# Patient Record
Sex: MALE | Race: WHITE | NOT HISPANIC OR LATINO | Employment: OTHER | RURAL
[De-identification: names, ages, dates, MRNs, and addresses within clinical notes are randomized per-mention and may not be internally consistent; named-entity substitution may affect disease eponyms.]

---

## 2017-03-13 RX ORDER — NAPROXEN 500 MG/1
500 TABLET ORAL 2 TIMES DAILY WITH MEALS
Qty: 180 TABLET | Refills: 1 | Status: SHIPPED | OUTPATIENT
Start: 2017-03-13 | End: 2017-10-03 | Stop reason: SDUPTHER

## 2017-03-13 NOTE — TELEPHONE ENCOUNTER
LAST OV 11/14/16 RENEWED 6/6/16 #180 1 REFILL    PATIENT HAD EXTRA MEDICATION FROM PREVIOUS PRESCRIPTIONS HE IS NOW NEEDING THE REFILLS

## 2017-03-21 DIAGNOSIS — E78.00 HYPERCHOLESTEREMIA: ICD-10-CM

## 2017-03-21 DIAGNOSIS — I10 ESSENTIAL HYPERTENSION: ICD-10-CM

## 2017-03-21 RX ORDER — FELODIPINE 5 MG/1
TABLET, EXTENDED RELEASE ORAL
Qty: 90 TABLET | Refills: 3 | Status: SHIPPED | OUTPATIENT
Start: 2017-03-21 | End: 2018-04-02 | Stop reason: SDUPTHER

## 2017-03-21 RX ORDER — LOVASTATIN 40 MG/1
TABLET ORAL
Qty: 90 TABLET | Refills: 3 | Status: SHIPPED | OUTPATIENT
Start: 2017-03-21 | End: 2018-04-02 | Stop reason: SDUPTHER

## 2017-03-21 NOTE — TELEPHONE ENCOUNTER
Last OV  11/14/16   DW    Lovastatin  12/23/16  #90  X 0 refills    Felodipine  12/23/16  #90 x 0 refills.

## 2017-03-27 RX ORDER — SILDENAFIL CITRATE 100 MG
TABLET ORAL
Qty: 18 TABLET | Refills: 2 | OUTPATIENT
Start: 2017-03-27

## 2017-05-08 ENCOUNTER — OFFICE VISIT (OUTPATIENT)
Dept: FAMILY MEDICINE CLINIC | Facility: CLINIC | Age: 82
End: 2017-05-08

## 2017-05-08 VITALS
DIASTOLIC BLOOD PRESSURE: 68 MMHG | SYSTOLIC BLOOD PRESSURE: 138 MMHG | HEART RATE: 56 BPM | BODY MASS INDEX: 28.14 KG/M2 | WEIGHT: 190 LBS | TEMPERATURE: 97.4 F | OXYGEN SATURATION: 97 % | HEIGHT: 69 IN

## 2017-05-08 DIAGNOSIS — R73.01 IMPAIRED FASTING GLUCOSE: ICD-10-CM

## 2017-05-08 DIAGNOSIS — M15.9 PRIMARY OSTEOARTHRITIS INVOLVING MULTIPLE JOINTS: ICD-10-CM

## 2017-05-08 DIAGNOSIS — Z00.00 MEDICARE ANNUAL WELLNESS VISIT, INITIAL: ICD-10-CM

## 2017-05-08 DIAGNOSIS — I10 ESSENTIAL HYPERTENSION: Primary | ICD-10-CM

## 2017-05-08 DIAGNOSIS — E78.00 HYPERCHOLESTEROLEMIA: ICD-10-CM

## 2017-05-08 LAB
ALBUMIN SERPL-MCNC: 4.6 G/DL (ref 3.4–4.8)
ALBUMIN/GLOB SERPL: 1.1 G/DL (ref 1.5–2.5)
ALP SERPL-CCNC: 75 U/L (ref 40–129)
ALT SERPL W P-5'-P-CCNC: 15 U/L (ref 10–44)
ANION GAP SERPL CALCULATED.3IONS-SCNC: 6.6 MMOL/L (ref 3.6–11.2)
AST SERPL-CCNC: 27 U/L (ref 10–34)
BASOPHILS # BLD AUTO: 0.04 10*3/MM3 (ref 0–0.3)
BASOPHILS NFR BLD AUTO: 0.4 % (ref 0–2)
BILIRUB SERPL-MCNC: 0.7 MG/DL (ref 0.2–1.8)
BUN BLD-MCNC: 14 MG/DL (ref 7–21)
BUN/CREAT SERPL: 10.7 (ref 7–25)
CALCIUM SPEC-SCNC: 9.9 MG/DL (ref 7.7–10)
CHLORIDE SERPL-SCNC: 102 MMOL/L (ref 99–112)
CHOLEST SERPL-MCNC: 180 MG/DL (ref 0–200)
CO2 SERPL-SCNC: 31.4 MMOL/L (ref 24.3–31.9)
CREAT BLD-MCNC: 1.31 MG/DL (ref 0.43–1.29)
DEPRECATED RDW RBC AUTO: 40.3 FL (ref 37–54)
EOSINOPHIL # BLD AUTO: 0.24 10*3/MM3 (ref 0–0.7)
EOSINOPHIL NFR BLD AUTO: 2.7 % (ref 0–7)
ERYTHROCYTE [DISTWIDTH] IN BLOOD BY AUTOMATED COUNT: 12.4 % (ref 11.5–14.5)
GFR SERPL CREATININE-BSD FRML MDRD: 52 ML/MIN/1.73
GLOBULIN UR ELPH-MCNC: 4.2 GM/DL
GLUCOSE BLD-MCNC: 112 MG/DL (ref 70–110)
HBA1C MFR BLD: 5.7 % (ref 4.5–5.7)
HCT VFR BLD AUTO: 45.2 % (ref 42–52)
HDLC SERPL-MCNC: 52 MG/DL (ref 60–100)
HGB BLD-MCNC: 16 G/DL (ref 14–18)
IMM GRANULOCYTES # BLD: 0.02 10*3/MM3 (ref 0–0.03)
IMM GRANULOCYTES NFR BLD: 0.2 % (ref 0–0.5)
LDLC SERPL CALC-MCNC: 105 MG/DL (ref 0–100)
LDLC/HDLC SERPL: 2.02 {RATIO}
LYMPHOCYTES # BLD AUTO: 2.37 10*3/MM3 (ref 1–3)
LYMPHOCYTES NFR BLD AUTO: 26.5 % (ref 16–46)
MCH RBC QN AUTO: 31.9 PG (ref 27–33)
MCHC RBC AUTO-ENTMCNC: 35.4 G/DL (ref 33–37)
MCV RBC AUTO: 90 FL (ref 80–94)
MONOCYTES # BLD AUTO: 0.9 10*3/MM3 (ref 0.1–0.9)
MONOCYTES NFR BLD AUTO: 10.1 % (ref 0–12)
NEUTROPHILS # BLD AUTO: 5.37 10*3/MM3 (ref 1.4–6.5)
NEUTROPHILS NFR BLD AUTO: 60.1 % (ref 40–75)
NRBC BLD MANUAL-RTO: 0 /100 WBC (ref 0–0)
OSMOLALITY SERPL CALC.SUM OF ELEC: 280.6 MOSM/KG (ref 273–305)
PLATELET # BLD AUTO: 288 10*3/MM3 (ref 130–400)
PMV BLD AUTO: 11.9 FL (ref 6–10)
POTASSIUM BLD-SCNC: 4.2 MMOL/L (ref 3.5–5.3)
PROT SERPL-MCNC: 8.8 G/DL (ref 6–8)
RBC # BLD AUTO: 5.02 10*6/MM3 (ref 4.7–6.1)
SODIUM BLD-SCNC: 140 MMOL/L (ref 135–153)
TRIGL SERPL-MCNC: 116 MG/DL (ref 0–150)
VLDLC SERPL-MCNC: 23.2 MG/DL
WBC NRBC COR # BLD: 8.94 10*3/MM3 (ref 4.5–12.5)

## 2017-05-08 PROCEDURE — 80061 LIPID PANEL: CPT | Performed by: FAMILY MEDICINE

## 2017-05-08 PROCEDURE — 36415 COLL VENOUS BLD VENIPUNCTURE: CPT | Performed by: FAMILY MEDICINE

## 2017-05-08 PROCEDURE — 99213 OFFICE O/P EST LOW 20 MIN: CPT | Performed by: FAMILY MEDICINE

## 2017-05-08 PROCEDURE — 90732 PPSV23 VACC 2 YRS+ SUBQ/IM: CPT | Performed by: FAMILY MEDICINE

## 2017-05-08 PROCEDURE — 83036 HEMOGLOBIN GLYCOSYLATED A1C: CPT | Performed by: FAMILY MEDICINE

## 2017-05-08 PROCEDURE — 85025 COMPLETE CBC W/AUTO DIFF WBC: CPT | Performed by: FAMILY MEDICINE

## 2017-05-08 PROCEDURE — 80053 COMPREHEN METABOLIC PANEL: CPT | Performed by: FAMILY MEDICINE

## 2017-05-08 PROCEDURE — G0009 ADMIN PNEUMOCOCCAL VACCINE: HCPCS | Performed by: FAMILY MEDICINE

## 2017-05-08 PROCEDURE — G0439 PPPS, SUBSEQ VISIT: HCPCS | Performed by: FAMILY MEDICINE

## 2017-05-16 DIAGNOSIS — I10 BENIGN ESSENTIAL HYPERTENSION: ICD-10-CM

## 2017-05-16 RX ORDER — TRIAMTERENE AND HYDROCHLOROTHIAZIDE 37.5; 25 MG/1; MG/1
CAPSULE ORAL
Qty: 90 CAPSULE | Refills: 3 | Status: SHIPPED | OUTPATIENT
Start: 2017-05-16 | End: 2018-05-21 | Stop reason: SDUPTHER

## 2017-09-11 RX ORDER — NAPROXEN 500 MG/1
TABLET ORAL
Qty: 180 TABLET | Refills: 1 | OUTPATIENT
Start: 2017-09-11

## 2017-10-03 RX ORDER — NAPROXEN 500 MG/1
500 TABLET ORAL 2 TIMES DAILY WITH MEALS
Qty: 180 TABLET | Refills: 3 | Status: SHIPPED | OUTPATIENT
Start: 2017-10-03 | End: 2018-05-21

## 2017-10-11 ENCOUNTER — FLU SHOT (OUTPATIENT)
Dept: FAMILY MEDICINE CLINIC | Facility: CLINIC | Age: 82
End: 2017-10-11

## 2017-10-11 PROCEDURE — 90662 IIV NO PRSV INCREASED AG IM: CPT | Performed by: FAMILY MEDICINE

## 2017-10-11 PROCEDURE — G0008 ADMIN INFLUENZA VIRUS VAC: HCPCS | Performed by: FAMILY MEDICINE

## 2017-11-08 ENCOUNTER — OFFICE VISIT (OUTPATIENT)
Dept: FAMILY MEDICINE CLINIC | Facility: CLINIC | Age: 82
End: 2017-11-08

## 2017-11-08 VITALS
WEIGHT: 184.3 LBS | HEART RATE: 62 BPM | SYSTOLIC BLOOD PRESSURE: 134 MMHG | TEMPERATURE: 97.7 F | HEIGHT: 69 IN | BODY MASS INDEX: 27.3 KG/M2 | DIASTOLIC BLOOD PRESSURE: 60 MMHG

## 2017-11-08 DIAGNOSIS — E78.00 HYPERCHOLESTEROLEMIA: Primary | ICD-10-CM

## 2017-11-08 DIAGNOSIS — I10 ESSENTIAL HYPERTENSION: ICD-10-CM

## 2017-11-08 DIAGNOSIS — M65.9 TENOSYNOVITIS OF THUMB: ICD-10-CM

## 2017-11-08 DIAGNOSIS — M15.9 PRIMARY OSTEOARTHRITIS INVOLVING MULTIPLE JOINTS: ICD-10-CM

## 2017-11-08 LAB
ALBUMIN SERPL-MCNC: 4.9 G/DL (ref 3.4–4.8)
ALBUMIN/GLOB SERPL: 1.2 G/DL (ref 1.5–2.5)
ALP SERPL-CCNC: 80 U/L (ref 40–129)
ALT SERPL W P-5'-P-CCNC: 20 U/L (ref 10–44)
ANION GAP SERPL CALCULATED.3IONS-SCNC: 10.7 MMOL/L (ref 3.6–11.2)
AST SERPL-CCNC: 24 U/L (ref 10–34)
BASOPHILS # BLD AUTO: 0.03 10*3/MM3 (ref 0–0.3)
BASOPHILS NFR BLD AUTO: 0.3 % (ref 0–2)
BILIRUB SERPL-MCNC: 0.7 MG/DL (ref 0.2–1.8)
BUN BLD-MCNC: 17 MG/DL (ref 7–21)
BUN/CREAT SERPL: 12.8 (ref 7–25)
CALCIUM SPEC-SCNC: 10.2 MG/DL (ref 7.7–10)
CHLORIDE SERPL-SCNC: 101 MMOL/L (ref 99–112)
CHOLEST SERPL-MCNC: 189 MG/DL (ref 0–200)
CO2 SERPL-SCNC: 29.3 MMOL/L (ref 24.3–31.9)
CREAT BLD-MCNC: 1.33 MG/DL (ref 0.43–1.29)
DEPRECATED RDW RBC AUTO: 39.8 FL (ref 37–54)
EOSINOPHIL # BLD AUTO: 0.18 10*3/MM3 (ref 0–0.7)
EOSINOPHIL NFR BLD AUTO: 1.8 % (ref 0–7)
ERYTHROCYTE [DISTWIDTH] IN BLOOD BY AUTOMATED COUNT: 12.4 % (ref 11.5–14.5)
GFR SERPL CREATININE-BSD FRML MDRD: 51 ML/MIN/1.73
GLOBULIN UR ELPH-MCNC: 4.1 GM/DL
GLUCOSE BLD-MCNC: 118 MG/DL (ref 70–110)
HCT VFR BLD AUTO: 45.5 % (ref 42–52)
HDLC SERPL-MCNC: 50 MG/DL (ref 60–100)
HGB BLD-MCNC: 16 G/DL (ref 14–18)
IMM GRANULOCYTES # BLD: 0.03 10*3/MM3 (ref 0–0.03)
IMM GRANULOCYTES NFR BLD: 0.3 % (ref 0–0.5)
LDLC SERPL CALC-MCNC: 108 MG/DL (ref 0–100)
LDLC/HDLC SERPL: 2.15 {RATIO}
LYMPHOCYTES # BLD AUTO: 2.09 10*3/MM3 (ref 1–3)
LYMPHOCYTES NFR BLD AUTO: 20.7 % (ref 16–46)
MCH RBC QN AUTO: 31.4 PG (ref 27–33)
MCHC RBC AUTO-ENTMCNC: 35.2 G/DL (ref 33–37)
MCV RBC AUTO: 89.4 FL (ref 80–94)
MONOCYTES # BLD AUTO: 0.62 10*3/MM3 (ref 0.1–0.9)
MONOCYTES NFR BLD AUTO: 6.2 % (ref 0–12)
NEUTROPHILS # BLD AUTO: 7.13 10*3/MM3 (ref 1.4–6.5)
NEUTROPHILS NFR BLD AUTO: 70.7 % (ref 40–75)
OSMOLALITY SERPL CALC.SUM OF ELEC: 283.9 MOSM/KG (ref 273–305)
PLATELET # BLD AUTO: 253 10*3/MM3 (ref 130–400)
PMV BLD AUTO: 11.3 FL (ref 6–10)
POTASSIUM BLD-SCNC: 3.8 MMOL/L (ref 3.5–5.3)
PROT SERPL-MCNC: 9 G/DL (ref 6–8)
RBC # BLD AUTO: 5.09 10*6/MM3 (ref 4.7–6.1)
SODIUM BLD-SCNC: 141 MMOL/L (ref 135–153)
TRIGL SERPL-MCNC: 157 MG/DL (ref 0–150)
TSH SERPL DL<=0.05 MIU/L-ACNC: 3.77 MIU/ML (ref 0.55–4.78)
VLDLC SERPL-MCNC: 31.4 MG/DL
WBC NRBC COR # BLD: 10.08 10*3/MM3 (ref 4.5–12.5)

## 2017-11-08 PROCEDURE — 84443 ASSAY THYROID STIM HORMONE: CPT | Performed by: FAMILY MEDICINE

## 2017-11-08 PROCEDURE — 36415 COLL VENOUS BLD VENIPUNCTURE: CPT | Performed by: FAMILY MEDICINE

## 2017-11-08 PROCEDURE — 80061 LIPID PANEL: CPT | Performed by: FAMILY MEDICINE

## 2017-11-08 PROCEDURE — 85025 COMPLETE CBC W/AUTO DIFF WBC: CPT | Performed by: FAMILY MEDICINE

## 2017-11-08 PROCEDURE — 80053 COMPREHEN METABOLIC PANEL: CPT | Performed by: FAMILY MEDICINE

## 2017-11-08 PROCEDURE — 99213 OFFICE O/P EST LOW 20 MIN: CPT | Performed by: FAMILY MEDICINE

## 2017-11-08 NOTE — PROGRESS NOTES
"Subjective   Clinton Ramirez is a 82 y.o. male.     History of Present Illness follow-up on hypertension dyslipidemia chronic medical problems.  Said couple months difficulty mechanical nontraumatic right wrist discomfort at base of thumb.  Stays very physically active.  Denies recent illness dizziness headaches.  Denies SOB palpitations CP GI  changes.  Sleep patterns are stable.  Energy is stable.  Her and on desired PME.    The following portions of the patient's history were reviewed and updated as appropriate: allergies, current medications, past family history, past social history, past surgical history and problem list.    Review of Systems see the history of present illness    Objective   Physical Exam   Constitutional: He is oriented to person, place, and time. He appears well-developed and well-nourished.   HENT:   Head: Normocephalic.   Mouth/Throat: Oropharynx is clear and moist.   Eyes: Conjunctivae and EOM are normal. Pupils are equal, round, and reactive to light.   Neck: Normal range of motion. Neck supple. No thyromegaly present.   Cardiovascular: Normal rate, regular rhythm, normal heart sounds and intact distal pulses.    No murmur heard.  Pulmonary/Chest: Effort normal and breath sounds normal.   Abdominal: Soft. Bowel sounds are normal. There is no tenderness.   Musculoskeletal: Normal range of motion. He exhibits no edema.   Mildly tender base of right thumb.  Good .  Good range of motion wrist.  No crepitus felt.   Neurological: He is alert and oriented to person, place, and time.   Skin: Skin is warm and dry.   Psychiatric: He has a normal mood and affect.   Vitals reviewed.    /60 (BP Location: Right arm, Patient Position: Sitting, Cuff Size: Adult)  Pulse 62  Temp 97.7 °F (36.5 °C) (Oral)   Ht 68.5\" (174 cm)  Wt 184 lb 4.8 oz (83.6 kg)  BMI 27.62 kg/m2  Assessment/Plan   Clinton was seen today for follow-up.    Diagnoses and all orders for this visit:    Hypercholesterolemia  - "     CBC & Differential  -     Comprehensive Metabolic Panel  -     Lipid Panel  -     TSH  -     CBC Auto Differential    Essential hypertension  -     CBC & Differential  -     Comprehensive Metabolic Panel  -     Lipid Panel  -     TSH  -     CBC Auto Differential    Primary osteoarthritis involving multiple joints  -     CBC & Differential  -     Comprehensive Metabolic Panel  -     CBC Auto Differential    Tenosynovitis of thumb     Weight down a little.  Blood pressure looks good.  Today will check labs to monitor metabolic status.  Overall you seem to be doing remarkably well.  I encourage you to pursue regular exercise as well as good dietary choices.  Regarding the thumb I think is soft tissue inflammation.  I don't see much benefit in changing current anti-inflammatory and you agree.  At this time encourage you to limit excessive repetitive activities.  Continue all medications as reconciled ordered.  Will notify you of lab results.  Follow-up in a few months routinely.  See us definitely if the thumb does not improve with some decreased activities in an attempt to rest that joint.

## 2018-02-27 ENCOUNTER — OFFICE VISIT (OUTPATIENT)
Dept: FAMILY MEDICINE CLINIC | Facility: CLINIC | Age: 83
End: 2018-02-27

## 2018-02-27 VITALS
HEART RATE: 57 BPM | SYSTOLIC BLOOD PRESSURE: 124 MMHG | DIASTOLIC BLOOD PRESSURE: 64 MMHG | BODY MASS INDEX: 27.3 KG/M2 | HEIGHT: 69 IN | TEMPERATURE: 97.8 F | OXYGEN SATURATION: 98 % | WEIGHT: 184.3 LBS

## 2018-02-27 DIAGNOSIS — D22.30 NEVUS OF FACE: Primary | ICD-10-CM

## 2018-02-27 PROCEDURE — 99213 OFFICE O/P EST LOW 20 MIN: CPT | Performed by: NURSE PRACTITIONER

## 2018-03-17 DIAGNOSIS — I10 ESSENTIAL HYPERTENSION: ICD-10-CM

## 2018-03-17 DIAGNOSIS — E78.00 HYPERCHOLESTEREMIA: ICD-10-CM

## 2018-03-19 RX ORDER — LOVASTATIN 40 MG/1
TABLET ORAL
Qty: 90 TABLET | Refills: 3 | OUTPATIENT
Start: 2018-03-19

## 2018-03-19 RX ORDER — FELODIPINE 5 MG/1
TABLET, EXTENDED RELEASE ORAL
Qty: 90 TABLET | Refills: 3 | OUTPATIENT
Start: 2018-03-19

## 2018-04-02 DIAGNOSIS — I10 ESSENTIAL HYPERTENSION: ICD-10-CM

## 2018-04-02 DIAGNOSIS — E78.00 HYPERCHOLESTEREMIA: ICD-10-CM

## 2018-04-02 RX ORDER — LOVASTATIN 40 MG/1
40 TABLET ORAL
Qty: 90 TABLET | Refills: 3 | Status: SHIPPED | OUTPATIENT
Start: 2018-04-02 | End: 2018-07-11 | Stop reason: HOSPADM

## 2018-04-02 RX ORDER — FELODIPINE 5 MG/1
5 TABLET, EXTENDED RELEASE ORAL DAILY
Qty: 90 TABLET | Refills: 3 | Status: SHIPPED | OUTPATIENT
Start: 2018-04-02 | End: 2019-03-12 | Stop reason: SDUPTHER

## 2018-05-08 ENCOUNTER — OFFICE VISIT (OUTPATIENT)
Dept: FAMILY MEDICINE CLINIC | Facility: CLINIC | Age: 83
End: 2018-05-08

## 2018-05-08 VITALS
SYSTOLIC BLOOD PRESSURE: 114 MMHG | WEIGHT: 184.2 LBS | HEART RATE: 83 BPM | BODY MASS INDEX: 27.28 KG/M2 | HEIGHT: 69 IN | TEMPERATURE: 97.8 F | DIASTOLIC BLOOD PRESSURE: 66 MMHG

## 2018-05-08 DIAGNOSIS — E78.00 HYPERCHOLESTEROLEMIA: ICD-10-CM

## 2018-05-08 DIAGNOSIS — I10 ESSENTIAL HYPERTENSION: Primary | ICD-10-CM

## 2018-05-08 DIAGNOSIS — R01.1 HEART MURMUR, SYSTOLIC: ICD-10-CM

## 2018-05-08 LAB
ALBUMIN SERPL-MCNC: 4.3 G/DL (ref 3.4–4.8)
ALBUMIN/GLOB SERPL: 1.2 G/DL (ref 1.5–2.5)
ALP SERPL-CCNC: 69 U/L (ref 40–129)
ALT SERPL W P-5'-P-CCNC: 17 U/L (ref 10–44)
ANION GAP SERPL CALCULATED.3IONS-SCNC: 7.6 MMOL/L (ref 3.6–11.2)
AST SERPL-CCNC: 22 U/L (ref 10–34)
BASOPHILS # BLD AUTO: 0.02 10*3/MM3 (ref 0–0.3)
BASOPHILS NFR BLD AUTO: 0.2 % (ref 0–2)
BILIRUB SERPL-MCNC: 0.5 MG/DL (ref 0.2–1.8)
BUN BLD-MCNC: 18 MG/DL (ref 7–21)
BUN/CREAT SERPL: 12.4 (ref 7–25)
CALCIUM SPEC-SCNC: 9.4 MG/DL (ref 7.7–10)
CHLORIDE SERPL-SCNC: 103 MMOL/L (ref 99–112)
CHOLEST SERPL-MCNC: 159 MG/DL (ref 0–200)
CO2 SERPL-SCNC: 29.4 MMOL/L (ref 24.3–31.9)
CREAT BLD-MCNC: 1.45 MG/DL (ref 0.43–1.29)
DEPRECATED RDW RBC AUTO: 37.9 FL (ref 37–54)
EOSINOPHIL # BLD AUTO: 0.13 10*3/MM3 (ref 0–0.7)
EOSINOPHIL NFR BLD AUTO: 1.5 % (ref 0–7)
ERYTHROCYTE [DISTWIDTH] IN BLOOD BY AUTOMATED COUNT: 12 % (ref 11.5–14.5)
GFR SERPL CREATININE-BSD FRML MDRD: 46 ML/MIN/1.73
GLOBULIN UR ELPH-MCNC: 3.6 GM/DL
GLUCOSE BLD-MCNC: 155 MG/DL (ref 70–110)
HCT VFR BLD AUTO: 40.4 % (ref 42–52)
HDLC SERPL-MCNC: 41 MG/DL (ref 60–100)
HGB BLD-MCNC: 14.4 G/DL (ref 14–18)
IMM GRANULOCYTES # BLD: 0.02 10*3/MM3 (ref 0–0.03)
IMM GRANULOCYTES NFR BLD: 0.2 % (ref 0–0.5)
LDLC SERPL CALC-MCNC: 89 MG/DL (ref 0–100)
LDLC/HDLC SERPL: 2.18 {RATIO}
LYMPHOCYTES # BLD AUTO: 1.76 10*3/MM3 (ref 1–3)
LYMPHOCYTES NFR BLD AUTO: 20.2 % (ref 16–46)
MCH RBC QN AUTO: 31.8 PG (ref 27–33)
MCHC RBC AUTO-ENTMCNC: 35.6 G/DL (ref 33–37)
MCV RBC AUTO: 89.2 FL (ref 80–94)
MONOCYTES # BLD AUTO: 0.61 10*3/MM3 (ref 0.1–0.9)
MONOCYTES NFR BLD AUTO: 7 % (ref 0–12)
NEUTROPHILS # BLD AUTO: 6.18 10*3/MM3 (ref 1.4–6.5)
NEUTROPHILS NFR BLD AUTO: 70.9 % (ref 40–75)
OSMOLALITY SERPL CALC.SUM OF ELEC: 284.4 MOSM/KG (ref 273–305)
PLATELET # BLD AUTO: 255 10*3/MM3 (ref 130–400)
PMV BLD AUTO: 11.5 FL (ref 6–10)
POTASSIUM BLD-SCNC: 3.7 MMOL/L (ref 3.5–5.3)
PROT SERPL-MCNC: 7.9 G/DL (ref 6–8)
RBC # BLD AUTO: 4.53 10*6/MM3 (ref 4.7–6.1)
SODIUM BLD-SCNC: 140 MMOL/L (ref 135–153)
TRIGL SERPL-MCNC: 144 MG/DL (ref 0–150)
VLDLC SERPL-MCNC: 28.8 MG/DL
WBC NRBC COR # BLD: 8.72 10*3/MM3 (ref 4.5–12.5)

## 2018-05-08 PROCEDURE — 80053 COMPREHEN METABOLIC PANEL: CPT | Performed by: FAMILY MEDICINE

## 2018-05-08 PROCEDURE — 36415 COLL VENOUS BLD VENIPUNCTURE: CPT | Performed by: FAMILY MEDICINE

## 2018-05-08 PROCEDURE — 80061 LIPID PANEL: CPT | Performed by: FAMILY MEDICINE

## 2018-05-08 PROCEDURE — 85025 COMPLETE CBC W/AUTO DIFF WBC: CPT | Performed by: FAMILY MEDICINE

## 2018-05-08 PROCEDURE — 99214 OFFICE O/P EST MOD 30 MIN: CPT | Performed by: FAMILY MEDICINE

## 2018-05-08 NOTE — PROGRESS NOTES
"Subjective     Chief Complaint   Patient presents with   • Hyperlipidemia       Clinton Ramirez is a 83 y.o. male.     History of Present Illness follow-up regarding hypertension dyslipidemia arthritis.  Has had recent dermatological procedures.  Will be following their.  Medications are as reconciled compliant.  Has not been ill.  Generally feels quite well.  Good energy.  Trying to be active and compliant with diet.  Denies CP SOB palpitations GI  new orthopedic concerns.  Current on PME.    The following portions of the patient's history were reviewed and updated as appropriate: allergies, current medications, past medical history, past social history, past surgical history and problem list.    Review of Systems see the history of present illness    Objective   Physical Exam   Constitutional: He is oriented to person, place, and time. He appears well-developed and well-nourished.   HENT:   Head: Normocephalic.   Right Ear: External ear normal.   Left Ear: External ear normal.   Mouth/Throat: Oropharynx is clear and moist.   Eyes: Conjunctivae and EOM are normal. Pupils are equal, round, and reactive to light.   Neck: Normal range of motion. Neck supple. No tracheal deviation present. No thyromegaly present.   Cardiovascular: Normal rate, regular rhythm and intact distal pulses.    Murmur heard.  Systolic murmur at base   Pulmonary/Chest: Effort normal and breath sounds normal.   Abdominal: Soft. Bowel sounds are normal. There is no tenderness.   Musculoskeletal: Normal range of motion. He exhibits no edema.   Lymphadenopathy:     He has no cervical adenopathy.   Neurological: He is alert and oriented to person, place, and time.   Skin: Skin is warm and dry.   Psychiatric: He has a normal mood and affect.   Vitals reviewed.    /66 (BP Location: Left arm, Patient Position: Sitting, Cuff Size: Adult)   Pulse 83   Temp 97.8 °F (36.6 °C) (Oral)   Ht 174 cm (68.5\")   Wt 83.6 kg (184 lb 3.2 oz)   BMI 27.60 " kg/m²   Assessment/Plan   Clinton was seen today for hyperlipidemia.    Diagnoses and all orders for this visit:    Essential hypertension  -     Comprehensive Metabolic Panel  -     CBC & Differential    Hypercholesterolemia  -     Comprehensive Metabolic Panel  -     Lipid Panel  -     CBC & Differential    Heart murmur, systolic  -     Adult Transthoracic Echo Complete W/ Cont if Necessary Per Protocol; Future    Continue aggressive TLC.  Check labs.  Will ask for outpatient echocardiogram.  You report being told as young man of murmur but has not been noted in multiple years.  Follow-up in 6 months and pending.

## 2018-05-09 NOTE — PROGRESS NOTES
Blood testing fairly stable although kidney function not as good.  Try to stop taking the arthritis pill if at all possible.  Would be best just to use plain Tylenol.  Avoid Advil Motrin type products OTC also.

## 2018-05-11 ENCOUNTER — APPOINTMENT (OUTPATIENT)
Dept: CARDIOLOGY | Facility: HOSPITAL | Age: 83
End: 2018-05-11

## 2018-05-11 ENCOUNTER — HOSPITAL ENCOUNTER (OUTPATIENT)
Dept: CARDIOLOGY | Facility: HOSPITAL | Age: 83
Discharge: HOME | End: 2018-05-11
Admitting: FAMILY MEDICINE

## 2018-05-11 DIAGNOSIS — R01.1 HEART MURMUR, SYSTOLIC: ICD-10-CM

## 2018-05-11 DIAGNOSIS — I10 BENIGN ESSENTIAL HYPERTENSION: ICD-10-CM

## 2018-05-11 PROCEDURE — 93306 TTE W/DOPPLER COMPLETE: CPT

## 2018-05-11 PROCEDURE — 93306 TTE W/DOPPLER COMPLETE: CPT | Performed by: INTERNAL MEDICINE

## 2018-05-11 RX ORDER — TRIAMTERENE AND HYDROCHLOROTHIAZIDE 37.5; 25 MG/1; MG/1
CAPSULE ORAL
Qty: 90 CAPSULE | Refills: 3 | OUTPATIENT
Start: 2018-05-11

## 2018-05-14 ENCOUNTER — EPISODE CHANGES (OUTPATIENT)
Dept: CASE MANAGEMENT | Facility: OTHER | Age: 83
End: 2018-05-14

## 2018-05-15 LAB
BH CV ECHO MEAS - % IVS THICK: 38 %
BH CV ECHO MEAS - % LVPW THICK: 70.6 %
BH CV ECHO MEAS - ACS: 1.1 CM
BH CV ECHO MEAS - AO MAX PG (FULL): 14.3 MMHG
BH CV ECHO MEAS - AO MAX PG: 18.1 MMHG
BH CV ECHO MEAS - AO MEAN PG (FULL): 5.9 MMHG
BH CV ECHO MEAS - AO MEAN PG: 9.1 MMHG
BH CV ECHO MEAS - AO ROOT AREA (BSA CORRECTED): 1.6
BH CV ECHO MEAS - AO ROOT AREA: 7.6 CM^2
BH CV ECHO MEAS - AO ROOT DIAM: 3.1 CM
BH CV ECHO MEAS - AO V2 MAX: 212.5 CM/SEC
BH CV ECHO MEAS - AO V2 MEAN: 136.6 CM/SEC
BH CV ECHO MEAS - AO V2 VTI: 52.2 CM
BH CV ECHO MEAS - AVA(I,A): 1.8 CM^2
BH CV ECHO MEAS - AVA(I,D): 1.8 CM^2
BH CV ECHO MEAS - AVA(V,A): 1.4 CM^2
BH CV ECHO MEAS - AVA(V,D): 1.4 CM^2
BH CV ECHO MEAS - BSA(HAYCOCK): 2 M^2
BH CV ECHO MEAS - BSA: 2 M^2
BH CV ECHO MEAS - BZI_BMI: 28 KILOGRAMS/M^2
BH CV ECHO MEAS - BZI_METRIC_HEIGHT: 172.7 CM
BH CV ECHO MEAS - BZI_METRIC_WEIGHT: 83.5 KG
BH CV ECHO MEAS - CONTRAST EF 4CH: 48.8 ML/M^2
BH CV ECHO MEAS - EDV(CUBED): 135.7 ML
BH CV ECHO MEAS - EDV(MOD-SP4): 41 ML
BH CV ECHO MEAS - EDV(TEICH): 126 ML
BH CV ECHO MEAS - EF(CUBED): 59.2 %
BH CV ECHO MEAS - EF(MOD-SP4): 48.8 %
BH CV ECHO MEAS - EF(TEICH): 50.5 %
BH CV ECHO MEAS - ESV(CUBED): 55.3 ML
BH CV ECHO MEAS - ESV(MOD-SP4): 21 ML
BH CV ECHO MEAS - ESV(TEICH): 62.4 ML
BH CV ECHO MEAS - FS: 25.8 %
BH CV ECHO MEAS - IVS/LVPW: 0.91
BH CV ECHO MEAS - IVSD: 0.97 CM
BH CV ECHO MEAS - IVSS: 1.3 CM
BH CV ECHO MEAS - LA DIMENSION: 3.2 CM
BH CV ECHO MEAS - LA/AO: 1
BH CV ECHO MEAS - LV DIASTOLIC VOL/BSA (35-75): 20.8 ML/M^2
BH CV ECHO MEAS - LV MASS(C)D: 196 GRAMS
BH CV ECHO MEAS - LV MASS(C)DI: 99.3 GRAMS/M^2
BH CV ECHO MEAS - LV MASS(C)S: 237.5 GRAMS
BH CV ECHO MEAS - LV MASS(C)SI: 120.4 GRAMS/M^2
BH CV ECHO MEAS - LV MAX PG: 3.8 MMHG
BH CV ECHO MEAS - LV MEAN PG: 3.2 MMHG
BH CV ECHO MEAS - LV SYSTOLIC VOL/BSA (12-30): 10.6 ML/M^2
BH CV ECHO MEAS - LV V1 MAX: 97.5 CM/SEC
BH CV ECHO MEAS - LV V1 MEAN: 84.6 CM/SEC
BH CV ECHO MEAS - LV V1 VTI: 32 CM
BH CV ECHO MEAS - LVIDD: 5.1 CM
BH CV ECHO MEAS - LVIDS: 3.8 CM
BH CV ECHO MEAS - LVLD AP4: 6.1 CM
BH CV ECHO MEAS - LVLS AP4: 5.7 CM
BH CV ECHO MEAS - LVOT AREA (M): 2.8 CM^2
BH CV ECHO MEAS - LVOT AREA: 3 CM^2
BH CV ECHO MEAS - LVOT DIAM: 1.9 CM
BH CV ECHO MEAS - LVPWD: 1.1 CM
BH CV ECHO MEAS - LVPWS: 1.8 CM
BH CV ECHO MEAS - MV A MAX VEL: 156.3 CM/SEC
BH CV ECHO MEAS - MV E MAX VEL: 117.3 CM/SEC
BH CV ECHO MEAS - MV E/A: 0.75
BH CV ECHO MEAS - PA ACC SLOPE: 1223 CM/SEC^2
BH CV ECHO MEAS - PA ACC TIME: 0.1 SEC
BH CV ECHO MEAS - PA PR(ACCEL): 33.1 MMHG
BH CV ECHO MEAS - RAP SYSTOLE: 10 MMHG
BH CV ECHO MEAS - RVSP: 38 MMHG
BH CV ECHO MEAS - SI(AO): 201.1 ML/M^2
BH CV ECHO MEAS - SI(CUBED): 40.7 ML/M^2
BH CV ECHO MEAS - SI(LVOT): 48.2 ML/M^2
BH CV ECHO MEAS - SI(MOD-SP4): 10.1 ML/M^2
BH CV ECHO MEAS - SI(TEICH): 32.2 ML/M^2
BH CV ECHO MEAS - SV(AO): 396.8 ML
BH CV ECHO MEAS - SV(CUBED): 80.3 ML
BH CV ECHO MEAS - SV(LVOT): 95.1 ML
BH CV ECHO MEAS - SV(MOD-SP4): 20 ML
BH CV ECHO MEAS - SV(TEICH): 63.6 ML
BH CV ECHO MEAS - TR MAX VEL: 264.7 CM/SEC
MAXIMAL PREDICTED HEART RATE: 137 BPM
STRESS TARGET HR: 116 BPM

## 2018-05-15 NOTE — PROGRESS NOTES
Let the patient know that the echocardiogram did document valvular changes.  2 valves were reportedly affected.  This is what is causing the murmur or turbulent flow.  They are described as moderate in severity and I would like to have cardiology consultation.  Please arrange.

## 2018-05-16 DIAGNOSIS — I38 VALVULAR HEART DISEASE: Primary | ICD-10-CM

## 2018-05-21 ENCOUNTER — OFFICE VISIT (OUTPATIENT)
Dept: FAMILY MEDICINE CLINIC | Facility: CLINIC | Age: 83
End: 2018-05-21

## 2018-05-21 VITALS
SYSTOLIC BLOOD PRESSURE: 132 MMHG | TEMPERATURE: 97.4 F | WEIGHT: 180.3 LBS | HEART RATE: 75 BPM | OXYGEN SATURATION: 97 % | DIASTOLIC BLOOD PRESSURE: 69 MMHG | BODY MASS INDEX: 26.7 KG/M2 | HEIGHT: 69 IN

## 2018-05-21 DIAGNOSIS — M10.00 IDIOPATHIC GOUT, UNSPECIFIED CHRONICITY, UNSPECIFIED SITE: Primary | ICD-10-CM

## 2018-05-21 DIAGNOSIS — I10 BENIGN ESSENTIAL HYPERTENSION: ICD-10-CM

## 2018-05-21 PROCEDURE — 99213 OFFICE O/P EST LOW 20 MIN: CPT | Performed by: FAMILY MEDICINE

## 2018-05-21 PROCEDURE — 96372 THER/PROPH/DIAG INJ SC/IM: CPT | Performed by: FAMILY MEDICINE

## 2018-05-21 RX ORDER — METHYLPREDNISOLONE ACETATE 40 MG/ML
40 INJECTION, SUSPENSION INTRA-ARTICULAR; INTRALESIONAL; INTRAMUSCULAR; SOFT TISSUE ONCE
Status: COMPLETED | OUTPATIENT
Start: 2018-05-21 | End: 2018-05-21

## 2018-05-21 RX ORDER — METHYLPREDNISOLONE ACETATE 80 MG/ML
80 INJECTION, SUSPENSION INTRA-ARTICULAR; INTRALESIONAL; INTRAMUSCULAR; SOFT TISSUE ONCE
Status: COMPLETED | OUTPATIENT
Start: 2018-05-21 | End: 2018-05-21

## 2018-05-21 RX ORDER — TRIAMTERENE AND HYDROCHLOROTHIAZIDE 37.5; 25 MG/1; MG/1
1 CAPSULE ORAL DAILY
Qty: 90 CAPSULE | Refills: 3 | Status: SHIPPED | OUTPATIENT
Start: 2018-05-21 | End: 2018-06-29 | Stop reason: ALTCHOICE

## 2018-05-21 RX ADMIN — METHYLPREDNISOLONE ACETATE 40 MG: 40 INJECTION, SUSPENSION INTRA-ARTICULAR; INTRALESIONAL; INTRAMUSCULAR; SOFT TISSUE at 11:21

## 2018-05-21 RX ADMIN — METHYLPREDNISOLONE ACETATE 80 MG: 80 INJECTION, SUSPENSION INTRA-ARTICULAR; INTRALESIONAL; INTRAMUSCULAR; SOFT TISSUE at 11:22

## 2018-05-21 NOTE — PROGRESS NOTES
"Subjective     Chief Complaint   Patient presents with   • Gout       Clinton Ramirez is a 83 y.o. male.     History of Present Illness left great toe joint painful minimally red minimal swollen.  Similar to what briefly experienced a few weeks ago.  No other joint concerns presently.  Has been fairly tolerant without the naproxen which was stopped secondary to mild decline in renal function.    The following portions of the patient's history were reviewed and updated as appropriate: allergies, current medications, past medical history, past social history, past surgical history and problem list.    Review of Systems left great toe    Objective   Physical Exam   Constitutional: He is oriented to person, place, and time. He appears well-developed and well-nourished.   Cardiovascular: Normal rate, regular rhythm and intact distal pulses.    Murmur heard.  Pulmonary/Chest: Effort normal and breath sounds normal.   Musculoskeletal: Normal range of motion. He exhibits no edema.   Left great toe MTJ minimal tender minimal inflamed.   Neurological: He is alert and oriented to person, place, and time.   Skin: Skin is warm and dry.   Psychiatric: He has a normal mood and affect.   Vitals reviewed.    /69 (BP Location: Right arm, Patient Position: Sitting, Cuff Size: Adult)   Pulse 75   Temp 97.4 °F (36.3 °C) (Oral)   Ht 174 cm (68.5\")   Wt 81.8 kg (180 lb 4.8 oz)   SpO2 97%   BMI 27.01 kg/m²   Assessment/Plan   Clinton was seen today for gout.    Diagnoses and all orders for this visit:    Idiopathic gout, unspecified chronicity, unspecified site  -     methylPREDNISolone acetate (DEPO-medrol) injection 40 mg; Inject 1 mL into the shoulder, thigh, or buttocks 1 (One) Time.  -     methylPREDNISolone acetate (DEPO-medrol) injection 80 mg; Inject 1 mL into the shoulder, thigh, or buttocks 1 (One) Time.    Benign essential hypertension  -     triamterene-hydrochlorothiazide (DYAZIDE) 37.5-25 MG per capsule; Take 1 " capsule by mouth Daily.     This is clinically consistent with gout.  Will treat as noted.  I hope to avoid NSAID.  Will check uric acid level at next blood monitoring.  Follow-up as scheduled and as needed.  Will continue to pursue cardiology referral.

## 2018-05-29 ENCOUNTER — OFFICE VISIT (OUTPATIENT)
Dept: CARDIOLOGY | Facility: CLINIC | Age: 83
End: 2018-05-29

## 2018-05-29 VITALS
BODY MASS INDEX: 26.07 KG/M2 | HEART RATE: 83 BPM | DIASTOLIC BLOOD PRESSURE: 76 MMHG | HEIGHT: 69 IN | OXYGEN SATURATION: 94 % | SYSTOLIC BLOOD PRESSURE: 122 MMHG | WEIGHT: 176 LBS

## 2018-05-29 DIAGNOSIS — I38 VALVULAR HEART DISEASE: ICD-10-CM

## 2018-05-29 DIAGNOSIS — R06.09 DYSPNEA ON EXERTION: ICD-10-CM

## 2018-05-29 DIAGNOSIS — I45.10 RIGHT BUNDLE BRANCH BLOCK: ICD-10-CM

## 2018-05-29 DIAGNOSIS — R01.1 HEART MURMUR: ICD-10-CM

## 2018-05-29 DIAGNOSIS — R07.9 CHEST PAIN IN ADULT: Primary | ICD-10-CM

## 2018-05-29 DIAGNOSIS — I49.8 SINUS ARRHYTHMIA: ICD-10-CM

## 2018-05-29 PROCEDURE — 99204 OFFICE O/P NEW MOD 45 MIN: CPT | Performed by: INTERNAL MEDICINE

## 2018-05-29 PROCEDURE — 93000 ELECTROCARDIOGRAM COMPLETE: CPT | Performed by: INTERNAL MEDICINE

## 2018-05-29 NOTE — PROGRESS NOTES
subjective     Chief Complaint   Patient presents with   • Other     Abnormal Echo results    • Extremity Weakness   • Chest Pain     History of Present Illness  Patient is 83 years old elderly gentleman who appears to be much younger than his stated age  He is here today for cardiac evaluation because of chest pain and abnormal echo    He describes chest pressure sensation in the substernal area which has been going on for last few months and is getting mildly worse.  Patient is quite active and states that with exercise he gets tired fatigue and out of breath.  Chest fullness also slightly gets worse.  Severity of the pain is mild around 2 out of 10.  Patient has no history of coronary artery disease.  Recently he was found to have a heart murmur and was sent for echocardiogram.  Echo showed mild to moderate aortic stenosis with aortic valve area of around 1.8 there was also mild mitral regurgitation.  LV ejection fraction was normal.    Other problems consist of hypertension and hyperlipidemia.    Past Surgical History:   Procedure Laterality Date   • BIOPSY PROSTATE NEEDLE / PUNCH / INCISIONAL     • EAR BIOPSY       Family History   Problem Relation Age of Onset   • Cancer Sister    • Tuberculosis Other    • Diabetes Other    • Heart disease Father      Past Medical History:   Diagnosis Date   • Hypertension    • Hypokalemia    • Multiple benign polyps of large intestine    • Need for prophylactic vaccination and inoculation against influenza    • Pancreatitis    • Paralytic ileus      Patient Active Problem List   Diagnosis   • Bone spur   • Cervical radiculopathy   • Elevated prostate specific antigen (PSA)   • Hypercholesterolemia   • Essential hypertension   • Impaired fasting glucose   • Osteoarthritis   • Recent skin changes   • Shoulder joint pain   • Tinnitus   • Heart murmur   • Valvular heart disease, mild to moderate AS   • Chest pain in adult   • Dyspnea on exertion   • Right bundle branch block   •  "Sinus arrhythmia       Social History   Substance Use Topics   • Smoking status: Former Smoker   • Smokeless tobacco: Never Used   • Alcohol use Yes      Comment: occasional       No Known Allergies    Current Outpatient Prescriptions on File Prior to Visit   Medication Sig   • aspirin 81 MG chewable tablet Chew 81 mg daily.   • felodipine (PLENDIL) 5 MG 24 hr tablet Take 1 tablet by mouth Daily.   • lovastatin (MEVACOR) 40 MG tablet Take 1 tablet by mouth every night at bedtime.   • sildenafil (VIAGRA) 100 MG tablet Take 1 tablet by mouth as needed.   • triamterene-hydrochlorothiazide (DYAZIDE) 37.5-25 MG per capsule Take 1 capsule by mouth Daily.     No current facility-administered medications on file prior to visit.          The following portions of the patient's history were reviewed and updated as appropriate: allergies, current medications, past family history, past medical history, past social history, past surgical history and problem list.    Review of Systems   Constitution: Positive for weakness and malaise/fatigue.   HENT: Negative.  Negative for congestion.    Eyes: Negative.    Cardiovascular: Positive for chest pain and dyspnea on exertion. Negative for cyanosis, irregular heartbeat, leg swelling, near-syncope, orthopnea, palpitations, paroxysmal nocturnal dyspnea and syncope.   Respiratory: Positive for shortness of breath.    Hematologic/Lymphatic: Negative.    Musculoskeletal: Negative.    Gastrointestinal: Negative.    Neurological: Negative for headaches.   All other systems reviewed and are negative.         Objective:     /76 (BP Location: Left arm, Patient Position: Sitting)   Pulse 83   Ht 174 cm (68.5\")   Wt 79.8 kg (176 lb)   SpO2 94%   BMI 26.37 kg/m²   Physical Exam   Constitutional: He appears well-developed and well-nourished. No distress.   HENT:   Head: Normocephalic and atraumatic.   Mouth/Throat: Oropharynx is clear and moist. No oropharyngeal exudate.   Eyes: " Conjunctivae and EOM are normal. Pupils are equal, round, and reactive to light. No scleral icterus.   Neck: Normal range of motion. Neck supple. No JVD present. No tracheal deviation present. No thyromegaly present.   Cardiovascular: Normal rate, regular rhythm and intact distal pulses.  PMI is not displaced.  Exam reveals no gallop, no friction rub and no decreased pulses.    Murmur heard.  Pulses:       Carotid pulses are 3+ on the right side, and 3+ on the left side.       Radial pulses are 3+ on the right side, and 3+ on the left side.   Pulmonary/Chest: Effort normal and breath sounds normal. No respiratory distress. He has no wheezes. He has no rales. He exhibits no tenderness.   Abdominal: Soft. Bowel sounds are normal. He exhibits no distension, no abdominal bruit and no mass. There is no splenomegaly or hepatomegaly. There is no tenderness. There is no rebound and no guarding.   Musculoskeletal: Normal range of motion. He exhibits no edema, tenderness or deformity.   Lymphadenopathy:     He has no cervical adenopathy.   Neurological: He is alert. He has normal reflexes. No cranial nerve deficit. He exhibits normal muscle tone. Coordination normal.   Skin: Skin is warm and dry. No rash noted. He is not diaphoretic. No erythema.   Psychiatric: He has a normal mood and affect. His behavior is normal. Judgment and thought content normal.         Lab Review  Lab Results   Component Value Date     05/08/2018    K 3.7 05/08/2018     05/08/2018    BUN 18 05/08/2018    CREATININE 1.45 (H) 05/08/2018    GLUCOSE 155 (H) 05/08/2018    CALCIUM 9.4 05/08/2018    ALT 17 05/08/2018    ALKPHOS 69 05/08/2018    LABIL2 1.2 (L) 05/08/2018     No results found for: CKTOTAL  Lab Results   Component Value Date    WBC 8.72 05/08/2018    HGB 14.4 05/08/2018    HCT 40.4 (L) 05/08/2018     05/08/2018     No results found for: INR  No results found for: MG  Lab Results   Component Value Date    TSH 3.773 11/08/2017      No results found for: BNP  Lab Results   Component Value Date    CHLPL 166 05/12/2016    CHOL 159 05/08/2018    TRIG 144 05/08/2018    HDL 41 (L) 05/08/2018    VLDL 28.8 05/08/2018    LDLHDL 2.18 05/08/2018     Lab Results   Component Value Date    LDL 89 05/08/2018     (H) 11/08/2017         ECG 12 Lead  Date/Time: 5/29/2018 5:27 PM  Performed by: KARLEE SILVA  Authorized by: KARLEE SILVA   Comparison: not compared with previous ECG   Previous ECG: no previous ECG available  Rhythm: sinus rhythm  Rate: normal  Conduction: right bundle branch block  QRS axis: normal  Clinical impression: abnormal ECG  Comments: Sinus arrhythmia, right bundle branch block and secondary ST and T changes               I personally viewed and interpreted the patient's LAB data         Assessment:     1. Chest pain in adult    2. Valvular heart disease, mild to moderate AS    3. Heart murmur    4. Right bundle branch block    5. Sinus arrhythmia    6. Dyspnea on exertion          Plan:      Patient is 83 years old white male who has multiple cardiac risk factors including hypertension and hyperlipidemia.  Patient also has long history of tobacco use.  Currently he does not smoke.  Now he complains of dyspnea on exertion and chest discomfort.  Further cardiac workup is indicated.  We will get a stress test for further evaluation.  EKG shows sinus arrhythmia and right bundle branch block with secondary ST and T wave changes.    Echocardiogram report was reviewed.  Mild to moderate aortic stenosis with aortic valve area 1.8 and mild mitral regurgitation.  There is no evidence of heart failure.  We will repeat an echocardiogram in one year.    Patient will continue current medications.  Further decision regarding therapy after stress test report available      The nature of cardiac risk has been fully discussed with this patient. I have made him aware of his LDL target goal given his cardiovascular risk  analysis. I have discussed the appropriate diet. The need for lifelong compliance in order to reduce risk is stressed. A regular exercise program is recommended to help achieve and maintain normal body weight, fitness and improve lipid balance.        No Follow-up on file.

## 2018-06-28 ENCOUNTER — HOSPITAL ENCOUNTER (OUTPATIENT)
Dept: CARDIOLOGY | Facility: HOSPITAL | Age: 83
Discharge: HOME | End: 2018-06-28
Attending: INTERNAL MEDICINE

## 2018-06-28 ENCOUNTER — HOSPITAL ENCOUNTER (OUTPATIENT)
Dept: NUCLEAR MEDICINE | Facility: HOSPITAL | Age: 83
Discharge: HOME | End: 2018-06-28
Attending: INTERNAL MEDICINE

## 2018-06-28 DIAGNOSIS — R07.9 CHEST PAIN IN ADULT: ICD-10-CM

## 2018-06-28 DIAGNOSIS — I38 VALVULAR HEART DISEASE: ICD-10-CM

## 2018-06-28 PROCEDURE — A9500 TC99M SESTAMIBI: HCPCS | Performed by: INTERNAL MEDICINE

## 2018-06-28 PROCEDURE — 0 TECHNETIUM SESTAMIBI: Performed by: INTERNAL MEDICINE

## 2018-06-28 PROCEDURE — 93017 CV STRESS TEST TRACING ONLY: CPT

## 2018-06-28 PROCEDURE — 78452 HT MUSCLE IMAGE SPECT MULT: CPT

## 2018-06-28 PROCEDURE — 93018 CV STRESS TEST I&R ONLY: CPT | Performed by: INTERNAL MEDICINE

## 2018-06-28 PROCEDURE — 25010000002 REGADENOSON 0.4 MG/5ML SOLUTION: Performed by: INTERNAL MEDICINE

## 2018-06-28 PROCEDURE — 78452 HT MUSCLE IMAGE SPECT MULT: CPT | Performed by: INTERNAL MEDICINE

## 2018-06-28 RX ADMIN — TECHNETIUM TC 99M SESTAMIBI 1 DOSE: 1 INJECTION INTRAVENOUS at 07:55

## 2018-06-28 RX ADMIN — REGADENOSON 0.4 MG: 0.08 INJECTION, SOLUTION INTRAVENOUS at 09:35

## 2018-06-28 RX ADMIN — TECHNETIUM TC 99M SESTAMIBI 1 DOSE: 1 INJECTION INTRAVENOUS at 09:35

## 2018-06-29 ENCOUNTER — TELEPHONE (OUTPATIENT)
Dept: CARDIOLOGY | Facility: CLINIC | Age: 83
End: 2018-06-29

## 2018-06-29 DIAGNOSIS — R94.39 ABNORMAL STRESS ECHO: Primary | ICD-10-CM

## 2018-06-29 LAB
BH CV NUCLEAR PRIOR STUDY: 3
BH CV STRESS BP STAGE 1: NORMAL
BH CV STRESS BP STAGE 2: NORMAL
BH CV STRESS COMMENTS STAGE 1: NORMAL
BH CV STRESS COMMENTS STAGE 2: NORMAL
BH CV STRESS DOSE REGADENOSON STAGE 1: 0.4
BH CV STRESS DURATION MIN STAGE 1: 0
BH CV STRESS DURATION MIN STAGE 2: 4
BH CV STRESS DURATION SEC STAGE 1: 10
BH CV STRESS DURATION SEC STAGE 2: 0
BH CV STRESS HR STAGE 1: 84
BH CV STRESS HR STAGE 2: 70
BH CV STRESS PROTOCOL 1: NORMAL
BH CV STRESS RECOVERY BP: NORMAL MMHG
BH CV STRESS RECOVERY HR: 70 BPM
BH CV STRESS STAGE 1: 1
BH CV STRESS STAGE 2: 2
LV EF NUC BP: 74 %
MAXIMAL PREDICTED HEART RATE: 137 BPM
PERCENT MAX PREDICTED HR: 61.31 %
STRESS BASELINE BP: NORMAL MMHG
STRESS BASELINE HR: 81 BPM
STRESS PERCENT HR: 72 %
STRESS POST PEAK BP: NORMAL MMHG
STRESS POST PEAK HR: 84 BPM
STRESS TARGET HR: 116 BPM

## 2018-06-29 RX ORDER — METOPROLOL SUCCINATE 25 MG/1
12.5 TABLET, EXTENDED RELEASE ORAL DAILY
Qty: 45 TABLET | Refills: 3 | Status: ON HOLD | OUTPATIENT
Start: 2018-06-29 | End: 2018-07-11

## 2018-06-29 NOTE — TELEPHONE ENCOUNTER
Attempted to reach patient about consult with  unable to reach patient will try again at a later time.

## 2018-06-29 NOTE — TELEPHONE ENCOUNTER
Explained results to patient. Patient voiced understanding, however patient request . Referral was made today.

## 2018-06-29 NOTE — TELEPHONE ENCOUNTER
----- Message from Sandra Torres MD sent at 6/29/2018  9:03 AM EDT -----  Stress test is abnormal.  Schedule coronary angiography Dr. Villa

## 2018-06-29 NOTE — TELEPHONE ENCOUNTER
----- Message from Sandra Torres MD sent at 6/29/2018  9:05 AM EDT -----  DC Dyazide, take metoprolol ER 25 mg half daily

## 2018-07-09 DIAGNOSIS — R94.39 ABNORMAL STRESS TEST: Primary | ICD-10-CM

## 2018-07-10 ENCOUNTER — APPOINTMENT (OUTPATIENT)
Dept: PREADMISSION TESTING | Facility: HOSPITAL | Age: 83
End: 2018-07-10

## 2018-07-10 ENCOUNTER — PREP FOR SURGERY (OUTPATIENT)
Dept: OTHER | Facility: HOSPITAL | Age: 83
End: 2018-07-10

## 2018-07-10 DIAGNOSIS — I20.9 ANGINA PECTORIS (HCC): ICD-10-CM

## 2018-07-10 DIAGNOSIS — I20.9 ANGINA PECTORIS (HCC): Primary | ICD-10-CM

## 2018-07-10 PROBLEM — N28.9 ACUTE KIDNEY INSUFFICIENCY: Status: ACTIVE | Noted: 2018-07-10

## 2018-07-10 LAB
ALBUMIN SERPL-MCNC: 3.91 G/DL (ref 3.2–4.8)
ALBUMIN/GLOB SERPL: 1.3 G/DL (ref 1.5–2.5)
ALP SERPL-CCNC: 72 U/L (ref 25–100)
ALT SERPL W P-5'-P-CCNC: 16 U/L (ref 7–40)
ANION GAP SERPL CALCULATED.3IONS-SCNC: 7 MMOL/L (ref 3–11)
AST SERPL-CCNC: 22 U/L (ref 0–33)
BASOPHILS # BLD AUTO: 0.02 10*3/MM3 (ref 0–0.2)
BASOPHILS NFR BLD AUTO: 0.3 % (ref 0–1)
BILIRUB SERPL-MCNC: 0.4 MG/DL (ref 0.3–1.2)
BUN BLD-MCNC: 15 MG/DL (ref 9–23)
BUN/CREAT SERPL: 12.4 (ref 7–25)
CALCIUM SPEC-SCNC: 8.5 MG/DL (ref 8.7–10.4)
CHLORIDE SERPL-SCNC: 106 MMOL/L (ref 99–109)
CO2 SERPL-SCNC: 27 MMOL/L (ref 20–31)
CREAT BLD-MCNC: 1.21 MG/DL (ref 0.6–1.3)
DEPRECATED RDW RBC AUTO: 41.7 FL (ref 37–54)
EOSINOPHIL # BLD AUTO: 0.16 10*3/MM3 (ref 0–0.3)
EOSINOPHIL NFR BLD AUTO: 2.1 % (ref 0–3)
ERYTHROCYTE [DISTWIDTH] IN BLOOD BY AUTOMATED COUNT: 12.8 % (ref 11.3–14.5)
GFR SERPL CREATININE-BSD FRML MDRD: 57 ML/MIN/1.73
GLOBULIN UR ELPH-MCNC: 3 GM/DL
GLUCOSE BLD-MCNC: 180 MG/DL (ref 70–100)
HBA1C MFR BLD: 5.9 % (ref 4.8–5.6)
HCT VFR BLD AUTO: 38.1 % (ref 38.9–50.9)
HGB BLD-MCNC: 13.1 G/DL (ref 13.1–17.5)
IMM GRANULOCYTES # BLD: 0.02 10*3/MM3 (ref 0–0.03)
IMM GRANULOCYTES NFR BLD: 0.3 % (ref 0–0.6)
LYMPHOCYTES # BLD AUTO: 1.75 10*3/MM3 (ref 0.6–4.8)
LYMPHOCYTES NFR BLD AUTO: 22.4 % (ref 24–44)
MCH RBC QN AUTO: 31 PG (ref 27–31)
MCHC RBC AUTO-ENTMCNC: 34.4 G/DL (ref 32–36)
MCV RBC AUTO: 90.1 FL (ref 80–99)
MONOCYTES # BLD AUTO: 0.48 10*3/MM3 (ref 0–1)
MONOCYTES NFR BLD AUTO: 6.2 % (ref 0–12)
NEUTROPHILS # BLD AUTO: 5.39 10*3/MM3 (ref 1.5–8.3)
NEUTROPHILS NFR BLD AUTO: 69 % (ref 41–71)
PLATELET # BLD AUTO: 289 10*3/MM3 (ref 150–450)
PMV BLD AUTO: 10.5 FL (ref 6–12)
POTASSIUM BLD-SCNC: 3.6 MMOL/L (ref 3.5–5.5)
PROT SERPL-MCNC: 6.9 G/DL (ref 5.7–8.2)
RBC # BLD AUTO: 4.23 10*6/MM3 (ref 4.2–5.76)
SODIUM BLD-SCNC: 140 MMOL/L (ref 132–146)
WBC NRBC COR # BLD: 7.8 10*3/MM3 (ref 3.5–10.8)

## 2018-07-10 PROCEDURE — 85025 COMPLETE CBC W/AUTO DIFF WBC: CPT | Performed by: PHYSICIAN ASSISTANT

## 2018-07-10 PROCEDURE — 93010 ELECTROCARDIOGRAM REPORT: CPT | Performed by: INTERNAL MEDICINE

## 2018-07-10 PROCEDURE — 80053 COMPREHEN METABOLIC PANEL: CPT | Performed by: PHYSICIAN ASSISTANT

## 2018-07-10 PROCEDURE — 36415 COLL VENOUS BLD VENIPUNCTURE: CPT

## 2018-07-10 PROCEDURE — 93005 ELECTROCARDIOGRAM TRACING: CPT

## 2018-07-10 PROCEDURE — 83036 HEMOGLOBIN GLYCOSYLATED A1C: CPT | Performed by: PHYSICIAN ASSISTANT

## 2018-07-10 PROCEDURE — S0260 H&P FOR SURGERY: HCPCS | Performed by: INTERNAL MEDICINE

## 2018-07-10 RX ORDER — SODIUM CHLORIDE 0.9 % (FLUSH) 0.9 %
1-10 SYRINGE (ML) INJECTION AS NEEDED
Status: CANCELLED | OUTPATIENT
Start: 2018-07-10

## 2018-07-10 RX ORDER — ASPIRIN 325 MG
325 TABLET ORAL ONCE
Status: CANCELLED | OUTPATIENT
Start: 2018-07-10 | End: 2018-07-10

## 2018-07-10 RX ORDER — NITROGLYCERIN 0.4 MG/1
0.4 TABLET SUBLINGUAL
Status: CANCELLED | OUTPATIENT
Start: 2018-07-10

## 2018-07-10 RX ORDER — ONDANSETRON 2 MG/ML
4 INJECTION INTRAMUSCULAR; INTRAVENOUS EVERY 6 HOURS PRN
Status: CANCELLED | OUTPATIENT
Start: 2018-07-10

## 2018-07-10 RX ORDER — ASPIRIN 325 MG
325 TABLET, DELAYED RELEASE (ENTERIC COATED) ORAL DAILY
Status: CANCELLED | OUTPATIENT
Start: 2018-07-11

## 2018-07-10 RX ORDER — ACETAMINOPHEN 325 MG/1
650 TABLET ORAL EVERY 4 HOURS PRN
Status: CANCELLED | OUTPATIENT
Start: 2018-07-10

## 2018-07-10 RX ORDER — IBUPROFEN 200 MG
200 TABLET ORAL EVERY 6 HOURS PRN
COMMUNITY

## 2018-07-10 NOTE — H&P
[facility]        Date of Hospital Visit: 18      Place of Service: [facility]    Patient Name: Clinton Ramirez  :1935     Referral Provider: Sandra Torres MD  Primary Care Provider: Nolberto Navarro MD    Chief complaint/Reason for Consultation:  chest pain    Patient Active Problem List    Diagnosis   • Abnormal stress test [R94.39]     Priority: High     Overview Note:     1. MPS 18:  · Baseline ECG rhythm of sinus bradycardia noted. PACs, PVCs and right bundle branch block noted.  · Findings consistent with a normal ECG stress test.  · Myocardial perfusion imaging indicates a small-to-medium-sized, moderately severe area of ischemia located in the apex and inferior wall.  · Left ventricular ejection fraction is hyperdynamic (Calculated EF > 70%).     • Angina pectoris (CMS/HCC) [I20.9]     Priority: High   • Dyspnea on exertion [R06.09]     Priority: High   • Heart murmur [R01.1]     Priority: Medium   • Valvular heart disease, mild to moderate AS [I38]     Priority: Medium     Overview Note:     1. Echo 18  · Normal left ventricular cavity size and wall thickness noted. All left ventricular wall segments contract normally.  · Left ventricular diastolic dysfunction (grade I) consistent with impaired relaxation.  · Estimated EF appears to be in the range of 61 - 65%.  · The aortic valve is abnormal in structure. There is calcification of the aortic valve mainly affecting the non, left and right coronary cusp(s).Mild aortic valve regurgitation is present. Mild to moderate aortic valve stenosis is present.  · Ao max PG 18.1 mmHg Ao mean PG 9.1 mmHg Ao V2 VTI 52.2 cm CORIN(I,D) 1.8 cm^2  · The mitral valve is abnormal in structure. Moderate MAC is present. Mild mitral valve regurgitation is present. No significant mitral valve stenosis is present.   • Hypercholesterolemia [E78.00]     Priority: Medium   • Essential hypertension [I10]      Priority: Medium   • Right bundle branch block [I45.10]     Priority: Low   • Sinus arrhythmia [I49.9]     Priority: Low   • History of kidney disease [Z87.448]   • Bone spur [M77.9]   • Cervical radiculopathy [M54.12]   • Elevated prostate specific antigen (PSA) [R97.20]   • Impaired fasting glucose [R73.01]   • Osteoarthritis [M19.90]   • Recent skin changes [R23.4]   • Shoulder joint pain [M25.519]   • Tinnitus [H93.19]   History of Present Illness:  This is an 83-year-old hypertensive dyslipidemic male with no prior cardiac history.  He was recently found to have a murmur by primary care.  He was referred to Dr. Torres for further evaluation.  On analysis there he admitted to having exertional chest pain and dyspnea for approximately one month.  He describes the chest discomfort as midsternal heaviness that is random in occurrence lasts for less than 5 minutes and is about 4/10 in intensity.  He had an echocardiogram which showed noncritical valvular heart disease.  A myocardial perfusion study was obtained which showed normal EF and apical and inferior reversible ischemia.  He was referred for cardiac catheterization.  He has no history of congestive heart failure, nor orthopnea no PND no claudication.  He has no awareness of tachycardia arrhythmias, no dizziness or syncope.  His history of mild renal insufficiency which has been monitored for the past 2 years.  At present his serum creatinine is within normal limits.    Past Medical History:   Diagnosis Date   • Arthritis     hands   • Elevated cholesterol    • Hypertension    • Hypokalemia    • Multiple benign polyps of large intestine    • Need for prophylactic vaccination and inoculation against influenza    • Pancreatitis    • Paralytic ileus (CMS/HCC)    • PPD positive     negative chest xray    • Renal insufficiency     mild-told to stop Naproxen   • Rheumatic fever     age 20 while in -hospitalized    • Skin cancer     basal cell from ears,  melanoma from neck area    • Valvular disease    • Wears glasses    • Wears partial dentures     upper        Past Surgical History:   Procedure Laterality Date   • BIOPSY PROSTATE NEEDLE / PUNCH / INCISIONAL     • COLONOSCOPY     • EAR BIOPSY      basal cell skin cancer   • SKIN CANCER EXCISION      melanoma from neck        No Known Allergies    Prescriptions Prior to Admission   Medication Sig Dispense Refill Last Dose   • aspirin 81 MG chewable tablet Chew 81 mg Every Morning.   7/10/2018 at Unknown time   • felodipine (PLENDIL) 5 MG 24 hr tablet Take 1 tablet by mouth Daily. (Patient taking differently: Take 5 mg by mouth Every Morning.) 90 tablet 3 7/10/2018 at Unknown time   • ibuprofen (ADVIL,MOTRIN) 200 MG tablet Take 200 mg by mouth Every 6 (Six) Hours As Needed for Mild Pain .   7/10/2018 at Unknown time   • lovastatin (MEVACOR) 40 MG tablet Take 1 tablet by mouth every night at bedtime. (Patient taking differently: Take 40 mg by mouth Every Morning.) 90 tablet 3 7/10/2018 at Unknown time   • metoprolol succinate XL (TOPROL-XL) 25 MG 24 hr tablet Take 0.5 tablets by mouth Daily. (Patient taking differently: Take 12.5 mg by mouth Every Morning.) 45 tablet 3 7/10/2018 at Unknown time   • sildenafil (VIAGRA) 100 MG tablet Take 1 tablet by mouth as needed.   More than a month at Unknown time         Current Facility-Administered Medications:   •  acetaminophen (TYLENOL) tablet 650 mg, 650 mg, Oral, Q4H PRN, SONA Vincent  •  [COMPLETED] aspirin tablet 325 mg, 325 mg, Oral, Once, 325 mg at 07/11/18 0738 **AND** [START ON 7/12/2018] aspirin EC tablet 325 mg, 325 mg, Oral, Daily, SONA Vincent  •  nitroglycerin (NITROSTAT) SL tablet 0.4 mg, 0.4 mg, Sublingual, Q5 Min PRN, SONA Vincent  •  ondansetron (ZOFRAN) injection 4 mg, 4 mg, Intravenous, Q6H PRN, SONA Vincent  •  sodium chloride 0.9 % flush 1-10 mL, 1-10 mL, Intravenous, PRN, SONA Vincent  •  sodium chloride 0.9 %  "infusion, 1-3 mL/kg/hr, Intravenous, Continuous, Claire Scott MD, Last Rate: 242.4 mL/hr at 07/11/18 0739, 3 mL/kg/hr at 07/11/18 0739      Social History     Social History   • Marital status:      Spouse name: N/A   • Number of children: N/A   • Years of education: N/A     Occupational History   • Not on file.     Social History Main Topics   • Smoking status: Former Smoker     Packs/day: 3.00     Years: 11.00     Quit date: 1986   • Smokeless tobacco: Never Used   • Alcohol use 4.2 oz/week     7 Cans of beer per week      Comment: 1 beer daily sometimes 2    • Drug use: No   • Sexual activity: Defer     Other Topics Concern   • Not on file     Social History Narrative   • No narrative on file       Family History   Problem Relation Age of Onset   • Cancer Sister    • Tuberculosis Other    • Diabetes Other    • Heart disease Father        REVIEW OF SYSTEMS:   Review of Systems   Constitution: Negative.   HENT: Negative.    Eyes: Negative.    Cardiovascular: Positive for chest pain and dyspnea on exertion.   Respiratory: Negative.    Endocrine: Negative.    Hematologic/Lymphatic: Negative.    Skin: Negative.    Musculoskeletal: Negative.    Gastrointestinal: Negative.    Genitourinary: Negative.    Neurological: Negative.    Psychiatric/Behavioral: Negative.    Allergic/Immunologic: Negative.    All other systems reviewed and are negative.    Objective:  Vitals:    07/11/18 0719 07/11/18 0722   BP: 155/67 147/92   BP Location: Left arm Right arm   Patient Position: Lying Lying   Pulse:  52   Resp:  16   Temp:  98.1 °F (36.7 °C)   TempSrc:  Temporal Artery    SpO2:  98%   Weight:  80.8 kg (178 lb 2.1 oz)   Height:  172.7 cm (68\")     Body mass index is 27.08 kg/m².    No intake or output data in the 24 hours ending 07/11/18 0806    Physical Exam   General: No acute distress, well-developed and well-nourished.    Skin: Skin is warm and dry. No obvious cyanosis, erythema or pallor.   HEENT: Atraumatic, " normocephalic, no conjunctival pallor, no scleral icterus.   Neck: Supple, no JVD. Normal carotid upstrokes, no bruits.    Chest:No respiratory distres No chest wall tenderness. Breath sounds are normal. No wheezes,  rhonchi or rales.  Cardiovascular: Normal S1 and S2, no murmer, gallop or rub. PMI is not displaced.    Pulses:Radial and pedal pulses are 2+ and symmetric.    Abdomen: Soft, non tender, normal bowel sounds.   Musculoskeletal/Extremities:  No clubbing, cyanosis or edema. No gross deformity.   Neurological: Alert and oriented to person, place, and time, no gross focal deficits.   Psychiatric: Normal mood and affect.Speech and behavior is normal.    Barbaeu Test:  Left: Normal  (oxymetric Allens) Right: Not Assessed     Lab Review:                  Results from last 7 days  Lab Units 07/10/18  1441   SODIUM mmol/L 140   POTASSIUM mmol/L 3.6   CHLORIDE mmol/L 106   CO2 mmol/L 27.0   BUN mg/dL 15   CREATININE mg/dL 1.21   GLUCOSE mg/dL 180*   CALCIUM mg/dL 8.5*           Results from last 7 days  Lab Units 07/10/18  1441   WBC 10*3/mm3 7.80   HEMOGLOBIN g/dL 13.1   HEMATOCRIT % 38.1*   PLATELETS 10*3/mm3 289               Results from last 7 days  Lab Units 07/11/18  0713   CHOLESTEROL mg/dL 148   TRIGLYCERIDES mg/dL 106   HDL CHOL mg/dL 39*   LDL CHOL mg/dL 94             Component      Latest Ref Rng & Units 11/12/2015 5/12/2016 11/14/2016 5/8/2017   Creatinine      0.43 - 1.29 mg/dL 1.14 1.08 1.20 1.31 (H)     Component      Latest Ref Rng & Units 11/8/2017   Creatinine      0.43 - 1.29 mg/dL 1.33 (H)       EKG: SR, Sinus arrhythmia, RBBB            Assessment:   · Angina with abnormal myocardial perfusion study  · Noncritical Valvular heart disease with mild aortic stenosis  · HTN  · Hyperlipidemia    Plan:   · LHC possible CBI  · The procedure was explained to the patient/family extensively. Indications, benefits, risks and alternatives were discussed. The patient understands well, and wishes to  proceed.     Scribed for Claire Scott MD by Drew Renee PA-C. 7/11/2018  8:06 AM     I, Claire Scott MD, personally performed the services described in this documentation as scribed by the above named individual in my presence, and it is both accurate and complete.  7/11/2018  9:03 AM

## 2018-07-10 NOTE — DISCHARGE INSTRUCTIONS
"Dear Patient,    Drink plenty of fluids the day before to ensure you are well hydrated, unless otherwise directed by your physician.    Do NOT eat after midnight the night before your procedure.   You may have clear liquids only up to three hours before your scheduled arrival time (Water is best, but clear liquids can also include coffee without cream or milk, fruit juice without pulp, clear broth, and clear gelatin)    We encourage you to drink 8 ounces of water three to four hours before your scheduled arrival time.    Take your medications as instructed by your doctor.    Following your procedure, be sure to drink plenty of fluids to continue flushing the kidneys.   Benefits of hydrating before and after your procedure include:    -improved hydration helps prevent potential harm to the kidneys    by flushing the contrast/dye used during your procedure    -Lower post-procedure complications    -Improved patient comfort     Do NOT smoke after midnight the night before your procedure.    Glasses and jewelry may be worn, but dentures must be removed prior to your procedure.    Leave any items you consider valuable at home.      MORNING of your Procedure, please bring the following:     -Photo ID and insurance card(s)    -ALL medications in their ORIGINAL CONTAINERS    -Co-pay and/or deductible required by your insurance   -Copy of living will or power of  document (if not brought to    Pre-Admission Testing department)   -CPAP mask and tubing, not your machine (if applicable)    -Relaxation aids (music, books, magazines)    Family members may wait in [facility] waiting area during procedure.    Need to make arrangements for transportation prior to discharge.    A handout regarding \"Heart Healthy Eating\" was provided today to encourage healthy eating habits.    Booklet published by [employer] was given in Pre-Admission testing.  This booklet is for informational purposes only.  If you have any questions about your " procedure, please speak with your physician.

## 2018-07-11 ENCOUNTER — HOSPITAL ENCOUNTER (OUTPATIENT)
Facility: HOSPITAL | Age: 83
Discharge: HOME | End: 2018-07-11
Attending: INTERNAL MEDICINE | Admitting: INTERNAL MEDICINE

## 2018-07-11 VITALS
HEART RATE: 76 BPM | WEIGHT: 178.13 LBS | SYSTOLIC BLOOD PRESSURE: 157 MMHG | TEMPERATURE: 98.1 F | BODY MASS INDEX: 27 KG/M2 | RESPIRATION RATE: 18 BRPM | OXYGEN SATURATION: 97 % | HEIGHT: 68 IN | DIASTOLIC BLOOD PRESSURE: 84 MMHG

## 2018-07-11 DIAGNOSIS — R94.39 ABNORMAL STRESS TEST: ICD-10-CM

## 2018-07-11 PROBLEM — Z87.448 HISTORY OF KIDNEY DISEASE: Status: ACTIVE | Noted: 2018-07-11

## 2018-07-11 LAB
ACT BLD: 213 SECONDS (ref 82–152)
ARTICHOKE IGE QN: 94 MG/DL (ref 0–130)
CHOLEST SERPL-MCNC: 148 MG/DL (ref 0–200)
HDLC SERPL-MCNC: 39 MG/DL (ref 40–60)
TRIGL SERPL-MCNC: 106 MG/DL (ref 0–150)

## 2018-07-11 PROCEDURE — 93571 IV DOP VEL&/PRESS C FLO 1ST: CPT | Performed by: INTERNAL MEDICINE

## 2018-07-11 PROCEDURE — G0378 HOSPITAL OBSERVATION PER HR: HCPCS

## 2018-07-11 PROCEDURE — C1894 INTRO/SHEATH, NON-LASER: HCPCS | Performed by: INTERNAL MEDICINE

## 2018-07-11 PROCEDURE — 80061 LIPID PANEL: CPT | Performed by: PHYSICIAN ASSISTANT

## 2018-07-11 PROCEDURE — C1769 GUIDE WIRE: HCPCS | Performed by: INTERNAL MEDICINE

## 2018-07-11 PROCEDURE — 85347 COAGULATION TIME ACTIVATED: CPT

## 2018-07-11 PROCEDURE — 25010000002 MIDAZOLAM PER 1 MG: Performed by: INTERNAL MEDICINE

## 2018-07-11 PROCEDURE — 93458 L HRT ARTERY/VENTRICLE ANGIO: CPT | Performed by: INTERNAL MEDICINE

## 2018-07-11 PROCEDURE — 36415 COLL VENOUS BLD VENIPUNCTURE: CPT

## 2018-07-11 PROCEDURE — 0 IOPAMIDOL PER 1 ML: Performed by: INTERNAL MEDICINE

## 2018-07-11 PROCEDURE — 25010000002 HEPARIN (PORCINE) PER 1000 UNITS: Performed by: INTERNAL MEDICINE

## 2018-07-11 PROCEDURE — C1887 CATHETER, GUIDING: HCPCS | Performed by: INTERNAL MEDICINE

## 2018-07-11 RX ORDER — ASPIRIN 325 MG
325 TABLET ORAL ONCE
Status: COMPLETED | OUTPATIENT
Start: 2018-07-11 | End: 2018-07-11

## 2018-07-11 RX ORDER — ATORVASTATIN CALCIUM 40 MG/1
40 TABLET, FILM COATED ORAL DAILY
Qty: 90 TABLET | Refills: 3 | Status: SHIPPED | OUTPATIENT
Start: 2018-07-11 | End: 2018-07-11

## 2018-07-11 RX ORDER — SODIUM CHLORIDE 9 MG/ML
1-3 INJECTION, SOLUTION INTRAVENOUS CONTINUOUS
Status: DISCONTINUED | OUTPATIENT
Start: 2018-07-11 | End: 2018-07-11 | Stop reason: HOSPADM

## 2018-07-11 RX ORDER — ONDANSETRON 2 MG/ML
4 INJECTION INTRAMUSCULAR; INTRAVENOUS EVERY 6 HOURS PRN
Status: DISCONTINUED | OUTPATIENT
Start: 2018-07-11 | End: 2018-07-11 | Stop reason: HOSPADM

## 2018-07-11 RX ORDER — NITROGLYCERIN 0.4 MG/1
0.4 TABLET SUBLINGUAL
Status: DISCONTINUED | OUTPATIENT
Start: 2018-07-11 | End: 2018-07-11 | Stop reason: HOSPADM

## 2018-07-11 RX ORDER — SODIUM CHLORIDE 0.9 % (FLUSH) 0.9 %
1-10 SYRINGE (ML) INJECTION AS NEEDED
Status: DISCONTINUED | OUTPATIENT
Start: 2018-07-11 | End: 2018-07-11 | Stop reason: HOSPADM

## 2018-07-11 RX ORDER — METOPROLOL SUCCINATE 25 MG/1
25 TABLET, EXTENDED RELEASE ORAL DAILY
Qty: 90 TABLET | Refills: 3 | Status: SHIPPED | OUTPATIENT
Start: 2018-07-11 | End: 2019-06-10 | Stop reason: SDUPTHER

## 2018-07-11 RX ORDER — MIDAZOLAM HYDROCHLORIDE 1 MG/ML
INJECTION INTRAMUSCULAR; INTRAVENOUS AS NEEDED
Status: DISCONTINUED | OUTPATIENT
Start: 2018-07-11 | End: 2018-07-11 | Stop reason: HOSPADM

## 2018-07-11 RX ORDER — ASPIRIN 325 MG
325 TABLET, DELAYED RELEASE (ENTERIC COATED) ORAL DAILY
Status: DISCONTINUED | OUTPATIENT
Start: 2018-07-12 | End: 2018-07-11 | Stop reason: HOSPADM

## 2018-07-11 RX ORDER — SODIUM CHLORIDE 9 MG/ML
100 INJECTION, SOLUTION INTRAVENOUS CONTINUOUS
Status: DISCONTINUED | OUTPATIENT
Start: 2018-07-11 | End: 2018-07-11 | Stop reason: HOSPADM

## 2018-07-11 RX ORDER — HEPARIN SODIUM 1000 [USP'U]/ML
INJECTION, SOLUTION INTRAVENOUS; SUBCUTANEOUS AS NEEDED
Status: DISCONTINUED | OUTPATIENT
Start: 2018-07-11 | End: 2018-07-11 | Stop reason: HOSPADM

## 2018-07-11 RX ORDER — LIDOCAINE HYDROCHLORIDE 10 MG/ML
INJECTION, SOLUTION EPIDURAL; INFILTRATION; INTRACAUDAL; PERINEURAL AS NEEDED
Status: DISCONTINUED | OUTPATIENT
Start: 2018-07-11 | End: 2018-07-11 | Stop reason: HOSPADM

## 2018-07-11 RX ORDER — METOPROLOL SUCCINATE 25 MG/1
25 TABLET, EXTENDED RELEASE ORAL DAILY
Qty: 90 TABLET | Refills: 3 | Status: SHIPPED | OUTPATIENT
Start: 2018-07-11 | End: 2018-07-11

## 2018-07-11 RX ORDER — ATORVASTATIN CALCIUM 40 MG/1
40 TABLET, FILM COATED ORAL DAILY
Qty: 90 TABLET | Refills: 3 | Status: SHIPPED | OUTPATIENT
Start: 2018-07-11 | End: 2019-06-10 | Stop reason: SDUPTHER

## 2018-07-11 RX ORDER — ACETAMINOPHEN 325 MG/1
650 TABLET ORAL EVERY 4 HOURS PRN
Status: DISCONTINUED | OUTPATIENT
Start: 2018-07-11 | End: 2018-07-11 | Stop reason: HOSPADM

## 2018-07-11 RX ADMIN — ASPIRIN 325 MG ORAL TABLET 325 MG: 325 PILL ORAL at 07:38

## 2018-07-11 RX ADMIN — SODIUM CHLORIDE 3 ML/KG/HR: 9 INJECTION, SOLUTION INTRAVENOUS at 07:39

## 2018-07-13 ENCOUNTER — DOCUMENTATION (OUTPATIENT)
Dept: CARDIAC REHAB | Facility: HOSPITAL | Age: 83
End: 2018-07-13

## 2018-07-24 NOTE — TELEPHONE ENCOUNTER
Last filled on: lovastatin 3-21-17 3 refills, felodipine 3-21-17 3 refills   Last seen on: 2-27-18  Next appt: 5-10-18   Please note that your experience may vary slightly. Your doctor will give you more details about your specific procedure.      What Are Cataracts?  A clear lens in the eye focuses light. This lets the eye see images sharply. With age, the lens slowly becomes cloudy. The cloudy lens is a cataract. A cataract scatters light and makes it hard for the eye to focus. Cataracts often form in both eyes. But one lens may cloud faster than the other.                  The aging of your lens  Your lens may cloud so slowly that you don`t notice any vision changes at first. But as the cataract gets worse, the eye has a harder time focusing. In early stages, glasses may help you see better. As the lens gets more cloudy, your doctor may recommend surgery to restore your vision.                                                                                      Clear-Cornea Cataract Surgery: Removing the Old Lens     You may be surprised by how little time clear-cornea cataract surgery takes. To help make the surgery painless, you will be given anesthesia. It may be in the form of eyedrops (topical) or an injection (local).                              The Procedure  Your doctor uses a microscope and tiny instruments to make the incision and remove the old lens. A special instrument breaks apart the old lens with sound waves (ultrasound) and then removes the pieces. This process is called phacoemulsification. The natural membrane (capsule) that held your lens is left in place (in the majority of cases).                        Patient Information Sheet:    Cataract Surgery And/Or Implantation of an Intraocular Lens    This document will help you understand the risks of cataract surgery. It will also help you decide the type of replacement lens you want.  Eyeglasses or contact lenses are usually required for best vision after cataract surgery.  WHAT IS A CATARACT?   The lens in the eye can become cloudy and hard, a condition known as  a cataract.  Cataracts can develop from normal aging, from an eye injury, or if you have taken medications known as steroids.  Cataracts may cause blurred vision, dulled vision, sensitivity to light and glare, and/or ghost images.  If the cataract changes vision so much that it interferes with your daily life, the cataract may need to be removed.  Surgery is the only way to remove a cataract.  You can decide not to have the cataract removed.  If you don’t have the surgery, your vision loss from the cataract will continue to get worse.     HOW WILL REMOVING THE CATARACT AFFECT MY VISION?  The goal of cataract surgery is to correct the decreased vision that was caused by the cataract.  During the surgery, the ophthalmologist (eye surgeon) removes the cataract and puts in a new artificial lens called an intraocular lens or IOL.  The IOL will be left in the eye permanently.  Cataract surgery will not correct other causes of decreased vision, such as glaucoma, diabetes, or age-related macular degeneration.  Most people still need to wear glasses or contact lens after cataract surgery for either near and/or distance vision and astigmatism.       EXAMINATIONS PRIOR TO SURGERY  If you agree to have the surgery, you will undergo a complete eye examination by your surgeon.  This may include an examination to determine your eyeglass prescription (refraction), measurement of your vision with and without glasses (visual acuity), measurement of the pressures inside your eye (tonometry), measurement of the curvature of your cornea (keratometry), ultrasonic measurement of the length of your eye (axial length), intraocular lens calculation (biometry) to determine the best estimate of the proper power of the implanted IOL, microscopic examination of the front part of your eye (slit-lamp examination), and examination of the retina of your eye with your pupils dilated.      MORE INFORMATION ABOUT MEASURING YOUR IOL  While the method  used to calculate the power of the IOL is very accurate in most patients, the final result may be different from what you and your surgeon planned.  As the eye heals, the IOL can shift very slightly toward the front or the back of the eye.  The amount of this shift is not the same in everyone, and it may cause different vision than predicted.  If the eye’s visual power after surgery is considerably different than what was planned, surgical replacement of the IOL might be considered.  Patients who are highly nearsighted or highly farsighted have the greatest risk of differences between planned and actual outcomes.  Patients who have had LASIK or other refractive surgeries are especially difficult to measure precisely.                  PRESBYOPIA AND ALTERNATIVES FOR NEAR VISION AFTER SURGERY  Patients who have cataracts have, or will eventually develop presbyopia, which is a condition caused by aging that develops when your eye loses its ability to shift from distance to near vision.  Presbyopia is the reason that reading glasses become necessary, typically after age 40, even for people who have excellent distance and near vision without glasses.  Presbyopic individuals require bifocals or separate (different prescription) reading glasses in order to see clearly at close range.  There are several options available to you to achieve distance and near vision after cataract surgery.  This is probably the most important decision you need to make about your cataract surgery, so please take the time to review your options and ask questions.  · GLASSES. You can choose to have a monofocal (single focus) IOL implanted for distance vision and wear separate reading glasses, or have the IOL implanted for near vision and wear separate glasses for distance.     · MONOVISION. The ophthalmologist could implant IOLs with two different isabel, one for near vision in one eye, and one for distance vision in the other eye.  This  combination of a distance eye and a reading eye is called monovision.  It can allow you to read without glasses.  Many patients who wear contacts or who have had refractive surgery have monovision and are happy with it. Your surgeon will discuss this option with you.  · MULTIFOCAL IOL. The ophthalmologist could implant a “multifocal” IOL.  This is a newer, “deluxe” type of IOL that provides distance vision AND restores some or all of your eye’s ability to focus.  It corrects for both distance vision and other ranges, such as near or intermediate.  Choosing this option will usually lead to higher out-of-pocket expenses since most insurance companies only pay for a monofocal (single focus) lens. There are limitations to this type of technology and Dr. Merritt will discuss this with you if you are interested.            MORE INFORMATION ABOUT MONOVISION  In order to have good depth perception, your eyes need to be corrected for any refractive problems such as nearsightedness or farsightedness, and \"balanced\" for distance.  Eye care professionals refer to this as binocular vision.  Monovision or “blended” vision can impair depth perception to some extent, because the eyes are not focused together at the same distance.  It is important to choose which eye you will use for distance vision.  Eye surgeons generally believe that one eye is the dominant one, preferred for viewing.  This is similar to people being right- or left-handed. Several tests can be performed to determine which eye is dominant in a particular person.  With monovision, the dominant eye is usually corrected for distance, and the non-dominant eye corrected for near.  However, a very small percentage of persons may be co-dominant (this is similar to being ambidextrous).  In rare circumstances, a person may actually prefer using the dominant eye for near viewing.  Your doctor will discuss with you regarding the type of vision you will have after cataract  surgery. Because your vision is decreased by the cataract, it is not possible to show you exactly what your postoperative vision will be like.  If you would prefer not to have to wear glasses for quick tasks like looking at your cell phone, a menu, a computer, or an invoice, then you might be interested in monovision.  Most monovision patients will often be more comfortable wearing glasses to balance their vision for prolonged reading tasks or for driving (especially at night), or for sports like tennis or golf, so you will most likely still need to wear glasses even with monovision.  If you are a patient that has had monovision contacts and successfully been wearing them for some time, you may like this option. If you have not tried this before we may want to do a contact lens trial to see if you might like this. Although many patients will adjust well to monovision, some may find it uncomfortable.  For those patients, the monovision can usually be reversed by elective laser vision correction, but this surgery will not be covered by your medical insurance and Dr. Merritt would refer you to a refractive surgeon.      INFORMATION ABOUT TREATING ASTIGMATISM  Patients with nearsightedness and farsightedness often also have astigmatism.  An astigmatism is caused by an irregularly shaped cornea; instead of being round like a basketball, the cornea is shaped like a football.  This change in shape can make your vision blurry.  There are several treatment options for astigmatism:  1) you can have an IOL for near or distance vision and continue to wear glasses or contact lens for the astigmatism; 2) you can have a toric IOL placed in your eye, 3) you can have refractive surgery called LASIK or PRK or other options called limbal relaxing incisions (Dr. Huang does not perform these procedures).            ANESTHESIA, PROCEDURE, AND POSTOPERATIVE CARE                                  The ophthalmologist or the  anesthesiologist/nurse anesthetist will make your eye numb with either drops or an injection (local anesthesia).  You may also undergo light sedation administered by an anesthesiologist or nurse anesthetist, or elect to have the surgery with only local anesthesia.  There are risks associated with anesthesia and sedation.  These include injury to the eye, heart and breathing problems, and in very rare cases, death.  An incision, or opening, is then made in the eye.  This is at times self-sealing but it may require closure with very fine stitches (sutures) which will gradually dissolve over time or often can be removed in the office. The natural lens in your eye will then be removed.  There are several ways to remove the lens; the most common technique is called phacoemulsification, which uses a vibrating probe to break the lens up into small pieces.  These pieces are gently suctioned out of your eye through a small, hollow tube inserted through a small incision into your eye.  After your natural lens is removed, the IOL is placed inside your eye.  In rare cases, it may not be possible to implant the IOL you have chosen, or any IOL at all.       Your eye will be examined the day after surgery by your surgeon or an eye doctor chosen by your surgeon, and then at intervals determined by your surgeon.  During the immediate recovery period, you will place drops in your eyes for about 2 to 4 weeks, depending on your individual rate of healing. You may have elected to have some drops injected at the time of surgery to reduce the amount of drops needed after surgery. Dr. Huang will discuss this with you.  If you have chosen monovision or a multifocal IOL to reduce your dependency on glasses or contacts, they may still be required either for further improvement in your distance vision, reading vision, or both.  You should be able to resume your normal activities within 3 to 7 days, and your eye will usually be stable within 3  to 6 weeks, at which time glasses or contact lenses could be prescribed.                                     Please feel free to call us at the clinic (phone number will be listed on your paperowork) and I would be happy to clarify or answer any questions.    PLEASE NOTE:Rarely, Dr. Huang will need to perform emergency surgery or he will be out of the office and your appointment will need to be moved. Thank you in advance for your understanding.

## 2018-08-27 ENCOUNTER — OFFICE VISIT (OUTPATIENT)
Dept: CARDIOLOGY | Facility: CLINIC | Age: 83
End: 2018-08-27

## 2018-08-27 VITALS
BODY MASS INDEX: 26.36 KG/M2 | OXYGEN SATURATION: 99 % | HEART RATE: 55 BPM | DIASTOLIC BLOOD PRESSURE: 72 MMHG | WEIGHT: 178 LBS | HEIGHT: 69 IN | SYSTOLIC BLOOD PRESSURE: 136 MMHG

## 2018-08-27 DIAGNOSIS — E78.00 HYPERCHOLESTEROLEMIA: ICD-10-CM

## 2018-08-27 DIAGNOSIS — I38 VALVULAR HEART DISEASE: ICD-10-CM

## 2018-08-27 DIAGNOSIS — I25.10 CORONARY ARTERY DISEASE INVOLVING NATIVE CORONARY ARTERY OF NATIVE HEART WITHOUT ANGINA PECTORIS: Primary | ICD-10-CM

## 2018-08-27 DIAGNOSIS — I10 ESSENTIAL HYPERTENSION: ICD-10-CM

## 2018-08-27 PROCEDURE — 99213 OFFICE O/P EST LOW 20 MIN: CPT | Performed by: INTERNAL MEDICINE

## 2018-09-20 ENCOUNTER — FLU SHOT (OUTPATIENT)
Dept: FAMILY MEDICINE CLINIC | Facility: CLINIC | Age: 83
End: 2018-09-20

## 2018-09-20 PROCEDURE — G0008 ADMIN INFLUENZA VIRUS VAC: HCPCS | Performed by: FAMILY MEDICINE

## 2018-09-20 PROCEDURE — 90662 IIV NO PRSV INCREASED AG IM: CPT | Performed by: FAMILY MEDICINE

## 2018-10-12 NOTE — PROGRESS NOTES
Encounter Date:10/16/2018      Patient ID: Clinton Ramirez is a 83 y.o. male.    Chief Complaint: Coronary Artery Disease      PROBLEM LIST:  1. Coronary Artery Disease  a. Cardiac catheterization (7/11/2018): single-vessel coronary artery disease with 50% stenosis of the mid circumflex with acceptable IFR of 0.96. Normal left ventricular systolic function, LVEF 65%. Mean gradient of 15 mmHg across the aortic valve, consistent with known mild aortic stenosis.   2. Abnormal Stress Test  a. MPS (6/28/2018): Baseline EKG rhythm of sinus bradycardia noted. PACs, PVCs, and RBBB noted. Findings consistent with a normal EKG stress test. Myocardial perfusion imaging indicates a small-to-medium sized, moderately severe area of ischemia located in the apex and inferior wall.   3. Angina Pectoralis  4. Dyspnea on exertion  5. Heart murmur  6. Valvular Heart Disease, mild-moderate AS  a. Echo (5/11/2018): normal left ventricular cavity size and wall thickness noted. All left ventricular wall segments contract normall. Left ventricular diastolic dysfunction (grade I) consistent with impaired relaxation. LVEF 61-65%. Aortic valve is abnormal in structure. Calcification of the aortic valve affects the non, left, and right coronary cusps. Mild AR noted. Mild-moderate aortic valve stenosis is present.   7. Hypercholesterolemia  8. Essential Hypertension  9. RBBB  10. Sinus arrhythmia   11. History of Kidney Disease  12. Bone spur  13. Cervical radiculopathy  14. Elevated prostate specific antigen (PSA)  15. Osteoarthritis  16. Recent skin changes  17. Shoulder joint pain  18. Tinnitus    History of Present Illness  Patient presents today for follow-up with a history of CAD, VHD and cardiac risk factors. Since his last visit, he's been doing well from a cardiovascular standpoint. He reports that yesterday, while he was driving, had sharp pain in his left arm. He reports that he chronically has pain in his right hand that may  be due to his neck arthritisd. He reports that he's had occasional chest pain that occurs while he is resting and sitting. He is unable to replicate the chest pain when he is active and moving. Once day, his wife answered a phone call from the patient, which was placed in his left chest breast pocket. The phone call played a sound that sounded like an irregular heart beat, which concerned the wife. The phone call was recorded via voicemail,which we reviewed where it sounded like mechanical movements, inconsistent with a heart beat sound. His PCP monitors his cholesterol and blood work. Today, he denies chest pain, shortness of breath, leg swelling, palpitations, and syncope.      No Known Allergies      Current Outpatient Prescriptions:   •  aspirin 81 MG chewable tablet, Chew 81 mg Every Morning., Disp: , Rfl:   •  atorvastatin (LIPITOR) 40 MG tablet, Take 1 tablet by mouth Daily., Disp: 90 tablet, Rfl: 3  •  felodipine (PLENDIL) 5 MG 24 hr tablet, Take 1 tablet by mouth Daily. (Patient taking differently: Take 5 mg by mouth Every Morning.), Disp: 90 tablet, Rfl: 3  •  ibuprofen (ADVIL,MOTRIN) 200 MG tablet, Take 200 mg by mouth Every 6 (Six) Hours As Needed for Mild Pain ., Disp: , Rfl:   •  metoprolol succinate XL (TOPROL-XL) 25 MG 24 hr tablet, Take 1 tablet by mouth Daily., Disp: 90 tablet, Rfl: 3  •  sildenafil (VIAGRA) 100 MG tablet, Take 1 tablet by mouth as needed., Disp: , Rfl:     The following portions of the patient's history were reviewed and updated as appropriate: allergies, current medications, past family history, past medical history, past social history, past surgical history and problem list.    ROS  Review of Systems   Constitution: Negative for chills, fever, weight gain and weight loss.   Cardiovascular: Negative for chest pain, claudication, dyspnea on exertion, leg swelling, orthopnea, palpitations, paroxysmal nocturnal dyspnea and syncope.        No dizziness   Gastrointestinal: Negative  "for abdominal pain, constipation, diarrhea, nausea and vomiting.   Genitourinary:        No urinary symptoms   Neurological:        No symptoms of stroke.   All other systems reviewed and are negative.    Objective:     Blood pressure 130/64, pulse 60, height 174 cm (68.5\"), weight 82.6 kg (182 lb).      Physical Exam  Constitutional: She appears well-developed and well-nourished.   HENT:   HEENT exam unremarkable.   Neck: Neck supple. No JVD present.   No carotid bruits.   Cardiovascular: Normal rate, regular rhythm and normal heart sounds.    No murmur heard.  2 plus symmetric pulses.   Pulmonary/Chest: Breath sounds normal. Does not exhibit tenderness.   Abdominal:   Abdomen benign.   Musculoskeletal: Does not exhibit edema.   Neurological:   Neurological exam unremarkable.   Vitals reviewed.    Lab Review:   Procedures       Assessment:      Diagnosis Plan   1. Coronary artery disease involving native coronary artery of native heart without typical angina pectoris  Stable/Controlled with medication   2. Essential hypertension  Stable/Controlled with Metoprolol.   3. Mild to moderate aortic stenosis, asymptomatic     4. Hypercholesterolemia  Maintained by his PCP, continue on statin      Plan:   Stable cardiac status.  Order a 24 hour Holter Monitor after suspicion of irregular heart beats.   Continue current medications.   FU in 6 MO, sooner as needed.  Thank you for allowing us to participate in the care of your patient.      Scribed for Claire Scott MD by Leslie Patel. 10/16/2018  11:20 AM     I, Claire Scott MD, personally performed the services described in this documentation as scribed by the above named individual in my presence, and it is both accurate and complete.  10/16/2018  11:46 AM      Please note that portions of this note may have been completed with a voice recognition program. Efforts were made to edit the dictations, but occasionally words are mistranscribed.    "

## 2018-10-16 ENCOUNTER — OFFICE VISIT (OUTPATIENT)
Dept: CARDIOLOGY | Facility: CLINIC | Age: 83
End: 2018-10-16

## 2018-10-16 VITALS
WEIGHT: 182 LBS | SYSTOLIC BLOOD PRESSURE: 130 MMHG | HEART RATE: 60 BPM | HEIGHT: 69 IN | DIASTOLIC BLOOD PRESSURE: 64 MMHG | BODY MASS INDEX: 26.96 KG/M2

## 2018-10-16 DIAGNOSIS — E78.00 HYPERCHOLESTEROLEMIA: ICD-10-CM

## 2018-10-16 DIAGNOSIS — I25.10 CORONARY ARTERY DISEASE INVOLVING NATIVE CORONARY ARTERY OF NATIVE HEART WITHOUT ANGINA PECTORIS: Primary | ICD-10-CM

## 2018-10-16 DIAGNOSIS — I38 VHD (VALVULAR HEART DISEASE): ICD-10-CM

## 2018-10-16 DIAGNOSIS — I10 ESSENTIAL HYPERTENSION: ICD-10-CM

## 2018-10-16 PROCEDURE — 99214 OFFICE O/P EST MOD 30 MIN: CPT | Performed by: INTERNAL MEDICINE

## 2018-11-05 ENCOUNTER — OFFICE VISIT (OUTPATIENT)
Dept: FAMILY MEDICINE CLINIC | Facility: CLINIC | Age: 83
End: 2018-11-05

## 2018-11-05 VITALS
WEIGHT: 179.2 LBS | TEMPERATURE: 97.5 F | DIASTOLIC BLOOD PRESSURE: 63 MMHG | SYSTOLIC BLOOD PRESSURE: 116 MMHG | HEIGHT: 70 IN | OXYGEN SATURATION: 98 % | BODY MASS INDEX: 25.65 KG/M2 | HEART RATE: 71 BPM

## 2018-11-05 DIAGNOSIS — I10 ESSENTIAL HYPERTENSION: Primary | ICD-10-CM

## 2018-11-05 DIAGNOSIS — I25.10 CORONARY ARTERY DISEASE INVOLVING NATIVE CORONARY ARTERY OF NATIVE HEART WITHOUT ANGINA PECTORIS: ICD-10-CM

## 2018-11-05 PROCEDURE — 99213 OFFICE O/P EST LOW 20 MIN: CPT | Performed by: FAMILY MEDICINE

## 2018-11-05 NOTE — PROGRESS NOTES
"Subjective     Chief Complaint   Patient presents with   • Hypertension       Clinton Ramirez is a 83 y.o. male.     History of Present Illness follow-up regarding hypertension.  Generally having no significant new problems.  Has had cardiology follow-up as an evaluations.  Records are reviewed.  Some medication dosing adjustments.  Denies CP SOB palpitations GI  concerns.  Did have a slight accident about 2 weeks ago latter gave way.  Called self with right shoulder.  Has generally felt okay since then.  Possibly struck head but no LOC.  Has had some mild positional dizziness.    The following portions of the patient's history were reviewed and updated as appropriate: allergies, current medications, past medical history, past social history, past surgical history and problem list.    Review of Systems the the history of present illness    Objective   Physical Exam   Constitutional: He is oriented to person, place, and time. He appears well-developed and well-nourished.   HENT:   Head: Normocephalic.   Mouth/Throat: Oropharynx is clear and moist.   Eyes: Conjunctivae are normal.   Neck: Normal range of motion. Neck supple. No tracheal deviation present. No thyromegaly present.   Cardiovascular: Normal rate, regular rhythm and intact distal pulses.    Murmur heard.  Systolic murmur at base   Pulmonary/Chest: Effort normal and breath sounds normal.   Abdominal: Soft. There is no tenderness.   Musculoskeletal: He exhibits no edema.   Lymphadenopathy:     He has no cervical adenopathy.   Neurological: He is alert and oriented to person, place, and time.   Skin: Skin is warm and dry.   Psychiatric: He has a normal mood and affect.   Vitals reviewed.    /63   Pulse 71   Temp 97.5 °F (36.4 °C)   Ht 177.8 cm (70\")   Wt 81.3 kg (179 lb 3.2 oz)   SpO2 98%   BMI 25.71 kg/m²   Assessment/Plan   Clinton was seen today for hypertension.    Diagnoses and all orders for this visit:    Essential hypertension    Coronary " artery disease involving native coronary artery of native heart without angina pectoris     Continue all medication as reconciled ordered.  It seems you are doing quite well.  Stay safely active.  Maintain heart healthy diet.  Counseled regarding shingles vaccine.  You report having received first hepatitis A vaccine.  Recheck here in 6 months or as needed.  If you do not see resolution of the mild positional dizziness soon please contact us.  Keep follow-up with cardiology.  Patient's Body mass index is 25.71 kg/m². BMI is above normal parameters. Recommendations include: exercise counseling and nutrition counseling.

## 2018-11-26 ENCOUNTER — OFFICE VISIT (OUTPATIENT)
Dept: FAMILY MEDICINE CLINIC | Facility: CLINIC | Age: 83
End: 2018-11-26

## 2018-11-26 VITALS
TEMPERATURE: 96.9 F | HEART RATE: 59 BPM | SYSTOLIC BLOOD PRESSURE: 126 MMHG | WEIGHT: 181.2 LBS | HEIGHT: 70 IN | BODY MASS INDEX: 25.94 KG/M2 | DIASTOLIC BLOOD PRESSURE: 63 MMHG

## 2018-11-26 DIAGNOSIS — M10.9 ACUTE GOUT INVOLVING TOE OF LEFT FOOT, UNSPECIFIED CAUSE: ICD-10-CM

## 2018-11-26 DIAGNOSIS — S99.922A FOOT INJURY, LEFT, INITIAL ENCOUNTER: ICD-10-CM

## 2018-11-26 DIAGNOSIS — M25.579 ARTHRALGIA OF FOOT, UNSPECIFIED LATERALITY: Primary | ICD-10-CM

## 2018-11-26 LAB
BASOPHILS # BLD AUTO: 0.01 10*3/MM3 (ref 0–0.3)
BASOPHILS NFR BLD AUTO: 0.1 % (ref 0–2)
CHROMATIN AB SERPL-ACNC: 10 IU/ML (ref 0–14)
CRP SERPL-MCNC: 2.45 MG/DL (ref 0–0.99)
DEPRECATED RDW RBC AUTO: 41.6 FL (ref 37–54)
EOSINOPHIL # BLD AUTO: 0.12 10*3/MM3 (ref 0–0.7)
EOSINOPHIL NFR BLD AUTO: 1.2 % (ref 0–7)
ERYTHROCYTE [DISTWIDTH] IN BLOOD BY AUTOMATED COUNT: 12.8 % (ref 11.5–14.5)
ERYTHROCYTE [SEDIMENTATION RATE] IN BLOOD: 56 MM/HR (ref 0–20)
HCT VFR BLD AUTO: 43.5 % (ref 42–52)
HGB BLD-MCNC: 14.6 G/DL (ref 14–18)
IMM GRANULOCYTES # BLD: 0.04 10*3/MM3 (ref 0–0.03)
IMM GRANULOCYTES NFR BLD: 0.4 % (ref 0–0.5)
LYMPHOCYTES # BLD AUTO: 1.48 10*3/MM3 (ref 1–3)
LYMPHOCYTES NFR BLD AUTO: 15 % (ref 16–46)
MCH RBC QN AUTO: 31 PG (ref 27–33)
MCHC RBC AUTO-ENTMCNC: 33.6 G/DL (ref 33–37)
MCV RBC AUTO: 92.4 FL (ref 80–94)
MONOCYTES # BLD AUTO: 1.17 10*3/MM3 (ref 0.1–0.9)
MONOCYTES NFR BLD AUTO: 11.9 % (ref 0–12)
NEUTROPHILS # BLD AUTO: 7.04 10*3/MM3 (ref 1.4–6.5)
NEUTROPHILS NFR BLD AUTO: 71.4 % (ref 40–75)
PLATELET # BLD AUTO: 288 10*3/MM3 (ref 130–400)
PMV BLD AUTO: 11.6 FL (ref 6–10)
RBC # BLD AUTO: 4.71 10*6/MM3 (ref 4.7–6.1)
URATE SERPL-MCNC: 6 MG/DL (ref 3.7–7)
WBC NRBC COR # BLD: 9.86 10*3/MM3 (ref 4.5–12.5)

## 2018-11-26 PROCEDURE — 84550 ASSAY OF BLOOD/URIC ACID: CPT | Performed by: NURSE PRACTITIONER

## 2018-11-26 PROCEDURE — 85025 COMPLETE CBC W/AUTO DIFF WBC: CPT | Performed by: NURSE PRACTITIONER

## 2018-11-26 PROCEDURE — 99214 OFFICE O/P EST MOD 30 MIN: CPT | Performed by: NURSE PRACTITIONER

## 2018-11-26 PROCEDURE — 86431 RHEUMATOID FACTOR QUANT: CPT | Performed by: NURSE PRACTITIONER

## 2018-11-26 PROCEDURE — 85652 RBC SED RATE AUTOMATED: CPT | Performed by: NURSE PRACTITIONER

## 2018-11-26 PROCEDURE — 86140 C-REACTIVE PROTEIN: CPT | Performed by: NURSE PRACTITIONER

## 2018-11-26 RX ORDER — PREDNISONE 5 MG/1
1 TABLET ORAL DAILY
Qty: 1 EACH | Refills: 0 | Status: SHIPPED | OUTPATIENT
Start: 2018-11-26 | End: 2019-02-25 | Stop reason: ALTCHOICE

## 2018-11-26 RX ORDER — ALLOPURINOL 100 MG/1
100 TABLET ORAL DAILY
Qty: 30 TABLET | Refills: 1 | Status: SHIPPED | OUTPATIENT
Start: 2018-11-26 | End: 2018-12-10

## 2018-11-26 NOTE — PROGRESS NOTES
Subjective   Clinton Ramirez is a 83 y.o. male.     Patient is presenting today with symptoms of gout exacerbation to left  Foot and great toe.  He is reporting being seen in the ER on 11/17  .  X-rays and labs were completed.  His uric acid was within normal range.  However he was treated for gout at that time with steroids. He does report positive history for gout in the past.  He is also reporting the day before pain started he tripped at home and blunt trauma to the left foot in the metatarsal area  Sustained.  X-ray in ER was negative for fracture.  He still has some tenderness in the metatarsal area with mild edema and faint bruising  .  Also tender to left great toe.    And ibuprofen.Had Xray and ER visit on Saturday 11/17.  Was having extreme pain to his left foot. Was given prednisone pills an injection.  Symptoms had improved however returned this past Saturday  .  He denies any fever or chills.  No redness or warmth or edema to  Great toe.  X-ray did note a bone spur to left heel and some osteoarthritis to the left great toe.  Patient does report episodic pain to the joints of hands, and wrists, fingers.  Feels it is likely arthritis.  He does take ibuprofen as needed for the pain with some moderate relief. He has no other complaints today.         The following portions of the patient's history were reviewed and updated as appropriate: allergies, current medications, past family history, past medical history, past social history, past surgical history and problem list.    Review of Systems   Constitutional: Negative for chills, fatigue and fever.   HENT: Negative for congestion.    Eyes: Negative for discharge and visual disturbance.   Respiratory: Negative for cough.    Cardiovascular: Positive for leg swelling ( left lower extremity with trace edema). Negative for chest pain and palpitations.   Gastrointestinal: Negative for abdominal pain.   Endocrine: Negative.    Genitourinary: Negative for dysuria,  frequency and hematuria.   Musculoskeletal: Positive for arthralgias ( he does report finger or wrist and joint pain in the upper extremities as well.) and joint swelling ( iis reporting some swelling to left great toe). Negative for back pain and myalgias.   Skin: Negative for rash and wound.   Neurological: Negative for dizziness, weakness and headaches.   Hematological: Does not bruise/bleed easily.   Psychiatric/Behavioral: Negative for sleep disturbance. The patient is not nervous/anxious.        Objective   Physical Exam   Constitutional: He appears well-developed. No distress.   HENT:   Head: Normocephalic and atraumatic.   Eyes: Conjunctivae and EOM are normal.   Neck: Neck supple. No JVD present.   Cardiovascular: Normal rate and normal heart sounds.   Pulmonary/Chest: Effort normal and breath sounds normal. No respiratory distress.   Abdominal: Soft. Bowel sounds are normal. He exhibits no distension.   Musculoskeletal: He exhibits no edema.        Left ankle: He exhibits decreased range of motion and swelling. Tenderness.     Vascular Status -  His right foot exhibits normal foot vasculature . His left foot exhibits normal foot vasculature  and no edema.  Skin Integrity  - His left foot skin is intact..     Neurological: He is alert.   Skin: Skin is warm. No rash noted.   Psychiatric: He has a normal mood and affect.       Assessment/Plan   Clinton was seen today for gout.  We will prescribe patient allopurinol 100 mg daily for potential gout flare up.  Will repeat uric acid levels and also sedimentation rate CRP and CBC with rheumatoid factor today due to his report of multiple joint pain and swelling.  Instructed to rest and keep foot elevated as much as possible  To avoid further trauma.  If pain and tenderness to metatarsal area continues discussed that further MRI may be necessary to rule out hairline fracture.  The patient instructed to report any redness or warmth increased swelling red streaks or  severe pain to joint or left foot.Patient will follow up in 2 weeks verbalizes understanding .   Diagnoses and all orders for this visit:    Arthralgia of foot, unspecified laterality  -     Sedimentation Rate  -     Uric Acid  -     Rheumatoid Factor  -     C-reactive Protein  -     CBC & Differential  -     CBC Auto Differential    Foot injury, left, initial encounter    Acute gout involving toe of left foot, unspecified cause    Other orders  -     allopurinol (ZYLOPRIM) 100 MG tablet; Take 1 tablet by mouth Daily.  -     PredniSONE 5 MG (48) tablet therapy pack dosepak; Take 1 tablet by mouth Daily. Take as directed on package instructions.

## 2018-12-10 ENCOUNTER — OFFICE VISIT (OUTPATIENT)
Dept: FAMILY MEDICINE CLINIC | Facility: CLINIC | Age: 83
End: 2018-12-10

## 2018-12-10 VITALS
HEART RATE: 78 BPM | HEIGHT: 70 IN | WEIGHT: 176 LBS | DIASTOLIC BLOOD PRESSURE: 76 MMHG | SYSTOLIC BLOOD PRESSURE: 142 MMHG | BODY MASS INDEX: 25.2 KG/M2 | TEMPERATURE: 97.3 F

## 2018-12-10 DIAGNOSIS — R42 DIZZINESS: Primary | ICD-10-CM

## 2018-12-10 DIAGNOSIS — Z91.81 HISTORY OF FALL: ICD-10-CM

## 2018-12-10 DIAGNOSIS — I49.9 IRREGULAR HEARTBEAT: ICD-10-CM

## 2018-12-10 PROCEDURE — 93005 ELECTROCARDIOGRAM TRACING: CPT | Performed by: NURSE PRACTITIONER

## 2018-12-10 PROCEDURE — 99214 OFFICE O/P EST MOD 30 MIN: CPT | Performed by: NURSE PRACTITIONER

## 2018-12-10 RX ORDER — CETIRIZINE HYDROCHLORIDE 10 MG/1
10 TABLET ORAL DAILY
Qty: 30 TABLET | Refills: 0 | Status: SHIPPED | OUTPATIENT
Start: 2018-12-10 | End: 2019-01-03 | Stop reason: SDUPTHER

## 2018-12-10 NOTE — PROGRESS NOTES
He is hiving dizziness over the last couple of weeks.  Was started on Allopurinol for gout.  His foot is better.  Will stop it.  He

## 2018-12-10 NOTE — PROGRESS NOTES
Subjective   Clinton Ramirez is a 83 y.o. male.     Patient is presenting for follow-up On the left foot pain.  He is reporting foot pain has resolved with no further issues.  He has been taking his allopurinol 100 mg daily.  No more swelling reported or redness to joint.  We discussed recent lab results and normal uric acid levels.  He is also reporting episodes of dizziness that have been occurring since approximately 4 weeks ago  .  He had a fall from a ladder and sustained a bump to the head. Discussed that the dizziness could be a side effect of the allopurinol.  Will stop for now.  He denies any other associated symptoms such as Chest pain, Edema, blurred vision, headache, palpitations.  He is followed by cardiology and was evaluated in October.  EKG performed in office today and reviewed by me and compared to last EKG, no additional changes.  He is concerned regarding his blood pressure causing the dizziness.  His blood pressure today is 142/76.   He is taking his metoprolol 25 mg daily.No other complaints voiced today.         The following portions of the patient's history were reviewed and updated as appropriate: allergies, current medications, past family history, past medical history, past social history, past surgical history and problem list.    Review of Systems   Constitutional: Negative for appetite change, chills, fatigue and fever.   HENT: Negative for congestion, sore throat, trouble swallowing and voice change.    Eyes: Negative for discharge, itching and visual disturbance.   Respiratory: Negative for cough, choking, chest tightness and shortness of breath.    Cardiovascular: Negative for chest pain, palpitations and leg swelling.   Gastrointestinal: Negative for abdominal pain, blood in stool, constipation, diarrhea and nausea.   Endocrine: Negative.    Genitourinary: Negative for dysuria, frequency and hematuria.   Musculoskeletal: Negative for arthralgias, back pain and myalgias.   Skin:  Negative for rash and wound.   Allergic/Immunologic: Positive for environmental allergies.   Neurological: Positive for dizziness. Negative for weakness and headaches.   Hematological: Does not bruise/bleed easily.   Psychiatric/Behavioral: Negative for sleep disturbance. The patient is not nervous/anxious.        Objective   Physical Exam   Constitutional: He is oriented to person, place, and time. He appears well-developed. No distress.   HENT:   Head: Normocephalic and atraumatic.   Right Ear: Tympanic membrane is bulging.   Left Ear: Tympanic membrane normal.   Eyes: Conjunctivae, EOM and lids are normal. Pupils are equal, round, and reactive to light.   Neck: Neck supple. No JVD present.   Cardiovascular: Normal rate. An irregular rhythm present.   Murmur heard.  Pulmonary/Chest: Effort normal and breath sounds normal. No respiratory distress.   Abdominal: Soft. Bowel sounds are normal. He exhibits no distension.   Musculoskeletal: Normal range of motion. He exhibits no edema.   Neurological: He is alert and oriented to person, place, and time. No cranial nerve deficit. Coordination and gait normal.   Skin: Skin is warm. No rash noted.   Psychiatric: He has a normal mood and affect.       Assessment/Plan   Clinton was seen today for dizziness and follow-up.  EKG performed in office today due to irregular palpation of heart rate and also audible murmur  .  Patient reports murmur has been present most of his life.  He is followed by cardiology .  EKG reviewed with no significant changes since last EKG performed per cardiology  .  See scanned in records.  No audible bruits.  Normal neuro exam.  Will stop allopurinol .  Will start him on cetirizine 10 mg by mouth daily for next 2-4 weeks due to fluid behind tympanic membrane mostly noted to right.  He will follow-up In 2 weeks to evaluate continued dizziness.  Instructed to report any increased dizziness, confusion, headache, blurred vision or other neurological  symptoms to PCP or ER ASAP.  Discussed that further episodes of dizziness may require CT scan of head.  Discussed his blood pressure results from today and he will continue his metoprolol 25 mg by mouth daily. He verbalizes understanding.    Diagnoses and all orders for this visit:    Dizziness  -     ECG 12 Lead    History of fall    Irregular heartbeat    Other orders  -     cetirizine (zyrTEC) 10 MG tablet; Take 1 tablet by mouth Daily.

## 2018-12-26 ENCOUNTER — OFFICE VISIT (OUTPATIENT)
Dept: FAMILY MEDICINE CLINIC | Facility: CLINIC | Age: 83
End: 2018-12-26

## 2018-12-26 VITALS
TEMPERATURE: 97.6 F | HEIGHT: 70 IN | DIASTOLIC BLOOD PRESSURE: 79 MMHG | WEIGHT: 179.6 LBS | BODY MASS INDEX: 25.71 KG/M2 | SYSTOLIC BLOOD PRESSURE: 132 MMHG | HEART RATE: 64 BPM

## 2018-12-26 DIAGNOSIS — H81.10 BENIGN PAROXYSMAL POSITIONAL VERTIGO, UNSPECIFIED LATERALITY: ICD-10-CM

## 2018-12-26 DIAGNOSIS — E78.49 OTHER HYPERLIPIDEMIA: ICD-10-CM

## 2018-12-26 DIAGNOSIS — R42 DIZZINESS: Primary | ICD-10-CM

## 2018-12-26 DIAGNOSIS — R73.03 PREDIABETES: ICD-10-CM

## 2018-12-26 PROCEDURE — 99214 OFFICE O/P EST MOD 30 MIN: CPT | Performed by: NURSE PRACTITIONER

## 2018-12-26 NOTE — PROGRESS NOTES
Subjective   Clinton Ramirez is a 83 y.o. male.     Patient is presenting today for follow-up on dizziness and the gout pain and arthritis pain to his left foot.  Patient is reporting that his left foot pain has completely resolved.  No longer taking allopurinol.  We will continue to hold.  He is feeling better today.  He is reporting his Dizziness is improved.  Less often, but still having when he changes positions to fast, however not on a daily basis.  He has been taking his tears and 10 mg daily as well. Discussed with him possible treatment for BPPV, however he is refusing today.   Would like to wait and evaluated his symptoms improve on their own. Has been checking his blood pressures at home and reports parameters are within normal range..  Has been doing well.  Denies any chest pain, palpitations, headache or other cardiopulmonary issues.           The following portions of the patient's history were reviewed and updated as appropriate: allergies, current medications, past family history, past medical history, past social history, past surgical history and problem list.    Review of Systems   Constitutional: Negative.  Negative for chills and fever.   HENT: Negative.    Eyes: Negative.    Respiratory: Negative.    Cardiovascular: Positive for leg swelling (left ankle).   Gastrointestinal: Negative.    Endocrine: Negative.    Genitourinary: Negative.    Musculoskeletal: Negative for arthralgias.   Skin: Negative.    Allergic/Immunologic: Positive for environmental allergies.   Neurological: Positive for dizziness and light-headedness. Negative for headaches.   Hematological: Negative.    Psychiatric/Behavioral: Negative.        Objective   Physical Exam   Constitutional: He appears well-developed. No distress.   HENT:   Head: Normocephalic and atraumatic.   Right Ear: Tympanic membrane is bulging.   Eyes: Conjunctivae and EOM are normal.   Neck: Neck supple. No JVD present.   Cardiovascular: Normal rate.    Murmur heard.  Trace left ankle edema     Pulmonary/Chest: Effort normal and breath sounds normal. No respiratory distress.   Abdominal: Soft. Bowel sounds are normal. He exhibits no distension.   Musculoskeletal: Normal range of motion. He exhibits no edema.   Neurological: He is alert.   Skin: Skin is warm. No rash noted.   Psychiatric: He has a normal mood and affect.       Assessment/Plan   Clinton was seen today for dizziness and arthralgia of foot, unspecified laterality.    We will continue his cetirizine 10 mg by mouth daily.  Discussed possible physical therapy treatment for the BPPV.  He refuses at this time.  Would like to wait and see if symptoms resolve on their own with current allergy medications.  Instructed on importance of reporting any increased symptoms, headache, palpitations or altered mental status changes ASAP.  Change positions slowly to avoid fall or injury. Continue to monitor blood pressures and report any abnormal parameters ASAP.  Continue current medications with no changes at this time.  Patient verbalizes understanding.  Will return for fasting labs prior to his follow-up visit scheduled with Dr. Navarro in May.  Will return sooner if needed based on symptoms.      Diagnoses and all orders for this visit:    Dizziness  -     Hemoglobin A1c; Future  -     CBC & Differential; Future  -     Comprehensive Metabolic Panel; Future  -     Lipid Panel; Future    Benign paroxysmal positional vertigo, unspecified laterality    Prediabetes  -     Hemoglobin A1c; Future  -     CBC & Differential; Future  -     Comprehensive Metabolic Panel; Future  -     Lipid Panel; Future    Other hyperlipidemia  -     Hemoglobin A1c; Future  -     CBC & Differential; Future  -     Comprehensive Metabolic Panel; Future  -     Lipid Panel; Future        .

## 2019-01-03 NOTE — TELEPHONE ENCOUNTER
Pt calls, states thinks zyrtec is helping with dizziness and would like a refill to be sent to [facility]

## 2019-01-04 RX ORDER — CETIRIZINE HYDROCHLORIDE 10 MG/1
10 TABLET ORAL DAILY
Qty: 30 TABLET | Refills: 0 | Status: SHIPPED | OUTPATIENT
Start: 2019-01-04 | End: 2019-05-06

## 2019-02-25 ENCOUNTER — OFFICE VISIT (OUTPATIENT)
Dept: CARDIOLOGY | Facility: CLINIC | Age: 84
End: 2019-02-25

## 2019-02-25 VITALS
SYSTOLIC BLOOD PRESSURE: 136 MMHG | WEIGHT: 183 LBS | HEIGHT: 70 IN | DIASTOLIC BLOOD PRESSURE: 78 MMHG | BODY MASS INDEX: 26.2 KG/M2 | OXYGEN SATURATION: 98 % | HEART RATE: 74 BPM

## 2019-02-25 DIAGNOSIS — I10 ESSENTIAL HYPERTENSION: ICD-10-CM

## 2019-02-25 DIAGNOSIS — I25.10 CORONARY ARTERY DISEASE INVOLVING NATIVE CORONARY ARTERY OF NATIVE HEART WITHOUT ANGINA PECTORIS: Primary | ICD-10-CM

## 2019-02-25 DIAGNOSIS — I38 VALVULAR HEART DISEASE: ICD-10-CM

## 2019-02-25 DIAGNOSIS — E78.00 HYPERCHOLESTEROLEMIA: ICD-10-CM

## 2019-02-25 PROCEDURE — 99213 OFFICE O/P EST LOW 20 MIN: CPT | Performed by: INTERNAL MEDICINE

## 2019-02-25 NOTE — PROGRESS NOTES
subjective     Chief Complaint   Patient presents with   • Coronary Artery Disease   • Follow-up     History of Present Illness    Patient is 83 years old elderly gentleman who looks much younger than his stated age.  Patient is here for 6 months cardiology follow-up.  Line patient has known coronary artery disease.  Patient was found to have single-vessel mid circumflex disease.  He had 50% stenosis with acceptable eye 4 of 0.96.  He also has mild aortic stenosis.    Medical management was recommended.    Patient is doing very well he denies any chest pain palpitations or shortness of breath.  He is taking antiplatelet therapy with aspirin.  He is taking beta blocker therapy with the Toprol and statin therapy with Lipitor.  Blood pressure is controlled with Plendil and Toprol as mentioned.  Overall no new complaints patient is doing very well  Past Surgical History:   Procedure Laterality Date   • BIOPSY PROSTATE NEEDLE / PUNCH / INCISIONAL     • CARDIAC CATHETERIZATION N/A 7/11/2018    Procedure: Left Heart Cath;  Surgeon: Claire Scott MD;  Location: [facility] CATH INVASIVE LOCATION;  Service: Cardiovascular   • COLONOSCOPY     • EAR BIOPSY      basal cell skin cancer   • SKIN CANCER EXCISION      melanoma from neck      Family History   Problem Relation Age of Onset   • Cancer Sister    • Tuberculosis Other    • Diabetes Other    • Heart disease Father      Past Medical History:   Diagnosis Date   • Arthritis     hands   • Elevated cholesterol    • Hypertension    • Hypokalemia    • Multiple benign polyps of large intestine    • Need for prophylactic vaccination and inoculation against influenza    • Pancreatitis    • Paralytic ileus (CMS/HCC)    • PPD positive     negative chest xray    • Renal insufficiency     mild-told to stop Naproxen   • Rheumatic fever     age 20 while in -hospitalized    • Skin cancer     basal cell from ears, melanoma from neck area    • Valvular disease    • Wears glasses    •  Wears partial dentures     upper      Patient Active Problem List   Diagnosis   • Bone spur   • Cervical radiculopathy   • Elevated prostate specific antigen (PSA)   • Hypercholesterolemia   • Essential hypertension   • Impaired fasting glucose   • Osteoarthritis   • Recent skin changes   • Shoulder joint pain   • Tinnitus   • Heart murmur   • Valvular heart disease, mild to moderate AS   • Angina pectoris (CMS/HCC)   • Dyspnea on exertion   • Right bundle branch block   • Sinus arrhythmia   • Abnormal stress test   • History of kidney disease   • Coronary artery disease       Social History     Tobacco Use   • Smoking status: Former Smoker     Packs/day: 3.00     Years: 11.00     Pack years: 33.00     Last attempt to quit:      Years since quittin.1   • Smokeless tobacco: Never Used   Substance Use Topics   • Alcohol use: Yes     Alcohol/week: 4.2 oz     Types: 7 Cans of beer per week     Comment: 1 beer daily sometimes 2    • Drug use: No       No Known Allergies    Current Outpatient Medications on File Prior to Visit   Medication Sig   • aspirin 81 MG chewable tablet Chew 81 mg Every Morning.   • atorvastatin (LIPITOR) 40 MG tablet Take 1 tablet by mouth Daily.   • cetirizine (zyrTEC) 10 MG tablet Take 1 tablet by mouth Daily.   • felodipine (PLENDIL) 5 MG 24 hr tablet Take 1 tablet by mouth Daily. (Patient taking differently: Take 5 mg by mouth Every Morning.)   • ibuprofen (ADVIL,MOTRIN) 200 MG tablet Take 200 mg by mouth Every 6 (Six) Hours As Needed for Mild Pain .   • metoprolol succinate XL (TOPROL-XL) 25 MG 24 hr tablet Take 1 tablet by mouth Daily.   • sildenafil (VIAGRA) 100 MG tablet Take 1 tablet by mouth as needed.   • [DISCONTINUED] PredniSONE 5 MG (48) tablet therapy pack dosepak Take 1 tablet by mouth Daily. Take as directed on package instructions.     No current facility-administered medications on file prior to visit.          The following portions of the patient's history were  "reviewed and updated as appropriate: allergies, current medications, past family history, past medical history, past social history, past surgical history and problem list.    Review of Systems   Constitution: Negative.   HENT: Negative.  Negative for congestion.    Eyes: Negative.    Cardiovascular: Negative.  Negative for chest pain, cyanosis, dyspnea on exertion, irregular heartbeat, leg swelling, near-syncope, orthopnea, palpitations, paroxysmal nocturnal dyspnea and syncope.   Respiratory: Negative.  Negative for shortness of breath.    Hematologic/Lymphatic: Negative.    Musculoskeletal: Negative.    Gastrointestinal: Negative.    Neurological: Negative.  Negative for headaches.          Objective:     /78 (BP Location: Left arm, Patient Position: Sitting)   Pulse 74   Ht 177.8 cm (70\")   Wt 83 kg (183 lb)   SpO2 98%   BMI 26.26 kg/m²   Physical Exam   Constitutional: He appears well-developed and well-nourished. No distress.   HENT:   Head: Normocephalic and atraumatic.   Mouth/Throat: Oropharynx is clear and moist. No oropharyngeal exudate.   Eyes: Conjunctivae and EOM are normal. Pupils are equal, round, and reactive to light. No scleral icterus.   Neck: Normal range of motion. Neck supple. No JVD present. No tracheal deviation present. No thyromegaly present.   Cardiovascular: Normal rate, regular rhythm, normal heart sounds and intact distal pulses. PMI is not displaced. Exam reveals no gallop, no friction rub and no decreased pulses.   No murmur heard.  Pulses:       Carotid pulses are 3+ on the right side, and 3+ on the left side.       Radial pulses are 3+ on the right side, and 3+ on the left side.   Pulmonary/Chest: Effort normal and breath sounds normal. No respiratory distress. He has no wheezes. He has no rales. He exhibits no tenderness.   Abdominal: Soft. Bowel sounds are normal. He exhibits no distension, no abdominal bruit and no mass. There is no splenomegaly or hepatomegaly. There " is no tenderness. There is no rebound and no guarding.   Musculoskeletal: Normal range of motion. He exhibits no edema, tenderness or deformity.   Lymphadenopathy:     He has no cervical adenopathy.   Neurological: He is alert. He has normal reflexes. No cranial nerve deficit. He exhibits normal muscle tone. Coordination normal.   Skin: Skin is warm and dry. No rash noted. He is not diaphoretic. No erythema.   Psychiatric: He has a normal mood and affect. His behavior is normal. Judgment and thought content normal.         Lab Review  Lab Results   Component Value Date     07/10/2018    K 3.6 07/10/2018     07/10/2018    BUN 15 07/10/2018    CREATININE 1.21 07/10/2018    GLUCOSE 180 (H) 07/10/2018    CALCIUM 8.5 (L) 07/10/2018    ALT 16 07/10/2018    ALKPHOS 72 07/10/2018    LABIL2 1.3 (L) 05/12/2016     No results found for: CKTOTAL  Lab Results   Component Value Date    WBC 9.86 11/26/2018    HGB 14.6 11/26/2018    HCT 43.5 11/26/2018     11/26/2018     No results found for: INR  No results found for: MG  Lab Results   Component Value Date    TSH 3.773 11/08/2017     No results found for: BNP  Lab Results   Component Value Date    CHLPL 166 05/12/2016    CHOL 148 07/11/2018    TRIG 106 07/11/2018    HDL 39 (L) 07/11/2018    VLDL 28.8 05/08/2018    LDLHDL 2.18 05/08/2018     Lab Results   Component Value Date    LDL 94 07/11/2018    LDL 89 05/08/2018       Procedures       I personally viewed and interpreted the patient's LAB data         Assessment:     1. Coronary artery disease involving native coronary artery of native heart without angina pectoris    2. Essential hypertension    3. Hypercholesterolemia    4. Valvular heart disease, mild to moderate AS          Plan:      Patient has known coronary artery disease with  50% mid circumflex lesion and patient is doing very well and is completely asymptomatic.  Patient is physically active and denies any chest pain.  He was advised to continue  antiplatelet therapy with aspirin.  He will continue Lipitor therapy  He has not had lab work in quite some time.  Lab work was requested.  An  He will continue beta blocker therapy with Toprol.  Blood pressure is controlled with the Plendil and Toprol which was continued.  Follow-up in 6 months but he will have lab work done as soon as possible.    Return in about 6 months (around 8/25/2019).

## 2019-03-07 ENCOUNTER — LAB (OUTPATIENT)
Dept: FAMILY MEDICINE CLINIC | Facility: CLINIC | Age: 84
End: 2019-03-07

## 2019-03-07 DIAGNOSIS — I25.10 CORONARY ARTERY DISEASE INVOLVING NATIVE CORONARY ARTERY OF NATIVE HEART WITHOUT ANGINA PECTORIS: ICD-10-CM

## 2019-03-07 DIAGNOSIS — E78.00 HYPERCHOLESTEROLEMIA: ICD-10-CM

## 2019-03-07 DIAGNOSIS — R42 DIZZINESS: ICD-10-CM

## 2019-03-07 DIAGNOSIS — R73.03 PREDIABETES: ICD-10-CM

## 2019-03-07 DIAGNOSIS — E78.49 OTHER HYPERLIPIDEMIA: ICD-10-CM

## 2019-03-07 LAB
ALBUMIN SERPL-MCNC: 4.1 G/DL (ref 3.4–4.8)
ALBUMIN/GLOB SERPL: 1.2 G/DL (ref 1.5–2.5)
ALP SERPL-CCNC: 95 U/L (ref 40–129)
ALT SERPL W P-5'-P-CCNC: 22 U/L (ref 10–44)
ANION GAP SERPL CALCULATED.3IONS-SCNC: 9 MMOL/L (ref 3.6–11.2)
AST SERPL-CCNC: 22 U/L (ref 10–34)
BASOPHILS # BLD AUTO: 0.03 10*3/MM3 (ref 0–0.3)
BASOPHILS NFR BLD AUTO: 0.3 % (ref 0–2)
BILIRUB SERPL-MCNC: 0.9 MG/DL (ref 0.2–1.8)
BUN BLD-MCNC: 18 MG/DL (ref 7–21)
BUN/CREAT SERPL: 13.8 (ref 7–25)
CALCIUM SPEC-SCNC: 9.4 MG/DL (ref 7.7–10)
CHLORIDE SERPL-SCNC: 104 MMOL/L (ref 99–112)
CHOLEST SERPL-MCNC: 134 MG/DL (ref 0–200)
CK SERPL-CCNC: 43 U/L (ref 24–204)
CO2 SERPL-SCNC: 28 MMOL/L (ref 24.3–31.9)
CREAT BLD-MCNC: 1.3 MG/DL (ref 0.43–1.29)
DEPRECATED RDW RBC AUTO: 41.6 FL (ref 37–54)
EOSINOPHIL # BLD AUTO: 0.18 10*3/MM3 (ref 0–0.7)
EOSINOPHIL NFR BLD AUTO: 1.8 % (ref 0–7)
ERYTHROCYTE [DISTWIDTH] IN BLOOD BY AUTOMATED COUNT: 12.7 % (ref 11.5–14.5)
GFR SERPL CREATININE-BSD FRML MDRD: 53 ML/MIN/1.73
GLOBULIN UR ELPH-MCNC: 3.3 GM/DL
GLUCOSE BLD-MCNC: 126 MG/DL (ref 70–110)
HBA1C MFR BLD: 5.5 % (ref 4.5–5.7)
HCT VFR BLD AUTO: 40.8 % (ref 42–52)
HDLC SERPL-MCNC: 45 MG/DL (ref 60–100)
HGB BLD-MCNC: 13.9 G/DL (ref 14–18)
IMM GRANULOCYTES # BLD AUTO: 0.04 10*3/MM3 (ref 0–0.03)
IMM GRANULOCYTES NFR BLD AUTO: 0.4 % (ref 0–0.5)
LDLC SERPL CALC-MCNC: 68 MG/DL (ref 0–100)
LDLC/HDLC SERPL: 1.51 {RATIO}
LYMPHOCYTES # BLD AUTO: 2.2 10*3/MM3 (ref 1–3)
LYMPHOCYTES NFR BLD AUTO: 22 % (ref 16–46)
MCH RBC QN AUTO: 31.1 PG (ref 27–33)
MCHC RBC AUTO-ENTMCNC: 34.1 G/DL (ref 33–37)
MCV RBC AUTO: 91.3 FL (ref 80–94)
MONOCYTES # BLD AUTO: 1.04 10*3/MM3 (ref 0.1–0.9)
MONOCYTES NFR BLD AUTO: 10.4 % (ref 0–12)
NEUTROPHILS # BLD AUTO: 6.5 10*3/MM3 (ref 1.4–6.5)
NEUTROPHILS NFR BLD AUTO: 65.1 % (ref 40–75)
OSMOLALITY SERPL CALC.SUM OF ELEC: 284.7 MOSM/KG (ref 273–305)
PLATELET # BLD AUTO: 250 10*3/MM3 (ref 130–400)
PMV BLD AUTO: 11.9 FL (ref 6–10)
POTASSIUM BLD-SCNC: 4.3 MMOL/L (ref 3.5–5.3)
PROT SERPL-MCNC: 7.4 G/DL (ref 6–8)
RBC # BLD AUTO: 4.47 10*6/MM3 (ref 4.7–6.1)
SODIUM BLD-SCNC: 141 MMOL/L (ref 135–153)
TRIGL SERPL-MCNC: 106 MG/DL (ref 0–150)
VLDLC SERPL-MCNC: 21.2 MG/DL
WBC NRBC COR # BLD: 9.99 10*3/MM3 (ref 4.5–12.5)

## 2019-03-07 PROCEDURE — 83036 HEMOGLOBIN GLYCOSYLATED A1C: CPT | Performed by: NURSE PRACTITIONER

## 2019-03-07 PROCEDURE — 82550 ASSAY OF CK (CPK): CPT | Performed by: NURSE PRACTITIONER

## 2019-03-07 PROCEDURE — 85025 COMPLETE CBC W/AUTO DIFF WBC: CPT | Performed by: NURSE PRACTITIONER

## 2019-03-07 PROCEDURE — 80053 COMPREHEN METABOLIC PANEL: CPT | Performed by: NURSE PRACTITIONER

## 2019-03-07 PROCEDURE — 80061 LIPID PANEL: CPT | Performed by: NURSE PRACTITIONER

## 2019-03-07 PROCEDURE — 36415 COLL VENOUS BLD VENIPUNCTURE: CPT | Performed by: NURSE PRACTITIONER

## 2019-03-12 ENCOUNTER — TELEPHONE (OUTPATIENT)
Dept: FAMILY MEDICINE CLINIC | Facility: CLINIC | Age: 84
End: 2019-03-12

## 2019-03-12 DIAGNOSIS — I10 ESSENTIAL HYPERTENSION: ICD-10-CM

## 2019-03-12 RX ORDER — FELODIPINE 5 MG/1
5 TABLET, EXTENDED RELEASE ORAL DAILY
Qty: 90 TABLET | Refills: 3 | Status: SHIPPED | OUTPATIENT
Start: 2019-03-12 | End: 2019-12-11 | Stop reason: SDUPTHER

## 2019-05-06 ENCOUNTER — OFFICE VISIT (OUTPATIENT)
Dept: FAMILY MEDICINE CLINIC | Facility: CLINIC | Age: 84
End: 2019-05-06

## 2019-05-06 VITALS
TEMPERATURE: 97.4 F | OXYGEN SATURATION: 98 % | HEART RATE: 57 BPM | SYSTOLIC BLOOD PRESSURE: 122 MMHG | BODY MASS INDEX: 25.81 KG/M2 | HEIGHT: 70 IN | DIASTOLIC BLOOD PRESSURE: 78 MMHG | WEIGHT: 180.3 LBS

## 2019-05-06 DIAGNOSIS — I38 VALVULAR HEART DISEASE: Primary | ICD-10-CM

## 2019-05-06 DIAGNOSIS — I10 ESSENTIAL HYPERTENSION: ICD-10-CM

## 2019-05-06 PROCEDURE — 99213 OFFICE O/P EST LOW 20 MIN: CPT | Performed by: FAMILY MEDICINE

## 2019-05-06 NOTE — PROGRESS NOTES
Subjective   Clinton Ramirez is a 84 y.o. male.     History of Present Illness follow-up hypertension CAD.  Generally no new problems.  Maintains follow-up with cardiology.  Compliant with medications.  Trying to be active maintain regular healthy diet.  Denies dizziness shortness of breath chest pain palpitations.  Denies GI  changes.  No new orthopedic concerns.    The following portions of the patient's history were reviewed and updated as appropriate: allergies, current medications, past medical history, past social history, past surgical history and problem list.    Review of Systems  See history of Present Illness     Objective     Physical Exam   Constitutional: He is oriented to person, place, and time. He appears well-developed and well-nourished.   HENT:   Head: Normocephalic.   Right Ear: External ear normal.   Left Ear: External ear normal.   Mouth/Throat: Oropharynx is clear and moist.   Eyes: Conjunctivae are normal.   Neck: Normal range of motion. Neck supple. No tracheal deviation present. No thyromegaly present.   Cardiovascular: Normal rate, regular rhythm and intact distal pulses.   Systolic murmur at base   Pulmonary/Chest: Effort normal and breath sounds normal.   Musculoskeletal: He exhibits no edema.   Neurological: He is alert and oriented to person, place, and time.   Skin: Skin is warm and dry.   Psychiatric: He has a normal mood and affect.   Vitals reviewed.      PHQ-9 Total Score: 0    Patient's There is no height or weight on file to calculate BMI. BMI is above normal parameters. Recommendations include: exercise counseling and nutrition counseling.   (Normal BMI:  18.5-24.9, OW 25-29.9, Obesity 30 or greater)      Assessment/Plan     Clinton was seen today for hypertension.    Diagnoses and all orders for this visit:    Valvular heart disease, mild to moderate AS    Essential hypertension    Globally things appear to be very stable.  We will continue all medications as reconciled  ordered.  Stay safely active.  Maintain heart healthy diet.  Recheck in about 4 months or as needed.  Recent metabolic parameters were reviewed.  Keep follow-up with cardiology.                     This document has been electronically signed by Nolberto Navarro MD   May 6, 2019 11:12 AM

## 2019-06-10 RX ORDER — METOPROLOL SUCCINATE 25 MG/1
25 TABLET, EXTENDED RELEASE ORAL DAILY
Qty: 90 TABLET | Refills: 3 | Status: SHIPPED | OUTPATIENT
Start: 2019-06-10 | End: 2020-03-12

## 2019-06-10 RX ORDER — ATORVASTATIN CALCIUM 40 MG/1
40 TABLET, FILM COATED ORAL DAILY
Qty: 90 TABLET | Refills: 3 | Status: SHIPPED | OUTPATIENT
Start: 2019-06-10 | End: 2020-07-09 | Stop reason: SDUPTHER

## 2019-06-10 NOTE — TELEPHONE ENCOUNTER
LAST FILLED     METOPROLOL 7-11-18 #90 REFILL X 3    ATORVASTATIN  7--11-90 # 90 REFILL X 3    LAST APPOINTMENT: 5-6-19  NEXT APPOINTMENT: 9-12-19

## 2019-08-26 ENCOUNTER — OFFICE VISIT (OUTPATIENT)
Dept: CARDIOLOGY | Facility: CLINIC | Age: 84
End: 2019-08-26

## 2019-08-26 VITALS
WEIGHT: 177 LBS | HEIGHT: 70 IN | SYSTOLIC BLOOD PRESSURE: 126 MMHG | HEART RATE: 53 BPM | DIASTOLIC BLOOD PRESSURE: 62 MMHG | BODY MASS INDEX: 25.34 KG/M2 | OXYGEN SATURATION: 97 %

## 2019-08-26 DIAGNOSIS — I10 ESSENTIAL HYPERTENSION: ICD-10-CM

## 2019-08-26 DIAGNOSIS — I38 VALVULAR HEART DISEASE: ICD-10-CM

## 2019-08-26 DIAGNOSIS — E78.00 HYPERCHOLESTEROLEMIA: ICD-10-CM

## 2019-08-26 DIAGNOSIS — I25.10 CORONARY ARTERY DISEASE INVOLVING NATIVE CORONARY ARTERY OF NATIVE HEART WITHOUT ANGINA PECTORIS: Primary | ICD-10-CM

## 2019-08-26 PROCEDURE — 99213 OFFICE O/P EST LOW 20 MIN: CPT | Performed by: INTERNAL MEDICINE

## 2019-08-26 NOTE — PROGRESS NOTES
subjective     Chief Complaint   Patient presents with   • Coronary Artery Disease   • Follow-up     patient states he is doing very well     History of Present Illness  Patient is 84 years old gentleman who is here for cardiology follow-up.  Patient states that he is doing very well and has no specific complaints.  Patient has hyperlipidemia and is taking Lipitor 40 mg daily under directions of Dr. Navarro.  He is also taking Plendil 5 mg daily and Toprol-XL 25 mg daily.  He denies any chest pain palpitations or shortness of breath.  Line is also taking antiplatelet therapy with baby aspirin.  Patient has mild to moderate aortic stenosis but has no dizziness syncope presyncope chest pain or palpitations.  Will check echocardiogram next visit.    Past Surgical History:   Procedure Laterality Date   • BIOPSY PROSTATE NEEDLE / PUNCH / INCISIONAL     • CARDIAC CATHETERIZATION N/A 7/11/2018    Procedure: Left Heart Cath;  Surgeon: Claire Scott MD;  Location: [facility] CATH INVASIVE LOCATION;  Service: Cardiovascular   • COLONOSCOPY     • EAR BIOPSY      basal cell skin cancer   • SKIN CANCER EXCISION      melanoma from neck      Family History   Problem Relation Age of Onset   • Cancer Sister    • Tuberculosis Other    • Diabetes Other    • Heart disease Father      Past Medical History:   Diagnosis Date   • Arthritis     hands   • Elevated cholesterol    • Hypertension    • Hypokalemia    • Multiple benign polyps of large intestine    • Need for prophylactic vaccination and inoculation against influenza    • Pancreatitis    • Paralytic ileus (CMS/HCC)    • PPD positive     negative chest xray    • Renal insufficiency     mild-told to stop Naproxen   • Rheumatic fever     age 20 while in -hospitalized    • Skin cancer     basal cell from ears, melanoma from neck area    • Valvular disease    • Wears glasses    • Wears partial dentures     upper      Patient Active Problem List   Diagnosis   • Bone spur   •  Cervical radiculopathy   • Elevated prostate specific antigen (PSA)   • Hypercholesterolemia   • Essential hypertension   • Impaired fasting glucose   • Osteoarthritis   • Recent skin changes   • Shoulder joint pain   • Tinnitus   • Heart murmur   • Valvular heart disease, mild to moderate AS   • Angina pectoris (CMS/HCC)   • Dyspnea on exertion   • Right bundle branch block   • Sinus arrhythmia   • Abnormal stress test   • History of kidney disease   • Coronary artery disease, 50% mid circumflex lesion and nonobstructive PDA lesion       Social History     Tobacco Use   • Smoking status: Former Smoker     Packs/day: 3.00     Years: 11.00     Pack years: 33.00     Last attempt to quit:      Years since quittin.6   • Smokeless tobacco: Never Used   Substance Use Topics   • Alcohol use: Yes     Alcohol/week: 4.2 oz     Types: 7 Cans of beer per week     Comment: 1 beer daily sometimes 2    • Drug use: No       No Known Allergies    Current Outpatient Medications on File Prior to Visit   Medication Sig   • aspirin 81 MG chewable tablet Chew 81 mg Every Morning.   • atorvastatin (LIPITOR) 40 MG tablet Take 1 tablet by mouth Daily.   • felodipine (PLENDIL) 5 MG 24 hr tablet Take 1 tablet by mouth Daily.   • ibuprofen (ADVIL,MOTRIN) 200 MG tablet Take 200 mg by mouth Every 6 (Six) Hours As Needed for Mild Pain .   • metoprolol succinate XL (TOPROL-XL) 25 MG 24 hr tablet Take 1 tablet by mouth Daily.     No current facility-administered medications on file prior to visit.          The following portions of the patient's history were reviewed and updated as appropriate: allergies, current medications, past family history, past medical history, past social history, past surgical history and problem list.    Review of Systems   Constitution: Negative.   HENT: Negative.  Negative for congestion.    Eyes: Negative.    Cardiovascular: Negative.  Negative for chest pain, cyanosis, dyspnea on exertion, irregular  "heartbeat, leg swelling, near-syncope, orthopnea, palpitations, paroxysmal nocturnal dyspnea and syncope.   Respiratory: Negative.  Negative for shortness of breath.    Hematologic/Lymphatic: Negative.    Musculoskeletal: Negative.    Gastrointestinal: Negative.    Neurological: Negative.  Negative for headaches.          Objective:     /62 (BP Location: Left arm, Patient Position: Sitting, Cuff Size: Adult)   Pulse 53   Ht 177.8 cm (70\")   Wt 80.3 kg (177 lb)   SpO2 97%   BMI 25.40 kg/m²   Physical Exam   Constitutional: He appears well-developed and well-nourished. No distress.   HENT:   Head: Normocephalic and atraumatic.   Mouth/Throat: Oropharynx is clear and moist. No oropharyngeal exudate.   Eyes: Conjunctivae and EOM are normal. Pupils are equal, round, and reactive to light. No scleral icterus.   Neck: Normal range of motion. Neck supple. No JVD present. No tracheal deviation present. No thyromegaly present.   Cardiovascular: Normal rate, regular rhythm and intact distal pulses. PMI is not displaced. Exam reveals no gallop, no friction rub and no decreased pulses.   Murmur heard.  Pulses:       Carotid pulses are 3+ on the right side, and 3+ on the left side.       Radial pulses are 3+ on the right side, and 3+ on the left side.   Pulmonary/Chest: Effort normal and breath sounds normal. No respiratory distress. He has no wheezes. He has no rales. He exhibits no tenderness.   Abdominal: Soft. Bowel sounds are normal. He exhibits no distension, no abdominal bruit and no mass. There is no splenomegaly or hepatomegaly. There is no tenderness. There is no rebound and no guarding.   Musculoskeletal: Normal range of motion. He exhibits no edema, tenderness or deformity.   Lymphadenopathy:     He has no cervical adenopathy.   Neurological: He is alert. He has normal reflexes. No cranial nerve deficit. He exhibits normal muscle tone. Coordination normal.   Skin: Skin is warm and dry. No rash noted. He is " not diaphoretic. No erythema.   Psychiatric: He has a normal mood and affect. His behavior is normal. Judgment and thought content normal.         Lab Review  Lab Results   Component Value Date     03/07/2019    K 4.3 03/07/2019     03/07/2019    BUN 18 03/07/2019    CREATININE 1.30 (H) 03/07/2019    GLUCOSE 126 (H) 03/07/2019    CALCIUM 9.4 03/07/2019    ALT 22 03/07/2019    ALKPHOS 95 03/07/2019    LABIL2 1.3 (L) 05/12/2016     Lab Results   Component Value Date    CKTOTAL 43 03/07/2019     Lab Results   Component Value Date    WBC 9.99 03/07/2019    HGB 13.9 (L) 03/07/2019    HCT 40.8 (L) 03/07/2019     03/07/2019     No results found for: INR  No results found for: MG  Lab Results   Component Value Date    TSH 3.773 11/08/2017     No results found for: BNP  Lab Results   Component Value Date    CHLPL 166 05/12/2016    CHOL 134 03/07/2019    TRIG 106 03/07/2019    HDL 45 (L) 03/07/2019    VLDL 21.2 03/07/2019    LDLHDL 1.51 03/07/2019     Lab Results   Component Value Date    LDL 68 03/07/2019    LDL 94 07/11/2018       Procedures       I personally viewed and interpreted the patient's LAB data         Assessment:     1. Coronary artery disease involving native coronary artery of native heart without angina pectoris    2. Essential hypertension    3. Valvular heart disease, mild to moderate AS    4. Hypercholesterolemia          Plan:     Patient has noncritical coronary artery disease.  He is doing very well without any cardiac symptoms.  Is advised to continue aspirin and beta-blocker therapy.  Blood pressure is very well controlled on Plendil and Toprol which will be continued.  Patient has aortic stenosis which is moderate he is asymptomatic we will check echocardiogram next visit.  He has hyperlipidemia LDL is 68 he will continue Lipitor 40 mg daily under directions of Dr. Navarro.  Follow-up scheduled in 6 months.        No Follow-up on file.

## 2019-09-12 ENCOUNTER — OFFICE VISIT (OUTPATIENT)
Dept: FAMILY MEDICINE CLINIC | Facility: CLINIC | Age: 84
End: 2019-09-12

## 2019-09-12 VITALS
HEART RATE: 57 BPM | HEIGHT: 70 IN | OXYGEN SATURATION: 96 % | WEIGHT: 176.8 LBS | DIASTOLIC BLOOD PRESSURE: 74 MMHG | SYSTOLIC BLOOD PRESSURE: 128 MMHG | TEMPERATURE: 97.3 F | BODY MASS INDEX: 25.31 KG/M2

## 2019-09-12 DIAGNOSIS — I38 VALVULAR HEART DISEASE: Primary | ICD-10-CM

## 2019-09-12 DIAGNOSIS — E78.00 HYPERCHOLESTEROLEMIA: ICD-10-CM

## 2019-09-12 DIAGNOSIS — I10 ESSENTIAL HYPERTENSION: ICD-10-CM

## 2019-09-12 DIAGNOSIS — Z23 NEEDS FLU SHOT: ICD-10-CM

## 2019-09-12 DIAGNOSIS — R73.01 IMPAIRED FASTING GLUCOSE: ICD-10-CM

## 2019-09-12 LAB
ALBUMIN SERPL-MCNC: 4.6 G/DL (ref 3.5–5.2)
ALBUMIN/GLOB SERPL: 1.3 G/DL
ALP SERPL-CCNC: 101 U/L (ref 39–117)
ALT SERPL W P-5'-P-CCNC: 16 U/L (ref 1–41)
ANION GAP SERPL CALCULATED.3IONS-SCNC: 12.9 MMOL/L (ref 5–15)
AST SERPL-CCNC: 18 U/L (ref 1–40)
BASOPHILS # BLD AUTO: 0.03 10*3/MM3 (ref 0–0.2)
BASOPHILS NFR BLD AUTO: 0.4 % (ref 0–1.5)
BILIRUB SERPL-MCNC: 0.7 MG/DL (ref 0.2–1.2)
BUN BLD-MCNC: 15 MG/DL (ref 8–23)
BUN/CREAT SERPL: 10.3 (ref 7–25)
CALCIUM SPEC-SCNC: 9.7 MG/DL (ref 8.6–10.5)
CHLORIDE SERPL-SCNC: 103 MMOL/L (ref 98–107)
CHOLEST SERPL-MCNC: 127 MG/DL (ref 0–200)
CO2 SERPL-SCNC: 25.1 MMOL/L (ref 22–29)
CREAT BLD-MCNC: 1.45 MG/DL (ref 0.76–1.27)
DEPRECATED RDW RBC AUTO: 41.9 FL (ref 37–54)
EOSINOPHIL # BLD AUTO: 0.16 10*3/MM3 (ref 0–0.4)
EOSINOPHIL NFR BLD AUTO: 1.9 % (ref 0.3–6.2)
ERYTHROCYTE [DISTWIDTH] IN BLOOD BY AUTOMATED COUNT: 12.3 % (ref 12.3–15.4)
GFR SERPL CREATININE-BSD FRML MDRD: 46 ML/MIN/1.73
GLOBULIN UR ELPH-MCNC: 3.5 GM/DL
GLUCOSE BLD-MCNC: 90 MG/DL (ref 65–99)
HBA1C MFR BLD: 5.89 % (ref 4.8–5.6)
HCT VFR BLD AUTO: 46.2 % (ref 37.5–51)
HDLC SERPL-MCNC: 41 MG/DL (ref 40–60)
HGB BLD-MCNC: 15.1 G/DL (ref 13–17.7)
IMM GRANULOCYTES # BLD AUTO: 0.04 10*3/MM3 (ref 0–0.05)
IMM GRANULOCYTES NFR BLD AUTO: 0.5 % (ref 0–0.5)
LDLC SERPL CALC-MCNC: 63 MG/DL (ref 0–100)
LDLC/HDLC SERPL: 1.53 {RATIO}
LYMPHOCYTES # BLD AUTO: 2.28 10*3/MM3 (ref 0.7–3.1)
LYMPHOCYTES NFR BLD AUTO: 27.3 % (ref 19.6–45.3)
MCH RBC QN AUTO: 30.4 PG (ref 26.6–33)
MCHC RBC AUTO-ENTMCNC: 32.7 G/DL (ref 31.5–35.7)
MCV RBC AUTO: 93.1 FL (ref 79–97)
MONOCYTES # BLD AUTO: 0.78 10*3/MM3 (ref 0.1–0.9)
MONOCYTES NFR BLD AUTO: 9.3 % (ref 5–12)
NEUTROPHILS # BLD AUTO: 5.07 10*3/MM3 (ref 1.7–7)
NEUTROPHILS NFR BLD AUTO: 60.6 % (ref 42.7–76)
NRBC BLD AUTO-RTO: 0 /100 WBC (ref 0–0.2)
PLATELET # BLD AUTO: 300 10*3/MM3 (ref 140–450)
PMV BLD AUTO: 11.8 FL (ref 6–12)
POTASSIUM BLD-SCNC: 4.6 MMOL/L (ref 3.5–5.2)
PROT SERPL-MCNC: 8.1 G/DL (ref 6–8.5)
RBC # BLD AUTO: 4.96 10*6/MM3 (ref 4.14–5.8)
SODIUM BLD-SCNC: 141 MMOL/L (ref 136–145)
TRIGL SERPL-MCNC: 117 MG/DL (ref 0–150)
TSH SERPL DL<=0.05 MIU/L-ACNC: 4.33 UIU/ML (ref 0.27–4.2)
VLDLC SERPL-MCNC: 23.4 MG/DL (ref 5–40)
WBC NRBC COR # BLD: 8.36 10*3/MM3 (ref 3.4–10.8)

## 2019-09-12 PROCEDURE — 80053 COMPREHEN METABOLIC PANEL: CPT | Performed by: FAMILY MEDICINE

## 2019-09-12 PROCEDURE — 36415 COLL VENOUS BLD VENIPUNCTURE: CPT | Performed by: FAMILY MEDICINE

## 2019-09-12 PROCEDURE — G0008 ADMIN INFLUENZA VIRUS VAC: HCPCS | Performed by: FAMILY MEDICINE

## 2019-09-12 PROCEDURE — 84443 ASSAY THYROID STIM HORMONE: CPT | Performed by: FAMILY MEDICINE

## 2019-09-12 PROCEDURE — 85025 COMPLETE CBC W/AUTO DIFF WBC: CPT | Performed by: FAMILY MEDICINE

## 2019-09-12 PROCEDURE — 80061 LIPID PANEL: CPT | Performed by: FAMILY MEDICINE

## 2019-09-12 PROCEDURE — 83036 HEMOGLOBIN GLYCOSYLATED A1C: CPT | Performed by: FAMILY MEDICINE

## 2019-09-12 PROCEDURE — 90653 IIV ADJUVANT VACCINE IM: CPT | Performed by: FAMILY MEDICINE

## 2019-09-12 PROCEDURE — 99213 OFFICE O/P EST LOW 20 MIN: CPT | Performed by: FAMILY MEDICINE

## 2019-09-12 NOTE — PROGRESS NOTES
Subjective   Clinton Ramirez is a 84 y.o. male.     History of Present Illness follow-up regarding hypertension dyslipidemia mild aortic stenosis.  Globally doing great.  Is visited with cardiology.  Compliant with medications.  Fairly active without regular activity.  Dietary compliance is good.  Denies respiratory CDV GI .  Denies dizziness.  Denies shortness of breath palpitations.  No accidents or falls lately.  The following portions of the patient's history were reviewed and updated as appropriate: allergies, current medications, past family history, past medical history, past surgical history and problem list.    Review of Systems  See history of Present Illness     Objective     Physical Exam   Constitutional: He is oriented to person, place, and time. He appears well-developed and well-nourished.   HENT:   Head: Normocephalic.   Neck: Normal range of motion. Neck supple. No tracheal deviation present. No thyromegaly present.   Cardiovascular: Normal rate, regular rhythm and intact distal pulses.   Systolic murmur at base   Pulmonary/Chest: Effort normal and breath sounds normal.   Musculoskeletal: He exhibits no edema.   Neurological: He is alert and oriented to person, place, and time.   Skin: Skin is warm and dry.   Psychiatric: He has a normal mood and affect.   Vitals reviewed.      PHQ-9 Total Score:      Patient's Body mass index is 25.37 kg/m². BMI is above normal parameters. Recommendations include: exercise counseling and nutrition counseling.   (Normal BMI:  18.5-24.9, OW 25-29.9, Obesity 30 or greater)      Assessment/Plan     Clinton was seen today for essential hypertension.    Diagnoses and all orders for this visit:    Valvular heart disease, mild to moderate AS  -     CBC & Differential  -     Comprehensive Metabolic Panel  -     CBC Auto Differential    Essential hypertension  -     TSH    Impaired fasting glucose  -     Comprehensive Metabolic Panel  -     Hemoglobin A1c  -     Lipid  Panel  -     TSH    Needs flu shot  -     Fluad Quad >65 years    Hypercholesterolemia  -     Comprehensive Metabolic Panel  -     Lipid Panel    Overall you seem to be doing quite well.  Continue medications as reconciled ordered.  Stay safely active maintain reasonable heart healthy diet.  After consent flu vaccine given.  Will check labs to monitor metabolic status.  Keep follow-up with cardiology.  Recheck here in about 6 months or as needed.                     This document has been electronically signed by Nolberto Navarro MD   September 12, 2019 10:32 AM

## 2019-09-13 NOTE — PROGRESS NOTES
Lab testing unremarkable.  Good results.  Continue all medications as ordered.  Continue to try to make good dietary choices.  Stay active.  Stay off  roof.

## 2019-12-11 DIAGNOSIS — I10 ESSENTIAL HYPERTENSION: ICD-10-CM

## 2019-12-11 RX ORDER — FELODIPINE 5 MG/1
5 TABLET, EXTENDED RELEASE ORAL DAILY
Qty: 90 TABLET | Refills: 3 | Status: SHIPPED | OUTPATIENT
Start: 2019-12-11 | End: 2021-02-24 | Stop reason: SDUPTHER

## 2019-12-11 NOTE — TELEPHONE ENCOUNTER
PATIENT LEFT MESSAGE ON [facility]ICEMAIL SEND REFILLS TO [facility]     FELODIPINE 3-12-19 # 90 REFILL X 3          LAST APPOINTMENT: 9-12-19  NEXT APPOINTMENT: -3-12-20

## 2020-02-24 ENCOUNTER — TELEPHONE (OUTPATIENT)
Dept: CARDIOLOGY | Facility: CLINIC | Age: 85
End: 2020-02-24

## 2020-02-24 ENCOUNTER — OFFICE VISIT (OUTPATIENT)
Dept: CARDIOLOGY | Facility: CLINIC | Age: 85
End: 2020-02-24

## 2020-02-24 VITALS
HEIGHT: 70 IN | OXYGEN SATURATION: 98 % | DIASTOLIC BLOOD PRESSURE: 68 MMHG | WEIGHT: 179 LBS | HEART RATE: 47 BPM | BODY MASS INDEX: 25.62 KG/M2 | SYSTOLIC BLOOD PRESSURE: 138 MMHG

## 2020-02-24 DIAGNOSIS — R00.2 PALPITATIONS: ICD-10-CM

## 2020-02-24 DIAGNOSIS — R07.9 CHEST PAIN IN ADULT: ICD-10-CM

## 2020-02-24 DIAGNOSIS — I25.10 CORONARY ARTERY DISEASE INVOLVING NATIVE CORONARY ARTERY OF NATIVE HEART WITHOUT ANGINA PECTORIS: ICD-10-CM

## 2020-02-24 DIAGNOSIS — E78.00 HYPERCHOLESTEROLEMIA: ICD-10-CM

## 2020-02-24 DIAGNOSIS — I38 VALVULAR HEART DISEASE: Primary | ICD-10-CM

## 2020-02-24 DIAGNOSIS — R00.1 BRADYCARDIA, SINUS: ICD-10-CM

## 2020-02-24 DIAGNOSIS — I73.9 PAD (PERIPHERAL ARTERY DISEASE) (HCC): ICD-10-CM

## 2020-02-24 DIAGNOSIS — I49.8 SINUS ARRHYTHMIA: ICD-10-CM

## 2020-02-24 DIAGNOSIS — I10 ESSENTIAL HYPERTENSION: ICD-10-CM

## 2020-02-24 PROCEDURE — 99214 OFFICE O/P EST MOD 30 MIN: CPT | Performed by: INTERNAL MEDICINE

## 2020-02-24 PROCEDURE — 93000 ELECTROCARDIOGRAM COMPLETE: CPT | Performed by: INTERNAL MEDICINE

## 2020-02-24 NOTE — PROGRESS NOTES
subjective     Chief Complaint   Patient presents with   • Coronary Artery Disease   • Hypertension   • Extremity Weakness     pt complains of calfs itching and burning,      History of Present Illness    Patient is 84 years old white male with history of moderate aortic stenosis, patient is here for follow-up and states that he is weak in his lower extremities.  Wife states that he falls and feet and legs itch and burn.  He also gets tired easily.  He has no stamina but no syncope or near syncope.  Patient has nonobstructive coronary artery disease in the past.  He does have hyperlipidemia and hypertension.  Family is concerned about circulation in the lower extremities.  Past Surgical History:   Procedure Laterality Date   • BIOPSY PROSTATE NEEDLE / PUNCH / INCISIONAL     • CARDIAC CATHETERIZATION N/A 7/11/2018    Procedure: Left Heart Cath;  Surgeon: Claire Scott MD;  Location: [facility] INVASIVE LOCATION;  Service: Cardiovascular   • COLONOSCOPY     • EAR BIOPSY      basal cell skin cancer   • SKIN CANCER EXCISION      melanoma from neck      Family History   Problem Relation Age of Onset   • Cancer Sister    • Tuberculosis Other    • Diabetes Other    • Heart disease Father      Past Medical History:   Diagnosis Date   • Arthritis     hands   • Elevated cholesterol    • Hypertension    • Hypokalemia    • Multiple benign polyps of large intestine    • Need for prophylactic vaccination and inoculation against influenza    • Pancreatitis    • Paralytic ileus (CMS/HCC)    • PPD positive     negative chest xray    • Renal insufficiency     mild-told to stop Naproxen   • Rheumatic fever     age 20 while in -hospitalized    • Skin cancer     basal cell from ears, melanoma from neck area    • Valvular disease    • Wears glasses    • Wears partial dentures     upper      Patient Active Problem List   Diagnosis   • Bone spur   • Cervical radiculopathy   • Elevated prostate specific antigen (PSA)   •  Hypercholesterolemia   • Essential hypertension   • Impaired fasting glucose   • Osteoarthritis   • Recent skin changes   • Shoulder joint pain   • Tinnitus   • Heart murmur   • Valvular heart disease, mild to moderate AS   • Angina pectoris (CMS/HCC)   • Dyspnea on exertion   • Right bundle branch block   • Sinus arrhythmia   • Abnormal stress test   • History of kidney disease   • Coronary artery disease, 50% mid circumflex lesion and nonobstructive PDA lesion   • Bradycardia, sinus       Social History     Tobacco Use   • Smoking status: Former Smoker     Packs/day: 3.00     Years: 11.00     Pack years: 33.00     Last attempt to quit:      Years since quittin.1   • Smokeless tobacco: Never Used   Substance Use Topics   • Alcohol use: Yes     Alcohol/week: 7.0 standard drinks     Types: 7 Cans of beer per week     Comment: 1 beer daily sometimes 2    • Drug use: No       No Known Allergies    Current Outpatient Medications on File Prior to Visit   Medication Sig   • aspirin 81 MG chewable tablet Chew 81 mg Every Morning.   • atorvastatin (LIPITOR) 40 MG tablet Take 1 tablet by mouth Daily.   • felodipine (PLENDIL) 5 MG 24 hr tablet Take 1 tablet by mouth Daily.   • ibuprofen (ADVIL,MOTRIN) 200 MG tablet Take 200 mg by mouth Every 6 (Six) Hours As Needed for Mild Pain .   • metoprolol succinate XL (TOPROL-XL) 25 MG 24 hr tablet Take 1 tablet by mouth Daily.     No current facility-administered medications on file prior to visit.          The following portions of the patient's history were reviewed and updated as appropriate: allergies, current medications, past family history, past medical history, past social history, past surgical history and problem list.    Review of Systems   Constitution: Positive for malaise/fatigue.   HENT: Negative.  Negative for congestion.    Eyes: Negative.    Cardiovascular: Positive for dyspnea on exertion. Negative for chest pain, cyanosis, irregular heartbeat, leg  "swelling, near-syncope, orthopnea, palpitations, paroxysmal nocturnal dyspnea and syncope.   Respiratory: Positive for shortness of breath.    Hematologic/Lymphatic: Negative.    Musculoskeletal: Negative.    Gastrointestinal: Negative.    Neurological: Positive for dizziness. Negative for headaches.          Objective:     /68 (BP Location: Left arm, Patient Position: Sitting, Cuff Size: Adult)   Pulse (!) 47   Ht 177.8 cm (70\")   Wt 81.2 kg (179 lb)   SpO2 98%   BMI 25.68 kg/m²   Physical Exam   Constitutional: He appears well-developed and well-nourished. No distress.   HENT:   Head: Normocephalic and atraumatic.   Mouth/Throat: Oropharynx is clear and moist. No oropharyngeal exudate.   Eyes: Pupils are equal, round, and reactive to light. Conjunctivae and EOM are normal. No scleral icterus.   Neck: Normal range of motion. Neck supple. No JVD present. No tracheal deviation present. No thyromegaly present.   Cardiovascular: Regular rhythm and intact distal pulses. Bradycardia present. PMI is not displaced. Exam reveals no gallop, no friction rub and no decreased pulses.   Murmur heard.  Pulses:       Carotid pulses are 3+ on the right side, and 3+ on the left side.       Radial pulses are 3+ on the right side, and 3+ on the left side.        Femoral pulses are 2+ on the right side, and 2+ on the left side.       Dorsalis pedis pulses are 0 on the right side, and 0 on the left side.        Posterior tibial pulses are 0 on the right side, and 0 on the left side.   Grade 2/6 aortic ejection murmur   Pulmonary/Chest: Effort normal and breath sounds normal. No respiratory distress. He has no wheezes. He has no rales. He exhibits no tenderness.   Abdominal: Soft. Bowel sounds are normal. He exhibits no distension, no abdominal bruit and no mass. There is no splenomegaly or hepatomegaly. There is no tenderness. There is no rebound and no guarding.   Musculoskeletal: Normal range of motion. He exhibits no " edema, tenderness or deformity.   Lymphadenopathy:     He has no cervical adenopathy.   Neurological: He is alert. He has normal reflexes. No cranial nerve deficit. He exhibits normal muscle tone. Coordination normal.   Skin: Skin is warm and dry. No rash noted. He is not diaphoretic. No erythema.   Psychiatric: He has a normal mood and affect. His behavior is normal. Judgment and thought content normal.         Lab Review  Lab Results   Component Value Date     09/12/2019    K 4.6 09/12/2019     09/12/2019    BUN 15 09/12/2019    CREATININE 1.45 (H) 09/12/2019    GLUCOSE 90 09/12/2019    CALCIUM 9.7 09/12/2019    ALT 16 09/12/2019    ALKPHOS 101 09/12/2019    LABIL2 1.3 (L) 05/12/2016     Lab Results   Component Value Date    CKTOTAL 43 03/07/2019     Lab Results   Component Value Date    WBC 8.36 09/12/2019    HGB 15.1 09/12/2019    HCT 46.2 09/12/2019     09/12/2019     No results found for: INR  No results found for: MG  Lab Results   Component Value Date    TSH 4.330 (H) 09/12/2019     No results found for: BNP  Lab Results   Component Value Date    CHLPL 166 05/12/2016    CHOL 127 09/12/2019    TRIG 117 09/12/2019    HDL 41 09/12/2019    VLDL 23.4 09/12/2019    LDLHDL 1.53 09/12/2019     Lab Results   Component Value Date    LDL 63 09/12/2019    LDL 68 03/07/2019         ECG 12 Lead  Date/Time: 2/24/2020 6:00 PM  Performed by: Sandra Torres MD  Authorized by: Sandra Torres MD   Comparison: compared with previous ECG from 12/9/2018  Comparison to previous ECG: Heart rate is slower, no PAC  Rhythm: sinus rhythm and sinus bradycardia  Rate: bradycardic  BPM: 47  Conduction: right bundle branch block  QRS axis: normal    Clinical impression: abnormal EKG               I personally viewed and interpreted the patient's LAB data         Assessment:     1. Valvular heart disease, mild to moderate AS    2. Hypercholesterolemia    3. Essential hypertension    4. Coronary artery  disease involving native coronary artery of native heart without angina pectoris    5. PAD (peripheral artery disease) (CMS/[facility])    6. Sinus arrhythmia    7. Palpitations    8. Bradycardia, sinus          Plan:     Patient is 84 years old elderly gentleman who has history of aortic stenosis and sick sinus syndrome.  Patient is having significant bradycardia, heart rate is 47 bpm.    Patient was advised to have a 24 hours Holter monitor done and also decrease Toprol-XL 12.5 daily    Legs are weak and peripheral pulses are not palpable below the knee.  Will get arterial Doppler study for further evaluation.  Patient complains of dyspnea on exertion and shortness of breath he has aortic stenosis will check echocardiogram for follow-up.  Follow-up scheduled      No follow-ups on file.

## 2020-02-24 NOTE — TELEPHONE ENCOUNTER
Called to remind Clinton to take half of the metooprolol 12.5mg daily .  Also to go and get his holter monitor 24hr at the hospital.  He agreed and v/u.

## 2020-02-25 ENCOUNTER — HOSPITAL ENCOUNTER (OUTPATIENT)
Dept: RESPIRATORY THERAPY | Facility: HOSPITAL | Age: 85
Discharge: HOME | End: 2020-02-25
Admitting: INTERNAL MEDICINE

## 2020-02-25 DIAGNOSIS — R00.2 PALPITATIONS: ICD-10-CM

## 2020-02-25 DIAGNOSIS — R00.1 BRADYCARDIA, SINUS: ICD-10-CM

## 2020-02-25 PROCEDURE — 93226 XTRNL ECG REC<48 HR SCAN A/R: CPT

## 2020-02-25 PROCEDURE — 93225 XTRNL ECG REC<48 HRS REC: CPT

## 2020-02-28 ENCOUNTER — HOSPITAL ENCOUNTER (OUTPATIENT)
Dept: CARDIOLOGY | Facility: HOSPITAL | Age: 85
Discharge: HOME | End: 2020-02-28
Admitting: INTERNAL MEDICINE

## 2020-02-28 ENCOUNTER — TELEPHONE (OUTPATIENT)
Dept: CARDIOLOGY | Facility: CLINIC | Age: 85
End: 2020-02-28

## 2020-02-28 ENCOUNTER — HOSPITAL ENCOUNTER (OUTPATIENT)
Dept: CARDIOLOGY | Facility: HOSPITAL | Age: 85
Discharge: HOME | End: 2020-02-28

## 2020-02-28 DIAGNOSIS — I73.9 PAD (PERIPHERAL ARTERY DISEASE) (HCC): ICD-10-CM

## 2020-02-28 DIAGNOSIS — I38 VALVULAR HEART DISEASE: ICD-10-CM

## 2020-02-28 LAB
BH CV ECHO MEAS - % IVS THICK: -9 %
BH CV ECHO MEAS - % LVPW THICK: 1.2 %
BH CV ECHO MEAS - ACS: 1.3 CM
BH CV ECHO MEAS - AO MAX PG (FULL): 22.9 MMHG
BH CV ECHO MEAS - AO MAX PG: 28.5 MMHG
BH CV ECHO MEAS - AO MEAN PG (FULL): 9 MMHG
BH CV ECHO MEAS - AO MEAN PG: 12 MMHG
BH CV ECHO MEAS - AO ROOT AREA (BSA CORRECTED): 1.8
BH CV ECHO MEAS - AO ROOT AREA: 10.2 CM^2
BH CV ECHO MEAS - AO ROOT DIAM: 3.6 CM
BH CV ECHO MEAS - AO V2 MAX: 267 CM/SEC
BH CV ECHO MEAS - AO V2 MEAN: 156 CM/SEC
BH CV ECHO MEAS - AO V2 VTI: 72.5 CM
BH CV ECHO MEAS - AVA(I,A): 1.5 CM^2
BH CV ECHO MEAS - AVA(I,D): 1.5 CM^2
BH CV ECHO MEAS - AVA(V,A): 1.5 CM^2
BH CV ECHO MEAS - AVA(V,D): 1.5 CM^2
BH CV ECHO MEAS - BSA(HAYCOCK): 2 M^2
BH CV ECHO MEAS - BSA: 2 M^2
BH CV ECHO MEAS - BZI_BMI: 25.7 KILOGRAMS/M^2
BH CV ECHO MEAS - BZI_METRIC_HEIGHT: 177.8 CM
BH CV ECHO MEAS - BZI_METRIC_WEIGHT: 81.2 KG
BH CV ECHO MEAS - EDV(CUBED): 80.1 ML
BH CV ECHO MEAS - EDV(MOD-SP4): 65.9 ML
BH CV ECHO MEAS - EDV(TEICH): 83.5 ML
BH CV ECHO MEAS - EF(CUBED): 74.3 %
BH CV ECHO MEAS - EF(MOD-SP4): 63.9 %
BH CV ECHO MEAS - EF(TEICH): 66.5 %
BH CV ECHO MEAS - ESV(CUBED): 20.6 ML
BH CV ECHO MEAS - ESV(MOD-SP4): 23.8 ML
BH CV ECHO MEAS - ESV(TEICH): 28 ML
BH CV ECHO MEAS - FS: 36.4 %
BH CV ECHO MEAS - IVS/LVPW: 1
BH CV ECHO MEAS - IVSD: 1.3 CM
BH CV ECHO MEAS - IVSS: 1.2 CM
BH CV ECHO MEAS - LA DIMENSION: 4.3 CM
BH CV ECHO MEAS - LA/AO: 1.2
BH CV ECHO MEAS - LV DIASTOLIC VOL/BSA (35-75): 33.1 ML/M^2
BH CV ECHO MEAS - LV MASS(C)D: 195.6 GRAMS
BH CV ECHO MEAS - LV MASS(C)DI: 98.2 GRAMS/M^2
BH CV ECHO MEAS - LV MASS(C)S: 95.8 GRAMS
BH CV ECHO MEAS - LV MASS(C)SI: 48.1 GRAMS/M^2
BH CV ECHO MEAS - LV MAX PG: 5.7 MMHG
BH CV ECHO MEAS - LV MEAN PG: 3 MMHG
BH CV ECHO MEAS - LV SYSTOLIC VOL/BSA (12-30): 12 ML/M^2
BH CV ECHO MEAS - LV V1 MAX: 119 CM/SEC
BH CV ECHO MEAS - LV V1 MEAN: 78.3 CM/SEC
BH CV ECHO MEAS - LV V1 VTI: 31.9 CM
BH CV ECHO MEAS - LVIDD: 4.3 CM
BH CV ECHO MEAS - LVIDS: 2.7 CM
BH CV ECHO MEAS - LVLD AP4: 6.3 CM
BH CV ECHO MEAS - LVLS AP4: 5.8 CM
BH CV ECHO MEAS - LVOT AREA (M): 3.5 CM^2
BH CV ECHO MEAS - LVOT AREA: 3.5 CM^2
BH CV ECHO MEAS - LVOT DIAM: 2.1 CM
BH CV ECHO MEAS - LVPWD: 1.2 CM
BH CV ECHO MEAS - LVPWS: 1.2 CM
BH CV ECHO MEAS - MV A MAX VEL: 121 CM/SEC
BH CV ECHO MEAS - MV E MAX VEL: 131 CM/SEC
BH CV ECHO MEAS - MV E/A: 1.1
BH CV ECHO MEAS - PA ACC TIME: 0.07 SEC
BH CV ECHO MEAS - PA PR(ACCEL): 45.7 MMHG
BH CV ECHO MEAS - RAP SYSTOLE: 10 MMHG
BH CV ECHO MEAS - RVSP: 50 MMHG
BH CV ECHO MEAS - SI(AO): 370.6 ML/M^2
BH CV ECHO MEAS - SI(CUBED): 29.9 ML/M^2
BH CV ECHO MEAS - SI(LVOT): 55.5 ML/M^2
BH CV ECHO MEAS - SI(MOD-SP4): 21.1 ML/M^2
BH CV ECHO MEAS - SI(TEICH): 27.9 ML/M^2
BH CV ECHO MEAS - SV(AO): 738 ML
BH CV ECHO MEAS - SV(CUBED): 59.5 ML
BH CV ECHO MEAS - SV(LVOT): 110.5 ML
BH CV ECHO MEAS - SV(MOD-SP4): 42.1 ML
BH CV ECHO MEAS - SV(TEICH): 55.5 ML
BH CV ECHO MEAS - TR MAX VEL: 331.5 CM/SEC

## 2020-02-28 PROCEDURE — 93925 LOWER EXTREMITY STUDY: CPT | Performed by: RADIOLOGY

## 2020-02-28 PROCEDURE — 93306 TTE W/DOPPLER COMPLETE: CPT | Performed by: INTERNAL MEDICINE

## 2020-02-28 PROCEDURE — 93306 TTE W/DOPPLER COMPLETE: CPT

## 2020-02-28 PROCEDURE — 93227 XTRNL ECG REC<48 HR R&I: CPT | Performed by: INTERNAL MEDICINE

## 2020-02-28 PROCEDURE — 93925 LOWER EXTREMITY STUDY: CPT

## 2020-02-28 NOTE — TELEPHONE ENCOUNTER
----- Message from Sandra Torres MD sent at 2/28/2020 11:29 AM EST -----  Holter monitor shows that heart rate is better.  There are heart skipping beats, atrial premature beats  Continue metoprolol 25 mg half pill a day

## 2020-03-02 ENCOUNTER — TELEPHONE (OUTPATIENT)
Dept: CARDIOLOGY | Facility: CLINIC | Age: 85
End: 2020-03-02

## 2020-03-02 NOTE — TELEPHONE ENCOUNTER
----- Message from Sandra Torres MD sent at 3/2/2020  4:33 PM EST -----  Arterial Doppler is good there is no blockage in the legs.  Echocardiogram shows moderate aortic valve stenosis which is not changed any from last study  Just decrease the Toprol dose as mentioned on the last office visit

## 2020-03-12 ENCOUNTER — OFFICE VISIT (OUTPATIENT)
Dept: FAMILY MEDICINE CLINIC | Facility: CLINIC | Age: 85
End: 2020-03-12

## 2020-03-12 VITALS
WEIGHT: 181.8 LBS | BODY MASS INDEX: 26.03 KG/M2 | HEART RATE: 50 BPM | DIASTOLIC BLOOD PRESSURE: 78 MMHG | SYSTOLIC BLOOD PRESSURE: 140 MMHG | OXYGEN SATURATION: 98 % | TEMPERATURE: 97.1 F | HEIGHT: 70 IN

## 2020-03-12 DIAGNOSIS — I38 VALVULAR HEART DISEASE: ICD-10-CM

## 2020-03-12 DIAGNOSIS — E78.00 HYPERCHOLESTEROLEMIA: ICD-10-CM

## 2020-03-12 DIAGNOSIS — I10 ESSENTIAL HYPERTENSION: ICD-10-CM

## 2020-03-12 DIAGNOSIS — R41.89 COGNITIVE DECLINE: ICD-10-CM

## 2020-03-12 DIAGNOSIS — R73.01 IMPAIRED FASTING GLUCOSE: Primary | ICD-10-CM

## 2020-03-12 DIAGNOSIS — Z00.00 MEDICARE ANNUAL WELLNESS VISIT, SUBSEQUENT: ICD-10-CM

## 2020-03-12 PROCEDURE — 36415 COLL VENOUS BLD VENIPUNCTURE: CPT | Performed by: FAMILY MEDICINE

## 2020-03-12 PROCEDURE — 85025 COMPLETE CBC W/AUTO DIFF WBC: CPT | Performed by: FAMILY MEDICINE

## 2020-03-12 PROCEDURE — G0439 PPPS, SUBSEQ VISIT: HCPCS | Performed by: FAMILY MEDICINE

## 2020-03-12 PROCEDURE — 83036 HEMOGLOBIN GLYCOSYLATED A1C: CPT | Performed by: FAMILY MEDICINE

## 2020-03-12 PROCEDURE — 80053 COMPREHEN METABOLIC PANEL: CPT | Performed by: FAMILY MEDICINE

## 2020-03-12 PROCEDURE — 80061 LIPID PANEL: CPT | Performed by: FAMILY MEDICINE

## 2020-03-12 RX ORDER — DONEPEZIL HYDROCHLORIDE 5 MG/1
5 TABLET, ORALLY DISINTEGRATING ORAL DAILY
Qty: 90 TABLET | Refills: 0 | Status: SHIPPED | OUTPATIENT
Start: 2020-03-12 | End: 2020-05-11 | Stop reason: SDUPTHER

## 2020-03-12 RX ORDER — METOPROLOL SUCCINATE 25 MG/1
12.5 TABLET, EXTENDED RELEASE ORAL DAILY
Qty: 90 TABLET | Refills: 3 | Status: SHIPPED | COMMUNITY
Start: 2020-03-12 | End: 2020-12-21 | Stop reason: SDUPTHER

## 2020-03-12 NOTE — PROGRESS NOTES
The ABCs of the Annual Wellness Visit  Subsequent Medicare Wellness Visit    Chief Complaint   Patient presents with   • Hypertension   • Medicare Wellness-subsequent       Subjective   History of Present Illness:  Clinton Ramirez is a 84 y.o. male who presents for a Subsequent Medicare Wellness Visit.  Follow-up hypertension still with occasional palpitations and easy fatigability.  Has visited with cardiology.  Records reviewed.  Spouse present today and is very forthcoming regarding concerns about cognitive decline memory impairment.  At present you deny CP shortness of breath GI  orthopedic skin concerns.  You have a very consistent routine about home.  Compliant with medications.  Good energy.  Rest well.  No accidents or falls recently.    HEALTH RISK ASSESSMENT    Recent Hospitalizations:  No hospitalization(s) within the last year.    Current Medical Providers:  Patient Care Team:  Nolberto Navarro MD as PCP - General  Nolberto Navarro MD as PCP - Family Medicine  [facility] ([facility]  Ean Richards MD as Consulting Physician (Urology)  Sandra Torres MD as Consulting Physician (Cardiology)    Smoking Status:  Social History     Tobacco Use   Smoking Status Former Smoker   • Packs/day: 3.00   • Years: 11.00   • Pack years: 33.00   • Last attempt to quit:    • Years since quittin.2   Smokeless Tobacco Never Used       Alcohol Consumption:  Social History     Substance and Sexual Activity   Alcohol Use Yes   • Alcohol/week: 7.0 standard drinks   • Types: 7 Cans of beer per week    Comment: 1 beer daily sometimes 2        Depression Screen:   PHQ-2/PHQ-9 Depression Screening 3/12/2020   Little interest or pleasure in doing things 0   Feeling down, depressed, or hopeless 0   Trouble falling or staying asleep, or sleeping too much -   Feeling tired or having little energy -   Poor appetite or overeating -   Feeling bad about  yourself - or that you are a failure or have let yourself or your family down -   Trouble concentrating on things, such as reading the newspaper or watching television -   Moving or speaking so slowly that other people could have noticed. Or the opposite - being so fidgety or restless that you have been moving around a lot more than usual -   Thoughts that you would be better off dead, or of hurting yourself in some way -   Total Score 0   If you checked off any problems, how difficult have these problems made it for you to do your work, take care of things at home, or get along with other people? -       Fall Risk Screen:  CUBA Fall Risk Assessment has not been completed.    Health Habits and Functional and Cognitive Screening:  Functional & Cognitive Status 3/12/2020   Do you have difficulty preparing food and eating? No   Do you have difficulty bathing yourself, getting dressed or grooming yourself? No   Do you have difficulty using the toilet? No   Do you have difficulty moving around from place to place? No   Do you have trouble with steps or getting out of a bed or a chair? Yes   Current Diet Well Balanced Diet   Dental Exam Not up to date   Eye Exam Not up to date   Exercise (times per week) 7 times per week   Current Exercise Activities Include Walking   Do you need help using the phone?  No   Are you deaf or do you have serious difficulty hearing?  No   Do you need help with transportation? No   Do you need help shopping? No   Do you need help preparing meals?  No   Do you need help with housework?  No   Do you need help with laundry? No   Do you need help taking your medications? No   Do you need help managing money? No   Do you ever drive or ride in a car without wearing a seat belt? No   Have you felt unusual stress, anger or loneliness in the last month? Yes   Who do you live with? Spouse   If you need help, do you have trouble finding someone available to you? Yes   Have you been bothered in the last  four weeks by sexual problems? Yes   Do you have difficulty concentrating, remembering or making decisions? Yes         Does the patient have evidence of cognitive impairment? Yes    Asprin use counseling:Taking ASA appropriately as indicated    Age-appropriate Screening Schedule:  Refer to the list below for future screening recommendations based on patient's age, sex and/or medical conditions. Orders for these recommended tests are listed in the plan section. The patient has been provided with a written plan.    Health Maintenance   Topic Date Due   • ZOSTER VACCINE (2 of 3) 03/13/2014   • TDAP/TD VACCINES (2 - Td) 05/03/2020 (Originally 1/1/2015)   • LIPID PANEL  09/12/2020   • INFLUENZA VACCINE  Completed          The following portions of the patient's history were reviewed and updated as appropriate: allergies, current medications, past medical history, past social history, past surgical history and problem list.    Outpatient Medications Prior to Visit   Medication Sig Dispense Refill   • aspirin 81 MG chewable tablet Chew 81 mg Every Morning.     • atorvastatin (LIPITOR) 40 MG tablet Take 1 tablet by mouth Daily. 90 tablet 3   • felodipine (PLENDIL) 5 MG 24 hr tablet Take 1 tablet by mouth Daily. 90 tablet 3   • ibuprofen (ADVIL,MOTRIN) 200 MG tablet Take 200 mg by mouth Every 6 (Six) Hours As Needed for Mild Pain .     • metoprolol succinate XL (TOPROL-XL) 25 MG 24 hr tablet Take 0.5 tablets by mouth Daily. 90 tablet 3   • metoprolol succinate XL (TOPROL-XL) 25 MG 24 hr tablet Take 1 tablet by mouth Daily. 90 tablet 3     No facility-administered medications prior to visit.        Patient Active Problem List   Diagnosis   • Bone spur   • Cervical radiculopathy   • Elevated prostate specific antigen (PSA)   • Hypercholesterolemia   • Essential hypertension   • Impaired fasting glucose   • Osteoarthritis   • Recent skin changes   • Tinnitus   • Heart murmur   • Valvular heart disease, mild to moderate AS   •  "Angina pectoris (CMS/[facility])   • Dyspnea on exertion   • Right bundle branch block   • Sinus arrhythmia   • Abnormal stress test   • History of kidney disease   • Coronary artery disease, 50% mid circumflex lesion and nonobstructive PDA lesion   • Bradycardia, sinus       Advanced Care Planning:  ACP discussion was declined by the patient. Patient has an advance directive in EMR which is still valid.     Review of Systems    Compared to one year ago, the patient feels his physical health is worse.  Compared to one year ago, the patient feels his mental health is worse.    Reviewed chart for potential of high risk medication in the elderly: yes  Reviewed chart for potential of harmful drug interactions in the elderly:yes    Objective         Vitals:    03/12/20 1021   BP: 140/78   BP Location: Right arm   Patient Position: Sitting   Cuff Size: Adult   Pulse: 50   Temp: 97.1 °F (36.2 °C)   SpO2: 98%   Weight: 82.5 kg (181 lb 12.8 oz)   Height: 177.8 cm (70\")       Body mass index is 26.09 kg/m².  Discussed the patient's BMI with him. The BMI is above average; BMI management plan is completed.    Physical Exam   Constitutional: He is oriented to person, place, and time. He appears well-developed and well-nourished.   Neck: Normal range of motion. Neck supple. No tracheal deviation present. No thyromegaly present.   Cardiovascular: Normal rate, regular rhythm and intact distal pulses.   Systolic murmur at base   Pulmonary/Chest: Effort normal and breath sounds normal.   Musculoskeletal: He exhibits no edema.   Neurological: He is alert and oriented to person, place, and time.   Skin: Skin is warm and dry.   Psychiatric: He has a normal mood and affect. Thought content normal.   Vitals reviewed.            Assessment/Plan   Medicare Risks and Personalized Health Plan  CMS Preventative Services Quick Reference  Cardiovascular risk  Dementia/Memory     The above risks/problems have been discussed with the patient.  Pertinent " information has been shared with the patient in the After Visit Summary.  Follow up plans and orders are seen below in the Assessment/Plan Section.    Diagnoses and all orders for this visit:    1. Impaired fasting glucose (Primary)  -     CBC & Differential  -     Comprehensive Metabolic Panel  -     Hemoglobin A1c  -     Lipid Panel  -     CBC Auto Differential    2. Valvular heart disease, mild to moderate AS  -     Comprehensive Metabolic Panel    3. Essential hypertension  -     Comprehensive Metabolic Panel    4. Medicare annual wellness visit, subsequent    5. Hypercholesterolemia  -     Comprehensive Metabolic Panel  -     Lipid Panel    6. Cognitive decline  -     donepezil ODT (ARICEPT ODT) 5 MG disintegrating tablet; Take 1 tablet by mouth Daily.  Dispense: 90 tablet; Refill: 0      Follow Up:  Return in about 2 months (around 5/12/2020).     An After Visit Summary and PPPS were given to the patient.     Today we will check labs to monitor metabolic status.  Continue medications as currently ordered.  The ACE 3 MINI cognitive screen reviewed.  High likelihood of cognitive decline dementia confirmed.  Discussed possibilities with you and wife.  At this time elect to initiate medication in regard to helping with symptoms.  Low-dose Aricept started.  Counseled about that medication.  Would ask you to recheck in about 2 months or as needed.  Spent a good deal time today concerned about the cognitive decline memory impairment.

## 2020-03-13 LAB
ALBUMIN SERPL-MCNC: 4.5 G/DL (ref 3.5–5.2)
ALBUMIN/GLOB SERPL: 1.5 G/DL
ALP SERPL-CCNC: 89 U/L (ref 39–117)
ALT SERPL W P-5'-P-CCNC: 20 U/L (ref 1–41)
ANION GAP SERPL CALCULATED.3IONS-SCNC: 13.8 MMOL/L (ref 5–15)
AST SERPL-CCNC: 24 U/L (ref 1–40)
BASOPHILS # BLD AUTO: 0.06 10*3/MM3 (ref 0–0.2)
BASOPHILS NFR BLD AUTO: 0.7 % (ref 0–1.5)
BILIRUB SERPL-MCNC: 0.6 MG/DL (ref 0.2–1.2)
BUN BLD-MCNC: 13 MG/DL (ref 8–23)
BUN/CREAT SERPL: 11.6 (ref 7–25)
CALCIUM SPEC-SCNC: 9.1 MG/DL (ref 8.6–10.5)
CHLORIDE SERPL-SCNC: 105 MMOL/L (ref 98–107)
CHOLEST SERPL-MCNC: 147 MG/DL (ref 0–200)
CO2 SERPL-SCNC: 22.2 MMOL/L (ref 22–29)
CREAT BLD-MCNC: 1.12 MG/DL (ref 0.76–1.27)
DEPRECATED RDW RBC AUTO: 39.8 FL (ref 37–54)
EOSINOPHIL # BLD AUTO: 0.16 10*3/MM3 (ref 0–0.4)
EOSINOPHIL NFR BLD AUTO: 1.8 % (ref 0.3–6.2)
ERYTHROCYTE [DISTWIDTH] IN BLOOD BY AUTOMATED COUNT: 12 % (ref 12.3–15.4)
GFR SERPL CREATININE-BSD FRML MDRD: 62 ML/MIN/1.73
GLOBULIN UR ELPH-MCNC: 3.1 GM/DL
GLUCOSE BLD-MCNC: 104 MG/DL (ref 65–99)
HBA1C MFR BLD: 5.75 % (ref 4.8–5.6)
HCT VFR BLD AUTO: 42.3 % (ref 37.5–51)
HDLC SERPL-MCNC: 46 MG/DL (ref 40–60)
HGB BLD-MCNC: 14.9 G/DL (ref 13–17.7)
IMM GRANULOCYTES # BLD AUTO: 0.02 10*3/MM3 (ref 0–0.05)
IMM GRANULOCYTES NFR BLD AUTO: 0.2 % (ref 0–0.5)
LDLC SERPL CALC-MCNC: 77 MG/DL (ref 0–100)
LDLC/HDLC SERPL: 1.67 {RATIO}
LYMPHOCYTES # BLD AUTO: 1.89 10*3/MM3 (ref 0.7–3.1)
LYMPHOCYTES NFR BLD AUTO: 21.6 % (ref 19.6–45.3)
MCH RBC QN AUTO: 31.8 PG (ref 26.6–33)
MCHC RBC AUTO-ENTMCNC: 35.2 G/DL (ref 31.5–35.7)
MCV RBC AUTO: 90.4 FL (ref 79–97)
MONOCYTES # BLD AUTO: 0.8 10*3/MM3 (ref 0.1–0.9)
MONOCYTES NFR BLD AUTO: 9.2 % (ref 5–12)
NEUTROPHILS # BLD AUTO: 5.81 10*3/MM3 (ref 1.7–7)
NEUTROPHILS NFR BLD AUTO: 66.5 % (ref 42.7–76)
NRBC BLD AUTO-RTO: 0 /100 WBC (ref 0–0.2)
PLATELET # BLD AUTO: 250 10*3/MM3 (ref 140–450)
PMV BLD AUTO: 12.5 FL (ref 6–12)
POTASSIUM BLD-SCNC: 4.2 MMOL/L (ref 3.5–5.2)
PROT SERPL-MCNC: 7.6 G/DL (ref 6–8.5)
RBC # BLD AUTO: 4.68 10*6/MM3 (ref 4.14–5.8)
SODIUM BLD-SCNC: 141 MMOL/L (ref 136–145)
TRIGL SERPL-MCNC: 122 MG/DL (ref 0–150)
VLDLC SERPL-MCNC: 24.4 MG/DL (ref 5–40)
WBC NRBC COR # BLD: 8.74 10*3/MM3 (ref 3.4–10.8)

## 2020-03-16 ENCOUNTER — TELEPHONE (OUTPATIENT)
Dept: FAMILY MEDICINE CLINIC | Facility: CLINIC | Age: 85
End: 2020-03-16

## 2020-03-16 NOTE — TELEPHONE ENCOUNTER
Patient states that he got a call but there was so much interference he could not tell what they said.  He can be reached at [phone]

## 2020-05-08 RX ORDER — DONEPEZIL HYDROCHLORIDE 5 MG/1
5 TABLET, ORALLY DISINTEGRATING ORAL DAILY
Qty: 90 TABLET | Refills: 0 | Status: CANCELLED | OUTPATIENT
Start: 2020-05-08

## 2020-05-11 ENCOUNTER — OFFICE VISIT (OUTPATIENT)
Dept: FAMILY MEDICINE CLINIC | Facility: CLINIC | Age: 85
End: 2020-05-11

## 2020-05-11 VITALS
SYSTOLIC BLOOD PRESSURE: 144 MMHG | HEART RATE: 108 BPM | HEIGHT: 70 IN | WEIGHT: 175.6 LBS | BODY MASS INDEX: 25.14 KG/M2 | OXYGEN SATURATION: 98 % | TEMPERATURE: 98.1 F | DIASTOLIC BLOOD PRESSURE: 66 MMHG

## 2020-05-11 DIAGNOSIS — R41.89 COGNITIVE DECLINE: ICD-10-CM

## 2020-05-11 PROCEDURE — 99213 OFFICE O/P EST LOW 20 MIN: CPT | Performed by: FAMILY MEDICINE

## 2020-05-11 RX ORDER — DONEPEZIL HYDROCHLORIDE 10 MG/1
10 TABLET, ORALLY DISINTEGRATING ORAL DAILY
Qty: 90 TABLET | Refills: 1 | Status: SHIPPED | OUTPATIENT
Start: 2020-05-11 | End: 2020-05-14 | Stop reason: RX

## 2020-05-11 NOTE — PROGRESS NOTES
Subjective   Clinton Ramirez is a 85 y.o. male.     History of Present Illness follow-up regarding the cognitive decline.  Initiation of new medicine.  Globally doing relatively well.  Does not feel like adverse reaction to the Aricept.  Maintaining self social distancing.  Utilizing a mask.  Denies recent acute illness or exposures.  Denies respiratory CDV  GI changes.    The following portions of the patient's history were reviewed and updated as appropriate: allergies, past family history, past medical history, past social history, past surgical history and problem list.    Review of Systems  See history of Present Illness     Objective     Physical Exam   Constitutional: He is oriented to person, place, and time. He appears well-developed and well-nourished.   HENT:   Head: Normocephalic.   Neurological: He is alert and oriented to person, place, and time.   Psychiatric: He has a normal mood and affect.   Vitals reviewed.      PHQ-9 Total Score:      Patient's Body mass index is 25.2 kg/m². BMI is above normal parameters. Recommendations include: exercise counseling and nutrition counseling.   (Normal BMI:  18.5-24.9, OW 25-29.9, Obesity 30 or greater)      Assessment/Plan     Clinton was seen today for essential hypertension and hyperlipidemia.    Diagnoses and all orders for this visit:    Cognitive decline  -     donepezil ODT (ARICEPT ODT) 10 MG disintegrating tablet; Place 1 tablet on the tongue Daily.    Overall you seem to be weathering this pandemic okay.  In regards to the cognitive decline will increase the Aricept to 10 mg daily.  Encourage maintaining good social distancing stay as safely active as possible.  Maintain reasonable heart healthy diet.  Continue other medications same.  We will ask you to recheck in about 4 months or as needed.                   This document has been electronically signed by Nolberto Navarro MD   May 11, 2020 10:43

## 2020-05-14 RX ORDER — DONEPEZIL HYDROCHLORIDE 10 MG/1
10 TABLET, FILM COATED ORAL NIGHTLY
Qty: 90 TABLET | Refills: 1 | Status: SHIPPED | OUTPATIENT
Start: 2020-05-14 | End: 2020-10-13

## 2020-07-09 RX ORDER — ATORVASTATIN CALCIUM 40 MG/1
40 TABLET, FILM COATED ORAL DAILY
Qty: 90 TABLET | Refills: 3 | Status: SHIPPED | OUTPATIENT
Start: 2020-07-09 | End: 2021-05-27

## 2020-07-09 NOTE — TELEPHONE ENCOUNTER
Caller: AshleyClinton    Relationship: Self    Best call back number:[phone]    Medication needed:   Requested Prescriptions     Pending Prescriptions Disp Refills   • atorvastatin (LIPITOR) 40 MG tablet 90 tablet 3     Sig: Take 1 tablet by mouth Daily.       When do you need the refill by:ASAP    What details did the patient provide when requesting the medication: HE HAS A LITTLE UNDER A WEEK SUPPLY LEFT    Does the patient have less than a 3 day supply:  [] Yes  [x] No    What is the patient's preferred pharmacy: [facility] - [address] [phone] [facility] [phone]

## 2020-07-15 ENCOUNTER — TELEPHONE (OUTPATIENT)
Dept: FAMILY MEDICINE CLINIC | Facility: CLINIC | Age: 85
End: 2020-07-15

## 2020-07-15 NOTE — TELEPHONE ENCOUNTER
PT CALLED AND STATED PHARMACY NEEDED A NEW SCRIPT SENT IN FOR:  atorvastatin (LIPITOR) 40 MG tablet    [facility] - [place], [place] [address] [phone] [facility] [phone] FX

## 2020-08-24 ENCOUNTER — OFFICE VISIT (OUTPATIENT)
Dept: CARDIOLOGY | Facility: CLINIC | Age: 85
End: 2020-08-24

## 2020-08-24 VITALS
SYSTOLIC BLOOD PRESSURE: 122 MMHG | OXYGEN SATURATION: 98 % | HEART RATE: 58 BPM | WEIGHT: 176 LBS | BODY MASS INDEX: 25.2 KG/M2 | TEMPERATURE: 98.5 F | HEIGHT: 70 IN | DIASTOLIC BLOOD PRESSURE: 84 MMHG

## 2020-08-24 DIAGNOSIS — R00.1 BRADYCARDIA, SINUS: ICD-10-CM

## 2020-08-24 DIAGNOSIS — R00.2 PALPITATIONS: Primary | ICD-10-CM

## 2020-08-24 DIAGNOSIS — E78.00 HYPERCHOLESTEROLEMIA: ICD-10-CM

## 2020-08-24 DIAGNOSIS — I10 ESSENTIAL HYPERTENSION: ICD-10-CM

## 2020-08-24 DIAGNOSIS — I45.10 RIGHT BUNDLE BRANCH BLOCK: ICD-10-CM

## 2020-08-24 DIAGNOSIS — I38 VALVULAR HEART DISEASE: ICD-10-CM

## 2020-08-24 PROCEDURE — 93000 ELECTROCARDIOGRAM COMPLETE: CPT | Performed by: INTERNAL MEDICINE

## 2020-08-24 PROCEDURE — 99214 OFFICE O/P EST MOD 30 MIN: CPT | Performed by: INTERNAL MEDICINE

## 2020-08-24 NOTE — PROGRESS NOTES
subjective     Chief Complaint   Patient presents with   • Coronary Artery Disease     Follow up, pt states he is doing well   • Slow Heart Rate     Follow up     History of Present Illness    Patient is 85 years old elderly gentleman who seems to be doing very well.  He is here for cardiology follow-up.  Patient has no new complaints no chest pain palpitations or shortness of breath.  He has nonobstructive coronary artery disease denies any angina type symptoms.  He denies any dizziness syncope or presyncope.  He has history of slow heart rate but has no complaints.  Despite slow heart rate he is taking low-dose metoprolol because of palpitations.  He also has high blood pressure and is taking Plendil 5 mg daily.  Hyperlipidemia is controlled with Lipitor 40 mg daily.  He also is taking aspirin 81 daily.  Patient has mild to moderate aortic stenosis.  Past Surgical History:   Procedure Laterality Date   • BIOPSY PROSTATE NEEDLE / PUNCH / INCISIONAL     • CARDIAC CATHETERIZATION N/A 7/11/2018    Procedure: Left Heart Cath;  Surgeon: Claire Scott MD;  Location: [facility] CATH INVASIVE LOCATION;  Service: Cardiovascular   • COLONOSCOPY     • EAR BIOPSY      basal cell skin cancer   • SKIN CANCER EXCISION      melanoma from neck      Family History   Problem Relation Age of Onset   • Cancer Sister    • Tuberculosis Other    • Diabetes Other    • Heart disease Father      Past Medical History:   Diagnosis Date   • Arthritis     hands   • Elevated cholesterol    • Hypertension    • Hypokalemia    • Multiple benign polyps of large intestine    • Need for prophylactic vaccination and inoculation against influenza    • Pancreatitis    • Paralytic ileus (CMS/HCC)    • PPD positive     negative chest xray    • Renal insufficiency     mild-told to stop Naproxen   • Rheumatic fever     age 20 while in -hospitalized    • Skin cancer     basal cell from ears, melanoma from neck area    • Valvular disease    • Wears glasses     • Wears partial dentures     upper      Patient Active Problem List   Diagnosis   • Bone spur   • Cervical radiculopathy   • Elevated prostate specific antigen (PSA)   • Hypercholesterolemia   • Essential hypertension   • Impaired fasting glucose   • Osteoarthritis   • Recent skin changes   • Tinnitus   • Heart murmur   • Valvular heart disease, mild to moderate AS   • Angina pectoris (CMS/HCC)   • Dyspnea on exertion   • Right bundle branch block   • Sinus arrhythmia   • Abnormal stress test   • History of kidney disease   • Coronary artery disease, 50% mid circumflex lesion and nonobstructive PDA lesion   • Bradycardia, sinus   • Palpitations       Social History     Tobacco Use   • Smoking status: Former Smoker     Packs/day: 3.00     Years: 11.00     Pack years: 33.00     Last attempt to quit:      Years since quittin.6   • Smokeless tobacco: Never Used   Substance Use Topics   • Alcohol use: Yes     Alcohol/week: 7.0 standard drinks     Types: 7 Cans of beer per week     Comment: 1 beer daily sometimes 2    • Drug use: No       No Known Allergies    Current Outpatient Medications on File Prior to Visit   Medication Sig   • aspirin 81 MG chewable tablet Chew 81 mg Every Morning.   • atorvastatin (LIPITOR) 40 MG tablet Take 1 tablet by mouth Daily.   • donepezil (ARICEPT) 10 MG tablet Take 1 tablet by mouth Every Night.   • felodipine (PLENDIL) 5 MG 24 hr tablet Take 1 tablet by mouth Daily.   • ibuprofen (ADVIL,MOTRIN) 200 MG tablet Take 200 mg by mouth Every 6 (Six) Hours As Needed for Mild Pain .   • metoprolol succinate XL (TOPROL-XL) 25 MG 24 hr tablet Take 0.5 tablets by mouth Daily.     No current facility-administered medications on file prior to visit.          The following portions of the patient's history were reviewed and updated as appropriate: allergies, current medications, past family history, past medical history, past social history, past surgical history and problem list.    Review  "of Systems   Constitution: Negative.   HENT: Negative.  Negative for congestion.    Eyes: Negative.    Cardiovascular: Negative.  Negative for chest pain, cyanosis, dyspnea on exertion, irregular heartbeat, leg swelling, near-syncope, orthopnea, palpitations, paroxysmal nocturnal dyspnea and syncope.   Respiratory: Negative.  Negative for shortness of breath.    Hematologic/Lymphatic: Negative.    Musculoskeletal: Negative.    Gastrointestinal: Negative.    Neurological: Negative.  Negative for headaches.          Objective:     /84 (BP Location: Left arm, Patient Position: Sitting, Cuff Size: Adult)   Pulse 58   Temp 98.5 °F (36.9 °C)   Ht 177.8 cm (70\")   Wt 79.8 kg (176 lb)   SpO2 98%   BMI 25.25 kg/m²   Physical Exam   Constitutional: He appears well-developed and well-nourished. No distress.   HENT:   Head: Normocephalic and atraumatic.   Mouth/Throat: Oropharynx is clear and moist. No oropharyngeal exudate.   Eyes: Pupils are equal, round, and reactive to light. Conjunctivae and EOM are normal. No scleral icterus.   Neck: Normal range of motion. Neck supple. No JVD present. No tracheal deviation present. No thyromegaly present.   Cardiovascular: Normal heart sounds and intact distal pulses. An irregular rhythm present. Bradycardia present. PMI is not displaced. Exam reveals no gallop, no friction rub and no decreased pulses.   No murmur heard.  Pulses:       Carotid pulses are 3+ on the right side, and 3+ on the left side.       Radial pulses are 3+ on the right side, and 3+ on the left side.   Pulmonary/Chest: Effort normal and breath sounds normal. No respiratory distress. He has no wheezes. He has no rales. He exhibits no tenderness.   Abdominal: Soft. Bowel sounds are normal. He exhibits no distension, no abdominal bruit and no mass. There is no splenomegaly or hepatomegaly. There is no tenderness. There is no rebound and no guarding.   Musculoskeletal: Normal range of motion. He exhibits no " edema, tenderness or deformity.   Lymphadenopathy:     He has no cervical adenopathy.   Neurological: He is alert. He has normal reflexes. No cranial nerve deficit. He exhibits normal muscle tone. Coordination normal.   Skin: Skin is warm and dry. No rash noted. He is not diaphoretic. No erythema.   Psychiatric: He has a normal mood and affect. His behavior is normal. Judgment and thought content normal.         Lab Review  Lab Results   Component Value Date     03/12/2020    K 4.2 03/12/2020     03/12/2020    BUN 13 03/12/2020    CREATININE 1.12 03/12/2020    GLUCOSE 104 (H) 03/12/2020    CALCIUM 9.1 03/12/2020    ALT 20 03/12/2020    ALKPHOS 89 03/12/2020    LABIL2 1.3 (L) 05/12/2016     Lab Results   Component Value Date    CKTOTAL 43 03/07/2019     Lab Results   Component Value Date    WBC 8.74 03/12/2020    HGB 14.9 03/12/2020    HCT 42.3 03/12/2020     03/12/2020     No results found for: INR  No results found for: MG  Lab Results   Component Value Date    TSH 4.330 (H) 09/12/2019     No results found for: BNP  Lab Results   Component Value Date    CHLPL 166 05/12/2016    CHOL 147 03/12/2020    TRIG 122 03/12/2020    HDL 46 03/12/2020    VLDL 24.4 03/12/2020    LDLHDL 1.67 03/12/2020     Lab Results   Component Value Date    LDL 77 03/12/2020    LDL 63 09/12/2019         ECG 12 Lead  Date/Time: 8/24/2020 12:48 PM  Performed by: Sandra Torres MD  Authorized by: Sandra Torres MD   Comparison: compared with previous ECG from 2/24/2020  Comparison to previous ECG: PACs are new  Rhythm: sinus bradycardia  Ectopy: atrial premature contractions  BPM: 50  Conduction: right bundle branch block  Other findings: non-specific ST-T wave changes    Clinical impression: abnormal EKG               I personally viewed and interpreted the patient's LAB data         Assessment:     1. Palpitations    2. Hypercholesterolemia    3. Essential hypertension    4. Valvular heart disease, mild to  moderate AS    5. Right bundle branch block    6. Bradycardia, sinus          Plan:     Patient seems be doing very well he denies any chest pain.  He also has heart skipping but no fast heartbeat.  There has been no evidence of atrial fibrillation.  Blood pressure is very well controlled.  He was advised to continue Plendil for hypertension he will continue Lipitor for hyperlipidemia  Aspirin was continued because of nonobstructive coronary artery disease.  EKG does not show any acute changes patient has frequent PACs otherwise sinus bradycardia and right bundle branch block is noted which is unchanged.  Patient was advised to let us know if he has any symptoms of dizziness syncope presyncope or chest discomfort.  Follow-up scheduled          No follow-ups on file.

## 2020-09-17 ENCOUNTER — OFFICE VISIT (OUTPATIENT)
Dept: FAMILY MEDICINE CLINIC | Facility: CLINIC | Age: 85
End: 2020-09-17

## 2020-09-17 VITALS
HEIGHT: 70 IN | BODY MASS INDEX: 25.34 KG/M2 | SYSTOLIC BLOOD PRESSURE: 120 MMHG | DIASTOLIC BLOOD PRESSURE: 58 MMHG | OXYGEN SATURATION: 97 % | HEART RATE: 52 BPM | WEIGHT: 177 LBS | RESPIRATION RATE: 18 BRPM | TEMPERATURE: 97.3 F

## 2020-09-17 DIAGNOSIS — I10 ESSENTIAL HYPERTENSION: Primary | ICD-10-CM

## 2020-09-17 DIAGNOSIS — E78.00 HYPERCHOLESTEROLEMIA: ICD-10-CM

## 2020-09-17 DIAGNOSIS — E03.8 SUBCLINICAL HYPOTHYROIDISM: ICD-10-CM

## 2020-09-17 DIAGNOSIS — I38 VALVULAR HEART DISEASE: ICD-10-CM

## 2020-09-17 PROCEDURE — 80053 COMPREHEN METABOLIC PANEL: CPT | Performed by: FAMILY MEDICINE

## 2020-09-17 PROCEDURE — 99213 OFFICE O/P EST LOW 20 MIN: CPT | Performed by: FAMILY MEDICINE

## 2020-09-17 PROCEDURE — 84439 ASSAY OF FREE THYROXINE: CPT | Performed by: FAMILY MEDICINE

## 2020-09-17 PROCEDURE — 36415 COLL VENOUS BLD VENIPUNCTURE: CPT | Performed by: FAMILY MEDICINE

## 2020-09-17 PROCEDURE — 84443 ASSAY THYROID STIM HORMONE: CPT | Performed by: FAMILY MEDICINE

## 2020-09-17 PROCEDURE — 80061 LIPID PANEL: CPT | Performed by: FAMILY MEDICINE

## 2020-09-17 PROCEDURE — 85025 COMPLETE CBC W/AUTO DIFF WBC: CPT | Performed by: FAMILY MEDICINE

## 2020-09-17 NOTE — PROGRESS NOTES
Subjective   Clinton Ramirez is a 85 y.o. male.     History of Present Illness follow-up regarding hypertension valvular heart disease.  Cognitive impairment.  Tolerating medications as reconciled.  Has followed with cardiology.  Globally energy is fair appetite is reasonable sleep pattern stable active.  Denies respiratory CDV  GI orthopedic skin concerns.  Feels the Aricept is giving some benefit subjectively.    The following portions of the patient's history were reviewed and updated as appropriate: allergies, past family history, past medical history, past social history, past surgical history and problem list.    Review of Systems  See history of Present Illness     Objective     Physical Exam  Vitals signs reviewed.   Constitutional:       Appearance: He is well-developed.   HENT:      Head: Normocephalic.      Right Ear: External ear normal.      Left Ear: External ear normal.   Eyes:      Conjunctiva/sclera: Conjunctivae normal.      Pupils: Pupils are equal, round, and reactive to light.   Neck:      Musculoskeletal: Normal range of motion and neck supple.      Thyroid: No thyromegaly.      Trachea: No tracheal deviation.   Cardiovascular:      Rate and Rhythm: Normal rate and regular rhythm.      Heart sounds: Normal heart sounds. No murmur.   Pulmonary:      Effort: Pulmonary effort is normal.      Breath sounds: Normal breath sounds.   Musculoskeletal:      Right lower leg: No edema.      Left lower leg: No edema.   Lymphadenopathy:      Cervical: No cervical adenopathy.   Skin:     General: Skin is warm and dry.   Neurological:      Mental Status: He is alert and oriented to person, place, and time. Mental status is at baseline.   Psychiatric:         Mood and Affect: Mood normal.         PHQ-9 Total Score:      Patient's Body mass index is 25.4 kg/m². BMI is above normal parameters. Recommendations include: exercise counseling and nutrition counseling.   (Normal BMI:  18.5-24.9, OW 25-29.9, Obesity  30 or greater)      Assessment/Plan     Clinton was seen today for hypertension.    Diagnoses and all orders for this visit:    Essential hypertension  -     Comprehensive Metabolic Panel  -     CBC & Differential  -     Lipid Panel  -     CBC Auto Differential    Valvular heart disease, mild to moderate AS    Subclinical hypothyroidism  -     TSH  -     T4, Free    Hypercholesterolemia  -     Comprehensive Metabolic Panel  -     Lipid Panel    Stable.  Check labs.  Continue medications same.  Flu vaccine soon.  Recheck in a few months or as needed.  Maintain pandemic response.                     This document has been electronically signed by Nolberto Navarro MD   September 17, 2020 10:46 EDT

## 2020-09-18 LAB
ALBUMIN SERPL-MCNC: 4.3 G/DL (ref 3.5–5.2)
ALBUMIN/GLOB SERPL: 1.2 G/DL
ALP SERPL-CCNC: 104 U/L (ref 39–117)
ALT SERPL W P-5'-P-CCNC: 17 U/L (ref 1–41)
ANION GAP SERPL CALCULATED.3IONS-SCNC: 9.5 MMOL/L (ref 5–15)
AST SERPL-CCNC: 21 U/L (ref 1–40)
BASOPHILS # BLD AUTO: 0.06 10*3/MM3 (ref 0–0.2)
BASOPHILS NFR BLD AUTO: 0.7 % (ref 0–1.5)
BILIRUB SERPL-MCNC: 0.7 MG/DL (ref 0–1.2)
BUN SERPL-MCNC: 12 MG/DL (ref 8–23)
BUN/CREAT SERPL: 10.2 (ref 7–25)
CALCIUM SPEC-SCNC: 9.1 MG/DL (ref 8.6–10.5)
CHLORIDE SERPL-SCNC: 103 MMOL/L (ref 98–107)
CHOLEST SERPL-MCNC: 141 MG/DL (ref 0–200)
CO2 SERPL-SCNC: 25.5 MMOL/L (ref 22–29)
CREAT SERPL-MCNC: 1.18 MG/DL (ref 0.76–1.27)
DEPRECATED RDW RBC AUTO: 40.6 FL (ref 37–54)
EOSINOPHIL # BLD AUTO: 0.19 10*3/MM3 (ref 0–0.4)
EOSINOPHIL NFR BLD AUTO: 2.2 % (ref 0.3–6.2)
ERYTHROCYTE [DISTWIDTH] IN BLOOD BY AUTOMATED COUNT: 12.3 % (ref 12.3–15.4)
GFR SERPL CREATININE-BSD FRML MDRD: 59 ML/MIN/1.73
GLOBULIN UR ELPH-MCNC: 3.5 GM/DL
GLUCOSE SERPL-MCNC: 103 MG/DL (ref 65–99)
HCT VFR BLD AUTO: 45.1 % (ref 37.5–51)
HDLC SERPL-MCNC: 42 MG/DL (ref 40–60)
HGB BLD-MCNC: 15.7 G/DL (ref 13–17.7)
IMM GRANULOCYTES # BLD AUTO: 0.02 10*3/MM3 (ref 0–0.05)
IMM GRANULOCYTES NFR BLD AUTO: 0.2 % (ref 0–0.5)
LDLC SERPL CALC-MCNC: 74 MG/DL (ref 0–100)
LDLC/HDLC SERPL: 1.77 {RATIO}
LYMPHOCYTES # BLD AUTO: 2.33 10*3/MM3 (ref 0.7–3.1)
LYMPHOCYTES NFR BLD AUTO: 26.6 % (ref 19.6–45.3)
MCH RBC QN AUTO: 31.1 PG (ref 26.6–33)
MCHC RBC AUTO-ENTMCNC: 34.8 G/DL (ref 31.5–35.7)
MCV RBC AUTO: 89.3 FL (ref 79–97)
MONOCYTES # BLD AUTO: 0.79 10*3/MM3 (ref 0.1–0.9)
MONOCYTES NFR BLD AUTO: 9 % (ref 5–12)
NEUTROPHILS NFR BLD AUTO: 5.37 10*3/MM3 (ref 1.7–7)
NEUTROPHILS NFR BLD AUTO: 61.3 % (ref 42.7–76)
NRBC BLD AUTO-RTO: 0 /100 WBC (ref 0–0.2)
PLATELET # BLD AUTO: 240 10*3/MM3 (ref 140–450)
PMV BLD AUTO: 12.2 FL (ref 6–12)
POTASSIUM SERPL-SCNC: 4.7 MMOL/L (ref 3.5–5.2)
PROT SERPL-MCNC: 7.8 G/DL (ref 6–8.5)
RBC # BLD AUTO: 5.05 10*6/MM3 (ref 4.14–5.8)
SODIUM SERPL-SCNC: 138 MMOL/L (ref 136–145)
T4 FREE SERPL-MCNC: 1.32 NG/DL (ref 0.93–1.7)
TRIGL SERPL-MCNC: 124 MG/DL (ref 0–150)
TSH SERPL DL<=0.05 MIU/L-ACNC: 3.74 UIU/ML (ref 0.27–4.2)
VLDLC SERPL-MCNC: 24.8 MG/DL (ref 5–40)
WBC # BLD AUTO: 8.76 10*3/MM3 (ref 3.4–10.8)

## 2020-10-07 ENCOUNTER — FLU SHOT (OUTPATIENT)
Dept: FAMILY MEDICINE CLINIC | Facility: CLINIC | Age: 85
End: 2020-10-07

## 2020-10-07 DIAGNOSIS — Z23 NEED FOR INFLUENZA VACCINATION: ICD-10-CM

## 2020-10-07 PROCEDURE — 90694 VACC AIIV4 NO PRSRV 0.5ML IM: CPT | Performed by: NURSE PRACTITIONER

## 2020-10-07 PROCEDURE — G0008 ADMIN INFLUENZA VIRUS VAC: HCPCS | Performed by: NURSE PRACTITIONER

## 2020-10-13 RX ORDER — DONEPEZIL HYDROCHLORIDE 10 MG/1
TABLET, FILM COATED ORAL
Qty: 90 TABLET | Refills: 3 | Status: SHIPPED | OUTPATIENT
Start: 2020-10-13 | End: 2021-09-07 | Stop reason: SDUPTHER

## 2020-12-21 ENCOUNTER — OFFICE VISIT (OUTPATIENT)
Dept: FAMILY MEDICINE CLINIC | Facility: CLINIC | Age: 85
End: 2020-12-21

## 2020-12-21 VITALS
BODY MASS INDEX: 25.77 KG/M2 | TEMPERATURE: 97.7 F | HEART RATE: 51 BPM | DIASTOLIC BLOOD PRESSURE: 80 MMHG | WEIGHT: 180 LBS | OXYGEN SATURATION: 98 % | SYSTOLIC BLOOD PRESSURE: 140 MMHG | RESPIRATION RATE: 18 BRPM | HEIGHT: 70 IN

## 2020-12-21 DIAGNOSIS — I10 ESSENTIAL HYPERTENSION: Primary | ICD-10-CM

## 2020-12-21 DIAGNOSIS — I38 VALVULAR HEART DISEASE: ICD-10-CM

## 2020-12-21 PROCEDURE — 99213 OFFICE O/P EST LOW 20 MIN: CPT | Performed by: FAMILY MEDICINE

## 2020-12-21 RX ORDER — METOPROLOL SUCCINATE 25 MG/1
12.5 TABLET, EXTENDED RELEASE ORAL DAILY
Qty: 90 TABLET | Refills: 3 | Status: SHIPPED | OUTPATIENT
Start: 2020-12-21 | End: 2021-08-24

## 2020-12-21 NOTE — PROGRESS NOTES
Subjective   Clinton Ramirez is a 85 y.o. male.     History of Present Illness follow-up regarding chronic medical conditions including hypertension dyslipidemia.  Memory challenges are stable.  Is followed with cardiology.  Utilizing medications as reconciled.  Has had no significant recent changes.  Maintaining pandemic response.  Denies respiratory CDV GI  concerns.  Trying to stay active.  Has visited dermatology.  Some procedures upcoming.    The following portions of the patient's history were reviewed and updated as appropriate: allergies, past family history, past medical history, past social history, past surgical history and problem list.    Review of Systems  See history of Present Illness     Objective     Physical Exam  Vitals signs reviewed.   Constitutional:       Appearance: Normal appearance.   HENT:      Head: Normocephalic.   Eyes:      Conjunctiva/sclera: Conjunctivae normal.   Neck:      Musculoskeletal: Normal range of motion and neck supple.   Cardiovascular:      Rate and Rhythm: Normal rate and regular rhythm.      Heart sounds: Murmur present.   Pulmonary:      Effort: Pulmonary effort is normal.      Breath sounds: Normal breath sounds.   Skin:     General: Skin is warm and dry.   Neurological:      Mental Status: He is alert and oriented to person, place, and time.   Psychiatric:         Mood and Affect: Mood normal.         Thought Content: Thought content normal.         PHQ-9 Total Score:      Patient's Body mass index is 25.83 kg/m². BMI is above normal parameters. Recommendations include: exercise counseling and nutrition counseling.   (Normal BMI:  18.5-24.9, OW 25-29.9, Obesity 30 or greater)      Assessment/Plan     Diagnoses and all orders for this visit:    1. Essential hypertension (Primary)  -     metoprolol succinate XL (TOPROL-XL) 25 MG 24 hr tablet; Take 0.5 tablets by mouth Daily.  Dispense: 90 tablet; Refill: 3    2. Valvular heart disease, mild to moderate AS    Stable.   Meds same.  Maintain pandemic response.  Recheck here in about 4 months.  Keep follow-ups elsewhere.                     This document has been electronically signed by Nolberto Navarro MD   December 21, 2020 12:26 EST    Part of this note may be an electronic transcription/translation of spoken language to printed text using the Dragon Dictation System.

## 2021-02-04 ENCOUNTER — IMMUNIZATION (OUTPATIENT)
Dept: VACCINE CLINIC | Facility: HOSPITAL | Age: 86
End: 2021-02-04

## 2021-02-04 PROCEDURE — 91300 HC SARSCOV02 VAC 30MCG/0.3ML IM: CPT | Performed by: INTERNAL MEDICINE

## 2021-02-04 PROCEDURE — 0001A: CPT | Performed by: INTERNAL MEDICINE

## 2021-02-23 ENCOUNTER — OFFICE VISIT (OUTPATIENT)
Dept: CARDIOLOGY | Facility: CLINIC | Age: 86
End: 2021-02-23

## 2021-02-23 VITALS
DIASTOLIC BLOOD PRESSURE: 84 MMHG | HEIGHT: 70 IN | HEART RATE: 56 BPM | OXYGEN SATURATION: 99 % | TEMPERATURE: 97.9 F | WEIGHT: 178 LBS | BODY MASS INDEX: 25.48 KG/M2 | SYSTOLIC BLOOD PRESSURE: 150 MMHG

## 2021-02-23 DIAGNOSIS — R00.1 BRADYCARDIA, SINUS: ICD-10-CM

## 2021-02-23 DIAGNOSIS — I10 ESSENTIAL HYPERTENSION: ICD-10-CM

## 2021-02-23 DIAGNOSIS — E78.00 HYPERCHOLESTEROLEMIA: ICD-10-CM

## 2021-02-23 DIAGNOSIS — I25.10 CORONARY ARTERY DISEASE INVOLVING NATIVE CORONARY ARTERY OF NATIVE HEART WITHOUT ANGINA PECTORIS: Primary | ICD-10-CM

## 2021-02-23 DIAGNOSIS — I38 VALVULAR HEART DISEASE: ICD-10-CM

## 2021-02-23 PROCEDURE — 99214 OFFICE O/P EST MOD 30 MIN: CPT | Performed by: INTERNAL MEDICINE

## 2021-02-23 NOTE — PROGRESS NOTES
subjective     Chief Complaint   Patient presents with   • Hypertension     Follow up   • Coronary Artery Disease     Follow up   • Palpitations     Follow up, pt denies any symptoms, no soa,      History of Present Illness  Patient is 85 years old white male who is here for cardiology follow-up.  Patient has essential hypertension he has been taking Plendil 5 mg daily.  Blood pressure is elevated today, he is also taking Toprol-XL 12.5 mg daily    Patient has nonobstructive coronary artery disease.  He is taking baby aspirin 81 mg daily, beta-blocker therapy low-dose and statin therapy with Lipitor.  He has no complaints today and is doing very well.    He is tolerating Toprol-XL 12.5 mg daily he does have bradycardia but is asymptomatic.  Hyperlipidemia on Lipitor 40 mg daily without any drug side effects.  And patient is quite active and is relatively asymptomatic.    He also has history of mild to moderate aortic stenosis clinically no evidence of heart failure patient is asymptomatic    Past Surgical History:   Procedure Laterality Date   • BIOPSY PROSTATE NEEDLE / PUNCH / INCISIONAL     • CARDIAC CATHETERIZATION N/A 7/11/2018    Procedure: Left Heart Cath;  Surgeon: Claire Scott MD;  Location: [facility] CATH INVASIVE LOCATION;  Service: Cardiovascular   • COLONOSCOPY     • EAR BIOPSY      basal cell skin cancer   • SKIN CANCER EXCISION      melanoma from neck      Family History   Problem Relation Age of Onset   • Cancer Sister    • Tuberculosis Other    • Diabetes Other    • Heart disease Father      Past Medical History:   Diagnosis Date   • Arthritis     hands   • Elevated cholesterol    • Hypertension    • Hypokalemia    • Multiple benign polyps of large intestine    • Need for prophylactic vaccination and inoculation against influenza    • Pancreatitis    • Paralytic ileus (CMS/HCC)    • PPD positive     negative chest xray    • Renal insufficiency     mild-told to stop Naproxen   • Rheumatic fever     age  20 while in -hospitalized    • Skin cancer     basal cell from ears, melanoma from neck area    • Valvular disease    • Wears glasses    • Wears partial dentures     upper      Patient Active Problem List   Diagnosis   • Bone spur   • Cervical radiculopathy   • Elevated prostate specific antigen (PSA)   • Hypercholesterolemia   • Essential hypertension   • Impaired fasting glucose   • Osteoarthritis   • Recent skin changes   • Tinnitus   • Heart murmur   • Valvular heart disease, mild to moderate AS   • Angina pectoris (CMS/HCC)   • Dyspnea on exertion   • Right bundle branch block   • Sinus arrhythmia   • Abnormal stress test   • History of kidney disease   • Coronary artery disease, 50% mid circumflex lesion and nonobstructive PDA lesion   • Bradycardia, sinus   • Palpitations       Social History     Tobacco Use   • Smoking status: Former Smoker     Packs/day: 3.00     Years: 11.00     Pack years: 33.00     Quit date:      Years since quittin.1   • Smokeless tobacco: Never Used   Substance Use Topics   • Alcohol use: Yes     Alcohol/week: 7.0 standard drinks     Types: 7 Cans of beer per week     Comment: 1 beer daily sometimes 2    • Drug use: No       No Known Allergies    Current Outpatient Medications on File Prior to Visit   Medication Sig   • aspirin 81 MG chewable tablet Chew 81 mg Every Morning.   • atorvastatin (LIPITOR) 40 MG tablet Take 1 tablet by mouth Daily.   • donepezil (ARICEPT) 10 MG tablet TAKE 1 TABLET EVERY NIGHT   • felodipine (PLENDIL) 5 MG 24 hr tablet Take 1 tablet by mouth Daily.   • ibuprofen (ADVIL,MOTRIN) 200 MG tablet Take 200 mg by mouth Every 6 (Six) Hours As Needed for Mild Pain .   • metoprolol succinate XL (TOPROL-XL) 25 MG 24 hr tablet Take 0.5 tablets by mouth Daily.     No current facility-administered medications on file prior to visit.          The following portions of the patient's history were reviewed and updated as appropriate: allergies, current  "medications, past family history, past medical history, past social history, past surgical history and problem list.    Review of Systems   Constitution: Negative.   HENT: Negative.  Negative for congestion.    Eyes: Negative.    Cardiovascular: Negative.  Negative for chest pain, cyanosis, dyspnea on exertion, irregular heartbeat, leg swelling, near-syncope, orthopnea, palpitations, paroxysmal nocturnal dyspnea and syncope.   Respiratory: Negative.  Negative for shortness of breath.    Hematologic/Lymphatic: Negative.    Musculoskeletal: Negative.    Gastrointestinal: Negative.    Neurological: Negative.  Negative for headaches.          Objective:     /84 (BP Location: Left arm, Patient Position: Sitting, Cuff Size: Adult)   Pulse 56   Temp 97.9 °F (36.6 °C)   Ht 177.8 cm (70\")   Wt 80.7 kg (178 lb)   SpO2 99%   BMI 25.54 kg/m²   Vitals signs and nursing note reviewed.   Cardiovascular:      PMI at left midclavicular line. Normal rate. Regular rhythm. Normal S1. Normal S2.      Murmurs: There is a grade 2/6 systolic murmur at the ULSB.      No gallop. No click. No rub.   Pulses:     Intact distal pulses.   Edema:     Peripheral edema absent.           Lab Review  Lab Results   Component Value Date     09/17/2020    K 4.7 09/17/2020     09/17/2020    BUN 12 09/17/2020    CREATININE 1.18 09/17/2020    GLUCOSE 103 (H) 09/17/2020    CALCIUM 9.1 09/17/2020    ALT 17 09/17/2020    ALKPHOS 104 09/17/2020    LABIL2 1.3 (L) 05/12/2016     Lab Results   Component Value Date    CKTOTAL 43 03/07/2019     Lab Results   Component Value Date    WBC 8.76 09/17/2020    HGB 15.7 09/17/2020    HCT 45.1 09/17/2020     09/17/2020     No results found for: INR  No results found for: MG  Lab Results   Component Value Date    TSH 3.740 09/17/2020     No results found for: BNP  Lab Results   Component Value Date    CHLPL 166 05/12/2016    CHOL 141 09/17/2020    TRIG 124 09/17/2020    HDL 42 09/17/2020    " VLDL 24.8 09/17/2020    LDLHDL 1.77 09/17/2020     Lab Results   Component Value Date    LDL 74 09/17/2020    LDL 77 03/12/2020       Procedures  Interpretation Summary Holter monitor February 28, 2020    · A relatively benign monitor study.  · The patient was monitored for 23 hours and 59 minutes.  · The predominant rhythm noted during the testing period was sinus rhythm with RBBB.  · Total beats: 78468. Average HR: 65. Min HR: 43. Max HR: 107.  · Premature atrial contractions occured occasionally.  · 2 atrial 3 beat runs and multiple atrial pairs noted.  · No atrial fibrillation noted.  · No pause,     Interpretation Summary echocardiogram February 28, 2020    · Normal left ventricular cavity size and wall thickness noted.  · Left ventricular systolic function is normal. Estimated EF appears to be in the range of 56 - 60%.  · Left ventricular diastolic dysfunction (grade I) consistent with impaired relaxation.  · There is moderate calcification of the aortic valve with mild aortic valve stenosis, CORIN(I,D) 1.8 cm^2.  · Moderate MAC is present. There is bileaflet mitral valve thickening present. No mitral valve regurgitation is present. No significant mitral valve stenosis is present.  · Mild tricuspid valve regurgitation is present with moderate pulmonary hypertension is present (50 mm Hg).  · Left atrial cavity size is mildly dilated.  · There is no evidence of pericardial effusion.             I personally viewed and interpreted the patient's LAB data         Assessment:     1. Coronary artery disease involving native coronary artery of native heart without angina pectoris    2. Bradycardia, sinus    3. Essential hypertension    4. Valvular heart disease, mild to moderate AS    5. Hypercholesterolemia          Plan:     Patient is 85 years old white male with history of nonobstructive coronary artery disease.  He is doing very well denies any chest pain palpitations or shortness of breath.  Patient was advised to  continue baby aspirin along with Toprol-XL 12.5 mg daily and statin therapy.      Blood pressure is elevated today he will take Plendil 7.5 mg daily and check blood pressure closely.  Patient has bradycardia but is asymptomatic Toprol-XL 12.5 was continued.  Patient also has mild to moderate aortic stenosis  He is asymptomatic,  Last echocardiogram about a year ago ,showed normal LV ejection fraction grade 1 LV diastolic dysfunction was noted, mild aortic stenosis aortic valve area of 1.8.  Follow-up scheduled        No follow-ups on file.

## 2021-02-24 DIAGNOSIS — I10 ESSENTIAL HYPERTENSION: ICD-10-CM

## 2021-02-24 RX ORDER — FELODIPINE 5 MG/1
5 TABLET, EXTENDED RELEASE ORAL DAILY
Qty: 90 TABLET | Refills: 3 | Status: SHIPPED | OUTPATIENT
Start: 2021-02-24 | End: 2021-03-02 | Stop reason: SDUPTHER

## 2021-02-24 NOTE — TELEPHONE ENCOUNTER
Caller: Ashley Clinton L    Relationship: Self    Best call back number: [phone]    Medication needed:   Requested Prescriptions     Pending Prescriptions Disp Refills   • felodipine (PLENDIL) 5 MG 24 hr tablet 90 tablet 3     Sig: Take 1 tablet by mouth Daily.       When do you need the refill by: 03/01/21    What details did the patient provide when requesting the medication:  HIS CARDIOLOGIST INCREASED THE DOSAGE TO 1.5 TABS/DAY.    Does the patient have less than a 3 day supply:  [] Yes  [x] No    What is the patient's preferred pharmacy: [facility] - [address] [phone] [facility] [phone]

## 2021-02-25 ENCOUNTER — IMMUNIZATION (OUTPATIENT)
Dept: VACCINE CLINIC | Facility: HOSPITAL | Age: 86
End: 2021-02-25

## 2021-02-25 PROCEDURE — 0002A: CPT | Performed by: INTERNAL MEDICINE

## 2021-02-25 PROCEDURE — 91300 HC SARSCOV02 VAC 30MCG/0.3ML IM: CPT | Performed by: INTERNAL MEDICINE

## 2021-03-02 ENCOUNTER — TELEPHONE (OUTPATIENT)
Dept: FAMILY MEDICINE CLINIC | Facility: CLINIC | Age: 86
End: 2021-03-02

## 2021-03-02 DIAGNOSIS — I10 ESSENTIAL HYPERTENSION: ICD-10-CM

## 2021-03-02 RX ORDER — FELODIPINE 2.5 MG/1
7.5 TABLET, EXTENDED RELEASE ORAL DAILY
Qty: 270 TABLET | Refills: 1 | Status: SHIPPED | OUTPATIENT
Start: 2021-03-02 | End: 2021-04-21

## 2021-03-02 NOTE — TELEPHONE ENCOUNTER
Caller: Clinton Ramirez    Relationship: Self    Best call back number: [phone]    Medication needed:   Requested Prescriptions     Pending Prescriptions Disp Refills   • felodipine (PLENDIL) 5 MG 24 hr tablet 90 tablet 3     Sig: Take 1 tablet by mouth Daily.       When do you need the refill by: 03/03/21    What details did the patient provide when requesting the medication: PATIENT CALLED IN STATED THAT DR ZARAGOZA INCREASED PATIENT MEDICATION TO 1 1/2 DAILY PATIENT WOULD LIKE THIS REFILLED WITH THOSE MG PLEASE ADVISE    Does the patient have less than a 3 day supply:  [] Yes  [x] No    What is the patient's preferred pharmacy: [facility] - [facility] [place] [address] [phone] [facility] [phone]

## 2021-04-21 ENCOUNTER — OFFICE VISIT (OUTPATIENT)
Dept: FAMILY MEDICINE CLINIC | Facility: CLINIC | Age: 86
End: 2021-04-21

## 2021-04-21 VITALS
DIASTOLIC BLOOD PRESSURE: 70 MMHG | BODY MASS INDEX: 26.2 KG/M2 | SYSTOLIC BLOOD PRESSURE: 130 MMHG | WEIGHT: 183 LBS | HEART RATE: 62 BPM | HEIGHT: 70 IN | TEMPERATURE: 97.5 F | RESPIRATION RATE: 18 BRPM | OXYGEN SATURATION: 98 %

## 2021-04-21 DIAGNOSIS — E03.8 SUBCLINICAL HYPOTHYROIDISM: ICD-10-CM

## 2021-04-21 DIAGNOSIS — E78.00 HYPERCHOLESTEROLEMIA: ICD-10-CM

## 2021-04-21 DIAGNOSIS — I10 ESSENTIAL HYPERTENSION: Primary | ICD-10-CM

## 2021-04-21 DIAGNOSIS — R41.3 MEMORY IMPAIRMENT OF GRADUAL ONSET: Chronic | ICD-10-CM

## 2021-04-21 DIAGNOSIS — R73.01 IMPAIRED FASTING GLUCOSE: ICD-10-CM

## 2021-04-21 DIAGNOSIS — Z00.00 MEDICARE ANNUAL WELLNESS VISIT, SUBSEQUENT: ICD-10-CM

## 2021-04-21 PROBLEM — I25.10 CORONARY ARTERY DISEASE: Chronic | Status: ACTIVE | Noted: 2018-10-16

## 2021-04-21 PROBLEM — I38 VALVULAR HEART DISEASE: Chronic | Status: ACTIVE | Noted: 2018-05-29

## 2021-04-21 PROCEDURE — 84443 ASSAY THYROID STIM HORMONE: CPT | Performed by: FAMILY MEDICINE

## 2021-04-21 PROCEDURE — G0439 PPPS, SUBSEQ VISIT: HCPCS | Performed by: FAMILY MEDICINE

## 2021-04-21 PROCEDURE — 80053 COMPREHEN METABOLIC PANEL: CPT | Performed by: FAMILY MEDICINE

## 2021-04-21 PROCEDURE — 83036 HEMOGLOBIN GLYCOSYLATED A1C: CPT | Performed by: FAMILY MEDICINE

## 2021-04-21 PROCEDURE — 80061 LIPID PANEL: CPT | Performed by: FAMILY MEDICINE

## 2021-04-21 PROCEDURE — 36415 COLL VENOUS BLD VENIPUNCTURE: CPT | Performed by: FAMILY MEDICINE

## 2021-04-21 PROCEDURE — 85025 COMPLETE CBC W/AUTO DIFF WBC: CPT | Performed by: FAMILY MEDICINE

## 2021-04-21 RX ORDER — FELODIPINE 2.5 MG/1
2.5 TABLET, EXTENDED RELEASE ORAL DAILY
Qty: 270 TABLET | Refills: 1 | Status: SHIPPED | COMMUNITY
Start: 2021-04-21 | End: 2021-12-28

## 2021-04-21 RX ORDER — LOSARTAN POTASSIUM 25 MG/1
12.5 TABLET ORAL DAILY
Qty: 45 TABLET | Refills: 2 | Status: SHIPPED | OUTPATIENT
Start: 2021-04-21 | End: 2021-11-29 | Stop reason: SDUPTHER

## 2021-04-21 NOTE — PROGRESS NOTES
The ABCs of the Annual Wellness Visit  Subsequent Medicare Wellness Visit    Chief Complaint   Patient presents with   • Medicare Wellness-subsequent       Subjective   History of Present Illness:  Clinton Ramirez is a 86 y.o. male who presents for a Subsequent Medicare Wellness Visit.  Follow-up hypertension.  Has had some slightly worsening bilateral left worse than right lower extremity edema.  Is followed with cardiology.  Medication adjustments noted.  Denies respiratory CDV GI  skin orthopedic concerns.  Pretty good energy.  Stays fairly active.  Reasonable diet.  Wife present.  Feels like the memory impairment is no worse.    HEALTH RISK ASSESSMENT    Recent Hospitalizations:  No hospitalization(s) within the last year.    Current Medical Providers:  Patient Care Team:  Nolberto Navarro MD as PCP - General  Nolberto Navarro MD as PCP - Family Medicine  [facility] ([facility]thopedic Surgery)  Ean Richards MD as Consulting Physician (Urology)  Sandra Torres MD as Consulting Physician (Cardiology)    Smoking Status:  Social History     Tobacco Use   Smoking Status Former Smoker   • Packs/day: 3.00   • Years: 11.00   • Pack years: 33.00   • Quit date:    • Years since quittin.3   Smokeless Tobacco Never Used       Alcohol Consumption:  Social History     Substance and Sexual Activity   Alcohol Use Yes   • Alcohol/week: 7.0 standard drinks   • Types: 7 Cans of beer per week    Comment: 1 beer daily sometimes 2        Depression Screen:   PHQ-2/PHQ-9 Depression Screening 2021   Little interest or pleasure in doing things 0   Feeling down, depressed, or hopeless 0   Trouble falling or staying asleep, or sleeping too much -   Feeling tired or having little energy -   Poor appetite or overeating -   Feeling bad about yourself - or that you are a failure or have let yourself or your family down -   Trouble concentrating on things, such as  reading the newspaper or watching television -   Moving or speaking so slowly that other people could have noticed. Or the opposite - being so fidgety or restless that you have been moving around a lot more than usual -   Thoughts that you would be better off dead, or of hurting yourself in some way -   Total Score 0   If you checked off any problems, how difficult have these problems made it for you to do your work, take care of things at home, or get along with other people? -       Fall Risk Screen:  CUBA Fall Risk Assessment was completed, and patient is at LOW risk for falls.Assessment completed on:4/21/2021    Health Habits and Functional and Cognitive Screening:  Functional & Cognitive Status 4/21/2021   Do you have difficulty preparing food and eating? No   Do you have difficulty bathing yourself, getting dressed or grooming yourself? No   Do you have difficulty using the toilet? No   Do you have difficulty moving around from place to place? No   Do you have trouble with steps or getting out of a bed or a chair? No   Current Diet Well Balanced Diet   Dental Exam Up to date   Eye Exam Up to date   Exercise (times per week) 5 times per week   Current Exercise Activities Include Walking   Do you need help using the phone?  No   Are you deaf or do you have serious difficulty hearing?  No   Do you need help with transportation? No   Do you need help shopping? No   Do you need help preparing meals?  No   Do you need help with housework?  No   Do you need help with laundry? No   Do you need help taking your medications? No   Do you need help managing money? No   Do you ever drive or ride in a car without wearing a seat belt? No   Have you felt unusual stress, anger or loneliness in the last month? No   Who do you live with? Spouse   If you need help, do you have trouble finding someone available to you? No   Have you been bothered in the last four weeks by sexual problems? No   Do you have difficulty  concentrating, remembering or making decisions? Yes         Does the patient have evidence of cognitive impairment? Yes    Asprin use counseling:Taking ASA appropriately as indicated    Age-appropriate Screening Schedule:  Refer to the list below for future screening recommendations based on patient's age, sex and/or medical conditions. Orders for these recommended tests are listed in the plan section. The patient has been provided with a written plan.    Health Maintenance   Topic Date Due   • ZOSTER VACCINE (2 of 3) 03/13/2014   • TDAP/TD VACCINES (2 - Td) 01/01/2015   • INFLUENZA VACCINE  08/01/2021   • LIPID PANEL  09/17/2021          The following portions of the patient's history were reviewed and updated as appropriate: allergies, current medications, past family history, past social history, past surgical history and problem list.    Outpatient Medications Prior to Visit   Medication Sig Dispense Refill   • aspirin 81 MG chewable tablet Chew 81 mg Every Morning.     • atorvastatin (LIPITOR) 40 MG tablet Take 1 tablet by mouth Daily. 90 tablet 3   • donepezil (ARICEPT) 10 MG tablet TAKE 1 TABLET EVERY NIGHT 90 tablet 3   • felodipine (PLENDIL) 2.5 MG 24 hr tablet Take 2 tablets by mouth Daily. 270 tablet 1   • ibuprofen (ADVIL,MOTRIN) 200 MG tablet Take 200 mg by mouth Every 6 (Six) Hours As Needed for Mild Pain .     • metoprolol succinate XL (TOPROL-XL) 25 MG 24 hr tablet Take 0.5 tablets by mouth Daily. 90 tablet 3   • felodipine (PLENDIL) 2.5 MG 24 hr tablet Take 3 tablets by mouth Daily. 270 tablet 1     No facility-administered medications prior to visit.       Patient Active Problem List   Diagnosis   • Bone spur   • Cervical radiculopathy   • Elevated prostate specific antigen (PSA)   • Hypercholesterolemia   • Essential hypertension   • Impaired fasting glucose   • Osteoarthritis   • Recent skin changes   • Tinnitus   • Heart murmur   • Valvular heart disease, mild to moderate AS   • Angina pectoris  "(CMS/[facility])   • Dyspnea on exertion   • Right bundle branch block   • Sinus arrhythmia   • Abnormal stress test   • History of kidney disease   • Coronary artery disease, 50% mid circumflex lesion and nonobstructive PDA lesion   • Bradycardia, sinus   • Palpitations   • Memory impairment of gradual onset       Advanced Care Planning:  ACP discussion was held with the patient during this visit. Patient has an advance directive in EMR which is still valid.     Review of Systems    Compared to one year ago, the patient feels his physical health is worse.  Compared to one year ago, the patient feels his mental health is the same.    Reviewed chart for potential of high risk medication in the elderly: yes  Reviewed chart for potential of harmful drug interactions in the elderly:yes    Objective         Vitals:    04/21/21 1010   BP: 130/70   BP Location: Right arm   Patient Position: Sitting   Cuff Size: Adult   Pulse: 62   Resp: 18   Temp: 97.5 °F (36.4 °C)   TempSrc: Temporal   SpO2: 98%   Weight: 83 kg (183 lb)   Height: 177.8 cm (70\")       Body mass index is 26.26 kg/m².  Discussed the patient's BMI with him. The BMI is above average; BMI management plan is completed.    Physical Exam  Vitals reviewed. Exam conducted with a chaperone present.   Constitutional:       Appearance: Normal appearance.   HENT:      Head: Normocephalic.   Eyes:      Conjunctiva/sclera: Conjunctivae normal.   Cardiovascular:      Rate and Rhythm: Normal rate and regular rhythm.      Heart sounds: Normal heart sounds.   Pulmonary:      Effort: Pulmonary effort is normal.      Breath sounds: Normal breath sounds.   Musculoskeletal:      Cervical back: Normal range of motion and neck supple.      Right lower leg: Edema present.      Left lower leg: Edema present.   Skin:     General: Skin is warm and dry.   Neurological:      Mental Status: He is alert and oriented to person, place, and time.   Psychiatric:         Mood and Affect: Mood normal. "               Assessment/Plan   Medicare Risks and Personalized Health Plan  CMS Preventative Services Quick Reference  Cardiovascular risk  Immunizations Discussed/Encouraged (specific immunizations; Shingrix )    The above risks/problems have been discussed with the patient.  Pertinent information has been shared with the patient in the After Visit Summary.  Follow up plans and orders are seen below in the Assessment/Plan Section.    Diagnoses and all orders for this visit:    1. Essential hypertension (Primary)  -     CBC & Differential  -     Comprehensive Metabolic Panel  -     losartan (COZAAR) 25 MG tablet; Take 0.5 tablets by mouth Daily.  Dispense: 45 tablet; Refill: 2    2. Memory impairment of gradual onset  -     Comprehensive Metabolic Panel    3. Medicare annual wellness visit, subsequent    4. Impaired fasting glucose  -     Comprehensive Metabolic Panel  -     Hemoglobin A1c  -     Lipid Panel    5. Hypercholesterolemia  -     CBC & Differential  -     Comprehensive Metabolic Panel  -     Lipid Panel    6. Subclinical hypothyroidism  -     TSH      Follow Up:  Return in about 4 months (around 8/21/2021).     An After Visit Summary and PPPS were given to the patient.     Appear to be stable.  Will check labs.  In regard to lower extremity edema.  Diminish salt intake.  We will add losartan 12.5 daily.  Decrease felodipine back to 5 mg daily.  Elevate feet of course.  Notify us after being on that dose for a while if BP and swelling are better.  Make further changes at that time.  Stay safely active maintain pandemic response.  Maintain reasonable diet.  Consider shingles vaccine.  Recheck in about 4 to 6 months or as needed.

## 2021-04-22 LAB
ALBUMIN SERPL-MCNC: 4 G/DL (ref 3.5–5.2)
ALBUMIN/GLOB SERPL: 1.3 G/DL
ALP SERPL-CCNC: 95 U/L (ref 39–117)
ALT SERPL W P-5'-P-CCNC: 17 U/L (ref 1–41)
ANION GAP SERPL CALCULATED.3IONS-SCNC: 10.7 MMOL/L (ref 5–15)
AST SERPL-CCNC: 22 U/L (ref 1–40)
BASOPHILS # BLD AUTO: 0.05 10*3/MM3 (ref 0–0.2)
BASOPHILS NFR BLD AUTO: 0.7 % (ref 0–1.5)
BILIRUB SERPL-MCNC: 0.6 MG/DL (ref 0–1.2)
BUN SERPL-MCNC: 13 MG/DL (ref 8–23)
BUN/CREAT SERPL: 12 (ref 7–25)
CALCIUM SPEC-SCNC: 9.2 MG/DL (ref 8.6–10.5)
CHLORIDE SERPL-SCNC: 104 MMOL/L (ref 98–107)
CHOLEST SERPL-MCNC: 131 MG/DL (ref 0–200)
CO2 SERPL-SCNC: 26.3 MMOL/L (ref 22–29)
CREAT SERPL-MCNC: 1.08 MG/DL (ref 0.76–1.27)
DEPRECATED RDW RBC AUTO: 39.2 FL (ref 37–54)
EOSINOPHIL # BLD AUTO: 0.14 10*3/MM3 (ref 0–0.4)
EOSINOPHIL NFR BLD AUTO: 1.9 % (ref 0.3–6.2)
ERYTHROCYTE [DISTWIDTH] IN BLOOD BY AUTOMATED COUNT: 11.8 % (ref 12.3–15.4)
GFR SERPL CREATININE-BSD FRML MDRD: 65 ML/MIN/1.73
GLOBULIN UR ELPH-MCNC: 3.2 GM/DL
GLUCOSE SERPL-MCNC: 102 MG/DL (ref 65–99)
HBA1C MFR BLD: 5.36 % (ref 4.8–5.6)
HCT VFR BLD AUTO: 44.1 % (ref 37.5–51)
HDLC SERPL-MCNC: 44 MG/DL (ref 40–60)
HGB BLD-MCNC: 15.4 G/DL (ref 13–17.7)
IMM GRANULOCYTES # BLD AUTO: 0.02 10*3/MM3 (ref 0–0.05)
IMM GRANULOCYTES NFR BLD AUTO: 0.3 % (ref 0–0.5)
LDLC SERPL CALC-MCNC: 67 MG/DL (ref 0–100)
LDLC/HDLC SERPL: 1.48 {RATIO}
LYMPHOCYTES # BLD AUTO: 1.7 10*3/MM3 (ref 0.7–3.1)
LYMPHOCYTES NFR BLD AUTO: 22.9 % (ref 19.6–45.3)
MCH RBC QN AUTO: 32 PG (ref 26.6–33)
MCHC RBC AUTO-ENTMCNC: 34.9 G/DL (ref 31.5–35.7)
MCV RBC AUTO: 91.5 FL (ref 79–97)
MONOCYTES # BLD AUTO: 0.79 10*3/MM3 (ref 0.1–0.9)
MONOCYTES NFR BLD AUTO: 10.6 % (ref 5–12)
NEUTROPHILS NFR BLD AUTO: 4.72 10*3/MM3 (ref 1.7–7)
NEUTROPHILS NFR BLD AUTO: 63.6 % (ref 42.7–76)
NRBC BLD AUTO-RTO: 0 /100 WBC (ref 0–0.2)
PLATELET # BLD AUTO: 241 10*3/MM3 (ref 140–450)
PMV BLD AUTO: 12.4 FL (ref 6–12)
POTASSIUM SERPL-SCNC: 4.5 MMOL/L (ref 3.5–5.2)
PROT SERPL-MCNC: 7.2 G/DL (ref 6–8.5)
RBC # BLD AUTO: 4.82 10*6/MM3 (ref 4.14–5.8)
SODIUM SERPL-SCNC: 141 MMOL/L (ref 136–145)
TRIGL SERPL-MCNC: 109 MG/DL (ref 0–150)
TSH SERPL DL<=0.05 MIU/L-ACNC: 4.3 UIU/ML (ref 0.27–4.2)
VLDLC SERPL-MCNC: 20 MG/DL (ref 5–40)
WBC # BLD AUTO: 7.42 10*3/MM3 (ref 3.4–10.8)

## 2021-05-27 ENCOUNTER — HOSPITAL ENCOUNTER (OUTPATIENT)
Dept: CARDIOLOGY | Facility: HOSPITAL | Age: 86
Discharge: HOME | End: 2021-05-27
Admitting: FAMILY MEDICINE

## 2021-05-27 ENCOUNTER — OFFICE VISIT (OUTPATIENT)
Dept: FAMILY MEDICINE CLINIC | Facility: CLINIC | Age: 86
End: 2021-05-27

## 2021-05-27 ENCOUNTER — HOSPITAL ENCOUNTER (EMERGENCY)
Facility: HOSPITAL | Age: 86
End: 2021-05-27

## 2021-05-27 VITALS
BODY MASS INDEX: 25.91 KG/M2 | WEIGHT: 181 LBS | HEIGHT: 70 IN | TEMPERATURE: 97.7 F | OXYGEN SATURATION: 96 % | DIASTOLIC BLOOD PRESSURE: 70 MMHG | SYSTOLIC BLOOD PRESSURE: 138 MMHG | RESPIRATION RATE: 16 BRPM | HEART RATE: 55 BPM

## 2021-05-27 DIAGNOSIS — R22.1 LOCALIZED SWELLING, MASS OR LUMP OF NECK: ICD-10-CM

## 2021-05-27 DIAGNOSIS — R09.89 ABNORMAL CAROTID PULSE: ICD-10-CM

## 2021-05-27 DIAGNOSIS — R09.89 ABNORMAL CAROTID PULSE: Primary | ICD-10-CM

## 2021-05-27 PROCEDURE — 99214 OFFICE O/P EST MOD 30 MIN: CPT | Performed by: FAMILY MEDICINE

## 2021-05-27 PROCEDURE — 93882 EXTRACRANIAL UNI/LTD STUDY: CPT

## 2021-05-27 RX ORDER — ATORVASTATIN CALCIUM 40 MG/1
TABLET, FILM COATED ORAL
Qty: 90 TABLET | Refills: 3 | Status: SHIPPED | OUTPATIENT
Start: 2021-05-27 | End: 2022-05-23

## 2021-05-27 NOTE — PROGRESS NOTES
Subjective   Clinton Ramirez is a 86 y.o. male.     History of Present Illness noticed a nonpainful swelling right side of neck just yesterday.  Unsure of reason.  Denies sore throat nasal congestion.  Denies dizziness headaches.    The following portions of the patient's history were reviewed and updated as appropriate: allergies, current medications, past family history, past social history, past surgical history and problem list.    Review of Systems  See history of Present Illness     Objective     Physical Exam  Vitals reviewed.   Constitutional:       Appearance: Normal appearance.   HENT:      Head: Normocephalic.   Eyes:      Conjunctiva/sclera: Conjunctivae normal.   Neck:      Vascular: No carotid bruit.      Comments: Prominent right carotid artery potentially at carotid bulb location.  Palpably pulsatile enlargement.  Cardiovascular:      Rate and Rhythm: Normal rate and regular rhythm.      Heart sounds: Normal heart sounds.   Pulmonary:      Effort: Pulmonary effort is normal.      Breath sounds: Normal breath sounds.   Musculoskeletal:      Cervical back: Normal range of motion and neck supple.   Lymphadenopathy:      Cervical: No cervical adenopathy.   Skin:     General: Skin is warm and dry.   Neurological:      Mental Status: He is alert and oriented to person, place, and time.   Psychiatric:         Mood and Affect: Mood normal.         PHQ-9 Total Score:      Body mass index is 25.97 kg/m².       Assessment/Plan     Diagnoses and all orders for this visit:    1. Abnormal carotid pulse (Primary)  -     US carotid right; Future    2. Localized swelling, mass or lump of neck  -     US carotid right; Future    We will ask for ultrasound to start defining this area.  May need more extensive evaluation and/or referral.  Follow-up results with recommendations.  Continue all medications same at this time.  If this area seems to enlarge or change do not hesitate to go to ER.                     This  document has been electronically signed by Nolberto Navarro MD   May 27, 2021 13:49 EDT    Part of this note may be an electronic transcription/translation of spoken language to printed text using the Dragon Dictation System.

## 2021-05-28 PROCEDURE — 93882 EXTRACRANIAL UNI/LTD STUDY: CPT | Performed by: RADIOLOGY

## 2021-05-31 NOTE — PROGRESS NOTES
Please contact radiology and ask if they can give an opinion regarding possible aneurysmal dilatation of the right carotid or enlargement of the right carotid bulb from this study.  May need to do just a plain ultrasound nonflow study thanks

## 2021-06-02 ENCOUNTER — TELEPHONE (OUTPATIENT)
Dept: FAMILY MEDICINE CLINIC | Facility: CLINIC | Age: 86
End: 2021-06-02

## 2021-06-02 NOTE — TELEPHONE ENCOUNTER
Caller: Delaney Ramirez    Relationship: Emergency Contact    Best call back number: [phone]    Caller requesting test results: DELANEY (WIFE)    What test was performed: ULTRASOUND    When was the test performed: 5/27/21    Where was the test performed: [facility]    Additional notes:

## 2021-06-02 NOTE — TELEPHONE ENCOUNTER
Patients wife has called for results of US that was done on 05/27. The chart shows that Dr. Navarro had a question about it, but nothing to tell the patient. Please advise.

## 2021-06-03 NOTE — TELEPHONE ENCOUNTER
Called patients wife at [phone]. Informed her of test results and that Dr. Navarro will make decision on next steps once he returns to the clinic. Patients wife verbalized understanding.

## 2021-06-07 DIAGNOSIS — R22.1 LOCALIZED SWELLING, MASS OR LUMP OF NECK: Primary | ICD-10-CM

## 2021-06-23 ENCOUNTER — HOSPITAL ENCOUNTER (OUTPATIENT)
Dept: CT IMAGING | Facility: HOSPITAL | Age: 86
Discharge: HOME | End: 2021-06-23
Admitting: FAMILY MEDICINE

## 2021-06-23 DIAGNOSIS — R22.1 LOCALIZED SWELLING, MASS OR LUMP OF NECK: ICD-10-CM

## 2021-06-23 LAB — CREAT BLDA-MCNC: 1.2 MG/DL (ref 0.6–1.3)

## 2021-06-23 PROCEDURE — 70491 CT SOFT TISSUE NECK W/DYE: CPT | Performed by: RADIOLOGY

## 2021-06-23 PROCEDURE — 70491 CT SOFT TISSUE NECK W/DYE: CPT

## 2021-06-23 PROCEDURE — 82565 ASSAY OF CREATININE: CPT

## 2021-06-23 PROCEDURE — 25010000002 IOPAMIDOL 61 % SOLUTION: Performed by: FAMILY MEDICINE

## 2021-06-23 RX ADMIN — IOPAMIDOL 80 ML: 612 INJECTION, SOLUTION INTRAVENOUS at 10:28

## 2021-06-29 ENCOUNTER — OFFICE VISIT (OUTPATIENT)
Dept: FAMILY MEDICINE CLINIC | Facility: CLINIC | Age: 86
End: 2021-06-29

## 2021-06-29 VITALS
HEART RATE: 62 BPM | WEIGHT: 181.2 LBS | SYSTOLIC BLOOD PRESSURE: 130 MMHG | HEIGHT: 70 IN | BODY MASS INDEX: 25.94 KG/M2 | DIASTOLIC BLOOD PRESSURE: 74 MMHG | OXYGEN SATURATION: 98 % | TEMPERATURE: 97.1 F

## 2021-06-29 DIAGNOSIS — R93.89 ABNORMAL CT SCAN, NECK: ICD-10-CM

## 2021-06-29 DIAGNOSIS — R22.1 MASS OF RIGHT SIDE OF NECK: Primary | ICD-10-CM

## 2021-06-29 DIAGNOSIS — M25.511 ACUTE PAIN OF RIGHT SHOULDER: ICD-10-CM

## 2021-06-29 PROCEDURE — 99214 OFFICE O/P EST MOD 30 MIN: CPT | Performed by: FAMILY MEDICINE

## 2021-06-29 NOTE — PROGRESS NOTES
Subjective   Clinton Ramirez is a 86 y.o. male.     History of Present Illness follow-up on the CT of neck.  Is also had some short-lived fairly acute but now nearly resolved mechanical right shoulder discomfort.  Wife rubs some cream on it gave some benefit.  Lancinating type of pain when it occurred.  Denies respiratory CDV or other concerns.  The lesion on right side of neck is really unchanged and asymptomatic.    The following portions of the patient's history were reviewed and updated as appropriate: allergies, past family history, past medical history, past social history, past surgical history and problem list.    Review of Systems  See history of Present Illness     Objective     Physical Exam  Vitals reviewed.   Constitutional:       Appearance: Normal appearance.   HENT:      Head: Normocephalic.   Neck:      Comments: None tender mass right neck consistent in size.  Not mobile.  No submandibular submental or other cervical lumps noted.  Cardiovascular:      Rate and Rhythm: Normal rate and regular rhythm.      Heart sounds: Normal heart sounds.   Pulmonary:      Effort: Pulmonary effort is normal.      Breath sounds: Normal breath sounds.   Musculoskeletal:         General: Normal range of motion.      Cervical back: Normal range of motion and neck supple.   Skin:     General: Skin is warm and dry.   Neurological:      Mental Status: He is alert and oriented to person, place, and time.   Psychiatric:         Mood and Affect: Mood normal.         PHQ-9 Total Score:      Body mass index is 26 kg/m².       Assessment/Plan     Diagnoses and all orders for this visit:    1. Mass of right side of neck (Primary)  -     Ambulatory Referral to ENT (Otolaryngology)    2. Abnormal CT scan, neck  -     Ambulatory Referral to ENT (Otolaryngology)    3. Acute pain of right shoulder    Can observe the shoulder till resolution.  Discussed the CT report with you and wife.  Will refer to ENT for further evaluation and  treatment investigation recommendations.  No new meds.  Follow-up as scheduled and pending.                     This document has been electronically signed by Nolberto Navarro MD   June 29, 2021 08:52 EDT    Part of this note may be an electronic transcription/translation of spoken language to printed text using the Dragon Dictation System.

## 2021-08-24 ENCOUNTER — OFFICE VISIT (OUTPATIENT)
Dept: FAMILY MEDICINE CLINIC | Facility: CLINIC | Age: 86
End: 2021-08-24

## 2021-08-24 VITALS
HEART RATE: 46 BPM | SYSTOLIC BLOOD PRESSURE: 126 MMHG | WEIGHT: 175.9 LBS | TEMPERATURE: 97.6 F | BODY MASS INDEX: 25.18 KG/M2 | DIASTOLIC BLOOD PRESSURE: 54 MMHG | HEIGHT: 70 IN

## 2021-08-24 DIAGNOSIS — I10 ESSENTIAL HYPERTENSION: Chronic | ICD-10-CM

## 2021-08-24 DIAGNOSIS — E78.00 HYPERCHOLESTEROLEMIA: Primary | Chronic | ICD-10-CM

## 2021-08-24 DIAGNOSIS — R41.3 MEMORY IMPAIRMENT OF GRADUAL ONSET: Chronic | ICD-10-CM

## 2021-08-24 PROCEDURE — 99214 OFFICE O/P EST MOD 30 MIN: CPT | Performed by: FAMILY MEDICINE

## 2021-08-24 NOTE — PROGRESS NOTES
Subjective   Clinton Ramirez is a 86 y.o. male.     History of Present Illness follow-up hypertension dyslipidemia.  Has visited with ENT and had surgery on the right neck lesion.  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  Will be receiving external beam radiation treatments beginning soon.  Utilizing all medications as reconciled.  BP has been marginal but heart rate is staying lower.  Denies shortness of breath chest pain palpitations GI  changes.  No new skin concerns no orthopedic concerns.    The following portions of the patient's history were reviewed and updated as appropriate: allergies, current medications, past family history, past social history, past surgical history and problem list.    Review of Systems  See history of Present Illness     Objective     Physical Exam  Vitals reviewed.   Constitutional:       Appearance: Normal appearance.   HENT:      Head: Normocephalic.   Eyes:      Conjunctiva/sclera: Conjunctivae normal.   Cardiovascular:      Rate and Rhythm: Normal rate and regular rhythm.      Heart sounds: Normal heart sounds.   Pulmonary:      Effort: Pulmonary effort is normal.      Breath sounds: Normal breath sounds.   Musculoskeletal:      Cervical back: Normal range of motion and neck supple.   Skin:     General: Skin is warm and dry.   Neurological:      Mental Status: He is alert and oriented to person, place, and time.   Psychiatric:         Mood and Affect: Mood normal.         PHQ-9 Total Score:      Body mass index is 25.24 kg/m².       Assessment/Plan     Diagnoses and all orders for this visit:    1. Hypercholesterolemia (Primary)    2. Essential hypertension    3. Memory impairment of gradual onset    A relative bradycardia does exist.  BP marginal.  Discontinue the metoprolol.  Continue other medications as reconciled.  Felodipine at 5 mg daily only.  Continue the Aricept aspirin atorvastatin losartan.  Follow-up here in a few months routinely.  Keep follow-up  with cardiology next week.  Will defer decision regarding different or need of beta-blocker to cardiology.                     This document has been electronically signed by Nolberto Navarro MD   August 24, 2021 11:44 EDT    Part of this note may be an electronic transcription/translation of spoken language to printed text using the Dragon Dictation System.

## 2021-08-31 ENCOUNTER — OFFICE VISIT (OUTPATIENT)
Dept: CARDIOLOGY | Facility: CLINIC | Age: 86
End: 2021-08-31

## 2021-08-31 VITALS
HEIGHT: 70 IN | SYSTOLIC BLOOD PRESSURE: 124 MMHG | WEIGHT: 174.2 LBS | OXYGEN SATURATION: 97 % | TEMPERATURE: 96.8 F | HEART RATE: 44 BPM | BODY MASS INDEX: 24.94 KG/M2 | DIASTOLIC BLOOD PRESSURE: 70 MMHG

## 2021-08-31 DIAGNOSIS — E78.00 HYPERCHOLESTEROLEMIA: Chronic | ICD-10-CM

## 2021-08-31 DIAGNOSIS — R00.2 PALPITATIONS: ICD-10-CM

## 2021-08-31 DIAGNOSIS — I25.10 CORONARY ARTERY DISEASE INVOLVING NATIVE CORONARY ARTERY OF NATIVE HEART WITHOUT ANGINA PECTORIS: Chronic | ICD-10-CM

## 2021-08-31 DIAGNOSIS — R00.1 BRADYCARDIA, SINUS: Primary | ICD-10-CM

## 2021-08-31 DIAGNOSIS — I10 ESSENTIAL HYPERTENSION: Chronic | ICD-10-CM

## 2021-08-31 PROCEDURE — 93000 ELECTROCARDIOGRAM COMPLETE: CPT | Performed by: INTERNAL MEDICINE

## 2021-08-31 PROCEDURE — 99214 OFFICE O/P EST MOD 30 MIN: CPT | Performed by: INTERNAL MEDICINE

## 2021-08-31 NOTE — PROGRESS NOTES
subjective     Chief Complaint   Patient presents with   • Coronary Artery Disease     follow up   • Palpitations     denies any at this time   • Edema     bilat feet     History of Present Illness  Mr. Ramirez is 86 years old white male who is here for cardiology follow-up. Patient is totally asymptomatic except for mild lower extremity edema.  He denies any chest pain palpitations or shortness of breath.  He recently had an EKG done at Dr. Navarro office and was found to have significant bradycardia. He used to take Toprol-XL 12.5 daily which was discontinued.  Today his heart rate is still around 44 bpm, he is not taking any beta-blocker therapy or other jermain agents at  Despite bradycardia he is still is asymptomatic denies any dizziness syncope or near syncope.    Blood pressure is very well controlled with Plendil and losartan.  Mild ankle edema is probably due to Plendil therapy.  Hyperlipidemia on Lipitor.      Past Surgical History:   Procedure Laterality Date   • BIOPSY PROSTATE NEEDLE / PUNCH / INCISIONAL     • CARDIAC CATHETERIZATION N/A 7/11/2018    Procedure: Left Heart Cath;  Surgeon: Claire Scott MD;  Location: [facility] INVASIVE LOCATION;  Service: Cardiovascular   • COLONOSCOPY     • EAR BIOPSY      basal cell skin cancer   • SKIN CANCER EXCISION      melanoma from neck      Family History   Problem Relation Age of Onset   • Cancer Sister    • Tuberculosis Other    • Diabetes Other    • Heart disease Father      Past Medical History:   Diagnosis Date   • Arthritis     hands   • Elevated cholesterol    • Hypertension    • Hypokalemia    • Multiple benign polyps of large intestine    • Need for prophylactic vaccination and inoculation against influenza    • Pancreatitis    • Paralytic ileus (CMS/HCC)    • PPD positive     negative chest xray    • Renal insufficiency     mild-told to stop Naproxen   • Rheumatic fever     age 20 while in -hospitalized    • Skin cancer     basal cell from ears,  melanoma from neck area    • Valvular disease    • Wears glasses    • Wears partial dentures     upper      Patient Active Problem List   Diagnosis   • Bone spur   • Cervical radiculopathy   • Elevated prostate specific antigen (PSA)   • Hypercholesterolemia   • Essential hypertension   • Impaired fasting glucose   • Osteoarthritis   • Recent skin changes   • Tinnitus   • Heart murmur   • Valvular heart disease, mild to moderate AS   • Angina pectoris (CMS/HCC)   • Dyspnea on exertion   • Right bundle branch block   • Sinus arrhythmia   • Abnormal stress test   • History of kidney disease   • Coronary artery disease, 50% mid circumflex lesion and nonobstructive PDA lesion   • Bradycardia, sinus   • Palpitations   • Memory impairment of gradual onset       Social History     Tobacco Use   • Smoking status: Former Smoker     Packs/day: 3.00     Years: 11.00     Pack years: 33.00     Quit date:      Years since quittin.6   • Smokeless tobacco: Never Used   Vaping Use   • Vaping Use: Never used   Substance Use Topics   • Alcohol use: Yes     Alcohol/week: 7.0 standard drinks     Types: 7 Cans of beer per week     Comment: 1 beer daily sometimes 2    • Drug use: No       No Known Allergies    Current Outpatient Medications on File Prior to Visit   Medication Sig   • aspirin 81 MG chewable tablet Chew 81 mg Every Morning.   • atorvastatin (LIPITOR) 40 MG tablet TAKE 1 TABLET DAILY   • donepezil (ARICEPT) 10 MG tablet TAKE 1 TABLET EVERY NIGHT   • felodipine (PLENDIL) 2.5 MG 24 hr tablet Take 2 tablets by mouth Daily.   • ibuprofen (ADVIL,MOTRIN) 200 MG tablet Take 200 mg by mouth Every 6 (Six) Hours As Needed for Mild Pain .   • losartan (COZAAR) 25 MG tablet Take 0.5 tablets by mouth Daily.     No current facility-administered medications on file prior to visit.         The following portions of the patient's history were reviewed and updated as appropriate: allergies, current medications, past family history,  "past medical history, past social history, past surgical history and problem list.    Review of Systems   Constitutional: Negative.   HENT: Negative.  Negative for congestion.    Eyes: Negative.    Cardiovascular: Negative.  Negative for chest pain, cyanosis, dyspnea on exertion, irregular heartbeat, leg swelling, near-syncope, orthopnea, palpitations, paroxysmal nocturnal dyspnea and syncope.   Respiratory: Negative.  Negative for shortness of breath.    Hematologic/Lymphatic: Negative.    Musculoskeletal: Negative.    Gastrointestinal: Negative.    Neurological: Negative.  Negative for headaches.          Objective:     /70 (BP Location: Left arm, Patient Position: Sitting, Cuff Size: Adult)   Pulse (!) 44   Temp 96.8 °F (36 °C)   Ht 177.8 cm (70\")   Wt 79 kg (174 lb 3.2 oz)   SpO2 97%   BMI 25.00 kg/m²   Pulmonary:      Effort: Pulmonary effort is normal.      Breath sounds: Normal breath sounds. No stridor. No wheezing. No rhonchi. No rales.   Cardiovascular:      PMI at left midclavicular line. Bradycardia present. Regular rhythm. Normal S1. Normal S2.      Murmurs: There is no murmur.      No gallop. No click. No rub.   Pulses:     Intact distal pulses.   Edema:     Peripheral edema absent.           Lab Review  Lab Results   Component Value Date     04/21/2021    K 4.5 04/21/2021     04/21/2021    BUN 13 04/21/2021    CREATININE 1.20 06/23/2021    GLUCOSE 102 (H) 04/21/2021    CALCIUM 9.2 04/21/2021    ALT 17 04/21/2021    ALKPHOS 95 04/21/2021    LABIL2 1.3 (L) 05/12/2016     Lab Results   Component Value Date    CKTOTAL 43 03/07/2019     Lab Results   Component Value Date    WBC 7.42 04/21/2021    HGB 15.4 04/21/2021    HCT 44.1 04/21/2021     04/21/2021     No results found for: INR  No results found for: MG  Lab Results   Component Value Date    TSH 4.300 (H) 04/21/2021     No results found for: BNP  Lab Results   Component Value Date    CHLPL 166 05/12/2016    CHOL 131 " 04/21/2021    TRIG 109 04/21/2021    HDL 44 04/21/2021    VLDL 20 04/21/2021    LDLHDL 1.48 04/21/2021     Lab Results   Component Value Date    LDL 67 04/21/2021    LDL 74 09/17/2020         ECG 12 Lead    Date/Time: 8/31/2021 12:55 PM  Performed by: Sandra Torres MD  Authorized by: Sandra Torres MD   Comparison: compared with previous ECG from 8/24/2020  Comparison to previous ECG: Heart rate is slower now  Rhythm: sinus bradycardia  Rate: bradycardic  BPM: 43  Conduction: right bundle branch block  QRS axis: normal  Other findings: non-specific ST-T wave changes    Clinical impression: abnormal EKG               I personally viewed and interpreted the patient's LAB data         Assessment:     1. Bradycardia, sinus    2. Palpitations    3. Coronary artery disease involving native coronary artery of native heart without angina pectoris    4. Essential hypertension    5. Hypercholesterolemia          Plan:     Patient is asymptomatic however heart rate is only 43 bpm. EKG shows sinus bradycardia and right bundle branch block.  Patient was advised to stay off Toprol  We will arrange 2 weeks Holter monitor for further evaluation.    Blood pressure is very well controlled  Lipid control also is good.  Depending on Holter monitor will decide further therapy. Patient is asymptomatic however. Follow-up scheduled        No follow-ups on file.

## 2021-09-01 ENCOUNTER — CONSULT (OUTPATIENT)
Dept: RADIATION ONCOLOGY | Facility: HOSPITAL | Age: 86
End: 2021-09-01

## 2021-09-01 ENCOUNTER — OFFICE VISIT (OUTPATIENT)
Dept: RADIATION ONCOLOGY | Facility: HOSPITAL | Age: 86
End: 2021-09-01

## 2021-09-01 VITALS
OXYGEN SATURATION: 97 % | SYSTOLIC BLOOD PRESSURE: 140 MMHG | HEART RATE: 59 BPM | TEMPERATURE: 98 F | HEIGHT: 69 IN | RESPIRATION RATE: 16 BRPM | WEIGHT: 172.5 LBS | DIASTOLIC BLOOD PRESSURE: 61 MMHG | BODY MASS INDEX: 25.55 KG/M2

## 2021-09-01 DIAGNOSIS — C76.0 HEAD AND NECK CANCER (HCC): Primary | ICD-10-CM

## 2021-09-01 PROCEDURE — 99204 OFFICE O/P NEW MOD 45 MIN: CPT | Performed by: RADIOLOGY

## 2021-09-01 PROCEDURE — G0463 HOSPITAL OUTPT CLINIC VISIT: HCPCS | Performed by: RADIOLOGY

## 2021-09-01 PROCEDURE — 93246 EXT ECG>7D<15D RECORDING: CPT | Performed by: INTERNAL MEDICINE

## 2021-09-01 NOTE — PROGRESS NOTES
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 male who is here today for consultation regarding radiation therapy. He presents with T1N1 SCC of parotid gland cancer, s/p biopsy and/or excisional biopsy (awaiting further medical records from the patient surgeon). Residual tumor presents.    2021- Pt states that he noticed a lump to the right side of his neck that was not painful. He states that he had noticed it on this date and then presented to his PCP the next day for evaluation of the lump. The patient did not complain of pain except for in his right shoulder.     21-His PCP then ordered an US of the right carotid which showed no acute findings in the arteries of the neck.    2021- His PCP also sent him for a CT of the soft tissues in the neck which showed an enlarged lymph node versus solid inferior right parotid mass and second adjacent borderline lymph node. Pt was referred to ENT at this time.    2021- Pt was seen by ENT specialist Dr. Campuzano who performed a biopsy and an excision, however we do not have official pathology records at this time. Will be in contact with the surgeon office for further documentation. From Dr. Campuzano's note the patient underwent a excision of the tumor on the neck on 8/3/2021. Pathology from this excision showed metastatic carcinoma with histologic and immunohistochemical features consistent with a non-HPV mediated squamous cell carcinoma.     Today the patient is doing well and is accompanied by his wife. He denies any pain, sore throat, nasal congestion, dizziness, or headaches today.       Subjective      Review of Systems:   Review of Systems   Constitutional: Negative.     HENT: Negative.    Eyes: Negative.    Respiratory: Negative.    Cardiovascular: Negative.    Gastrointestinal: Negative.    Endocrine: Negative.    Genitourinary: Negative.    Musculoskeletal: Negative.  Negative for neck pain and neck stiffness.   Skin: Negative.    Neurological: Negative.    Hematological: Negative.    Psychiatric/Behavioral: Negative.      Review of Systems   Constitutional: Negative.    HENT:  Negative.    Eyes: Negative.    Respiratory: Negative.    Cardiovascular: Negative.    Gastrointestinal: Negative.    Endocrine: Negative.    Genitourinary: Negative.     Musculoskeletal: Negative.  Negative for neck pain and neck stiffness.   Skin: Negative.    Neurological: Negative.    Hematological: Negative.    Psychiatric/Behavioral: Negative.        I have reviewed and confirmed the accuracy of the ROS as documented by the MA/LPN/RN VERONICA Tovar     Past Oncology History:   Oncology/Hematology History    No history exists.        Past Radiation History:  No previous exposures to ionizing radiation therapy and there are not relative contraindications including connective tissue disorder.     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, melanoma from neck area    • Valvular disease    • Wears glasses    • Wears partial dentures     upper        Past Surgical History:   Past Surgical History:   Procedure Laterality Date   • BIOPSY PROSTATE NEEDLE / PUNCH / INCISIONAL     • CARDIAC CATHETERIZATION N/A 7/11/2018     Procedure: Left Heart Cath;  Surgeon: Claire Scott MD;  Location:  [facility];  Service: Cardiovascular   • COLONOSCOPY     • EAR BIOPSY      basal cell skin cancer   • SKIN CANCER EXCISION      melanoma from neck        Family History:   Family History   Problem Relation Age of Onset   • Cancer Sister    • Tuberculosis Other    • Diabetes Other    • Heart disease Father        Social History:   Social History     Socioeconomic History   • Marital status:      Spouse name: Not on file   • Number of children: Not on file   • Years of education: Not on file   • Highest education level: Not on file   Tobacco Use   • Smoking status: Former Smoker     Packs/day: 3.00     Years: 11.00     Pack years: 33.00     Quit date:      Years since quittin.6   • Smokeless tobacco: Never Used   Vaping Use   • Vaping Use: Never used   Substance and Sexual Activity   • Alcohol use: Yes     Alcohol/week: 7.0 standard drinks     Types: 7 Cans of beer per week     Comment: 1 beer daily sometimes 2    • Drug use: No   • Sexual activity: Defer     Partners: Female     Birth control/protection: None       Medications:     Current Outpatient Medications:   •  aspirin 81 MG chewable tablet, Chew 81 mg Every Morning., Disp: , Rfl:   •  atorvastatin (LIPITOR) 40 MG tablet, TAKE 1 TABLET DAILY, Disp: 90 tablet, Rfl: 3  •  donepezil (ARICEPT) 10 MG tablet, TAKE 1 TABLET EVERY NIGHT, Disp: 90 tablet, Rfl: 3  •  felodipine (PLENDIL) 2.5 MG 24 hr tablet, Take 2 tablets by mouth Daily., Disp: 270 tablet, Rfl: 1  •  ibuprofen (ADVIL,MOTRIN) 200 MG tablet, Take 200 mg by mouth Every 6 (Six) Hours As Needed for Mild Pain ., Disp: , Rfl:   •  losartan (COZAAR) 25 MG tablet, Take 0.5 tablets by mouth Daily., Disp: 45 tablet, Rfl: 2    Allergies:   No Known Allergies    KPS: 90%     Results Review:    The following data was reviewed by: VERONICA Tovar on 2021:  Common labs    Common Labsle 20  9/17/20 4/21/21 4/21/21 4/21/21 4/21/21 6/23/21    1134 1134 1134 1049 1049 1049 1049    Glucose  103 (A)   102 (A)      BUN  12   13      Creatinine  1.18   1.08   1.20   eGFR Non  Am  59 (A)   65      Sodium  138   141      Potassium  4.7   4.5      Chloride  103   104      Calcium  9.1   9.2      Albumin  4.30   4.00      Total Bilirubin  0.7   0.6      Alkaline Phosphatase  104   95      AST (SGOT)  21   22      ALT (SGPT)  17   17      WBC 8.76      7.42    Hemoglobin 15.7      15.4    Hematocrit 45.1      44.1    Platelets 240      241    Total Cholesterol   141   131     Triglycerides   124   109     HDL Cholesterol   42   44     LDL Cholesterol    74   67     Hemoglobin A1C    5.36       (A) Abnormal value       Comments are available for some flowsheets but are not being displayed.           Imaging:    US Carotid Right 5/27/2021  IMPRESSION:    No acute findings in the arteries of the neck.    CT Soft Tissue Neck 6/23/2021  FINDINGS:  There is a right parotid solid mass measuring 1.8 cm and just anterior  there is what appears to be a lymph node which measures 9 mm. Both of  these are best seen on image 42 of the axial series.  No other evidence of adenopathy  No extrinsic deviation of the airway.     IMPRESSION:  Enlarged lymph node versus solid inferior right parotid mass  and second adjacent borderline lymph node.     No other imaging or pathology found for this patient, waiting to hear back from Dr. Campuzano's office to obtain further documentation and results.      Objective     Physical Exam:  Physical Exam  Vitals reviewed.   Constitutional:       General: He is not in acute distress.     Appearance: Normal appearance. He is normal weight. He is not ill-appearing.   HENT:      Head: Normocephalic.      Nose: Nose normal.      Mouth/Throat:      Mouth: Mucous membranes are moist.      Pharynx: Oropharynx is clear.   Eyes:      Extraocular Movements: Extraocular movements intact.       "Conjunctiva/sclera: Conjunctivae normal.      Pupils: Pupils are equal, round, and reactive to light.   Cardiovascular:      Rate and Rhythm: Normal rate and regular rhythm.      Pulses: Normal pulses.      Heart sounds: Normal heart sounds.   Pulmonary:      Effort: Pulmonary effort is normal. No respiratory distress.      Breath sounds: Normal breath sounds.   Abdominal:      General: Abdomen is flat. Bowel sounds are normal. There is no distension.      Palpations: Abdomen is soft. There is no mass.   Musculoskeletal:         General: No swelling or tenderness. Normal range of motion.      Cervical back: Normal range of motion. No tenderness.   Skin:     General: Skin is warm and dry.      Capillary Refill: Capillary refill takes less than 2 seconds.   Neurological:      General: No focal deficit present.      Mental Status: He is alert and oriented to person, place, and time. Mental status is at baseline.   Psychiatric:         Mood and Affect: Mood normal.         Behavior: Behavior normal.         Thought Content: Thought content normal.         Judgment: Judgment normal.         Vital Signs:   Vitals:    09/01/21 0857   BP: 140/61   Pulse: 59   Resp: 16   Temp: 98 °F (36.7 °C)   TempSrc: Temporal   SpO2: 97%   Weight: 78.2 kg (172 lb 8 oz)   Height: 175.3 cm (69\")   PainSc: 0-No pain     Body mass index is 25.47 kg/m².       Assessment / Plan      Assessment/Plan:   Mr. 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 Residual tumor presents.    The standard of care treatment for salivary gland carcinomas is surgery alone or followed by postoperative RT. Therefore, RT alone is used primarily for patients with advanced-stage, incompletely resectable tumors and for the small subset of patients with tumors in locations where resection would result in a major functional and/or cosmetic deficit. The 10-year locoregional rates for RT alone for stage " I-III is 70%.    Radiation therapy will  Involve IMRT with IGRT technique. Intensity-Modulated Radiation Therapy (IMRT) is an advanced type of high-precision radiation. Computer-controlled x-ray accelerators distribute precise radiation doses to malignant tumors or specific areas within the tumor. The radiation dose is consistent with the 3-D shape of the tumor by controlling, or modulating, the radiation beam’s intensity. The radiation dose intensity is elevated near the gross tumor volume while radiation among the neighboring normal tissue is decreased or avoided completely. The customized radiation dose is intended to maximize tumor dose while simultaneously protecting the surrounding normal tissue. This may result in better tumor targeting, lessened side effects, and improved treatment outcomes.    We will use IMRT to treat the gross disease to 70Gy/2GyX35, intermediate risk area(Parotid gland, right sided neck lymph nodes) to 63Gy/1.8GyX35, right neck level 1 and 3 to 56Gy.    If he had an complete resection we will offer 60-66Gy based on surgical record and pathology.     Side effects in the head and neck area will involve, but are not limited to: an increase in saliva viscosity resulting in thick, ropey mucous secretions. Loss of taste or dysguesia is variable and although temporary may last for several months after radiation therapy. Return of normal taste function is less likely in patients over 70 years of age or if the oral tongue is in the volume treated. Skin erythema, mucositis, fatigue, dryness, hairy tongue, lymphedema, and trismus.     The patient was educated that side effects from radiation are usually limited to the area of the patient’s body that is under treatment. One of the aims of modern radiotherapy is to reduce side effects to a minimum, and to help the patient understand and deal with those side effects which are unavoidable.     As the epithelium of the oral cavity is turning over  rapidly, it is also targeted inadvertently by the radiation resulting in breakdown. Mucositis begins 1-2 weeks into the radiation. Confluent ulceration with erythematous borders in a background of generalized “whitening” of the oral mucosa is often seen. This is the major source of pain, dysphagia, weight loss and overall debilitation during radiation therapy. If severe, this can affect swallowing, and the patient may need analgesics and nutritional support/food supplements. A feeding tube may be placed temporarily if the patient has difficulty swallowing. Treatment is empiric and consists of systemic analgesics, topical anaesthetics (mucositis rinse) and change is diet to soft, bland foods.     Patient does not need to have dental visit before XRT.    Scribed for Dr. Garrett Dupree MD by VERONICA Duff 9/1/2021 at 1050    I, Garrett Dupree MD, personally performed the services described in this documentation as scribed by the above named individual in my presence, and it is both accurate and complete.  9/1/2021  10:54 EDT      Follow Up:   CT SIM scheduled for 9/9/2021  Covid swab to be performed 2 days before the SIM    Garrett Dupree MD  [facility]

## 2021-09-07 ENCOUNTER — LAB (OUTPATIENT)
Dept: LAB | Facility: HOSPITAL | Age: 86
End: 2021-09-07

## 2021-09-07 ENCOUNTER — TRANSCRIBE ORDERS (OUTPATIENT)
Dept: ADMINISTRATIVE | Facility: HOSPITAL | Age: 86
End: 2021-09-07

## 2021-09-07 DIAGNOSIS — Z01.818 PRE-OPERATIVE CLEARANCE: Primary | ICD-10-CM

## 2021-09-07 DIAGNOSIS — Z01.818 PRE-OPERATIVE CLEARANCE: ICD-10-CM

## 2021-09-07 PROCEDURE — U0004 COV-19 TEST NON-CDC HGH THRU: HCPCS

## 2021-09-07 PROCEDURE — C9803 HOPD COVID-19 SPEC COLLECT: HCPCS

## 2021-09-07 PROCEDURE — U0005 INFEC AGEN DETEC AMPLI PROBE: HCPCS

## 2021-09-07 RX ORDER — DONEPEZIL HYDROCHLORIDE 10 MG/1
TABLET, FILM COATED ORAL
Qty: 90 TABLET | Refills: 3 | Status: SHIPPED | OUTPATIENT
Start: 2021-09-07 | End: 2022-11-30 | Stop reason: SDUPTHER

## 2021-09-08 LAB — SARS-COV-2 RNA NOSE QL NAA+PROBE: NOT DETECTED

## 2021-09-09 ENCOUNTER — APPOINTMENT (OUTPATIENT)
Dept: RADIATION ONCOLOGY | Facility: HOSPITAL | Age: 86
End: 2021-09-09

## 2021-09-09 PROCEDURE — 77334 RADIATION TREATMENT AID(S): CPT

## 2021-09-13 ENCOUNTER — APPOINTMENT (OUTPATIENT)
Dept: RADIATION ONCOLOGY | Facility: HOSPITAL | Age: 86
End: 2021-09-13

## 2021-09-20 PROCEDURE — 77301 RADIOTHERAPY DOSE PLAN IMRT: CPT | Performed by: RADIOLOGY

## 2021-09-20 PROCEDURE — 77338 DESIGN MLC DEVICE FOR IMRT: CPT | Performed by: RADIOLOGY

## 2021-09-20 PROCEDURE — 77300 RADIATION THERAPY DOSE PLAN: CPT | Performed by: RADIOLOGY

## 2021-09-24 ENCOUNTER — TELEPHONE (OUTPATIENT)
Dept: CARDIOLOGY | Facility: CLINIC | Age: 86
End: 2021-09-24

## 2021-09-24 ENCOUNTER — TREATMENT (OUTPATIENT)
Dept: CARDIOLOGY | Facility: CLINIC | Age: 86
End: 2021-09-24

## 2021-09-24 DIAGNOSIS — R00.2 PALPITATIONS: ICD-10-CM

## 2021-09-24 DIAGNOSIS — I47.1 PAROXYSMAL SVT (SUPRAVENTRICULAR TACHYCARDIA) (HCC): ICD-10-CM

## 2021-09-24 DIAGNOSIS — R94.31 ABNORMAL HOLTER MONITOR FINDING: Primary | ICD-10-CM

## 2021-09-24 PROCEDURE — 93248 EXT ECG>7D<15D REV&INTERPJ: CPT | Performed by: INTERNAL MEDICINE

## 2021-09-24 NOTE — TELEPHONE ENCOUNTER
Called and spoke with  Ashley.  Given message per Dr Torres.  He agreed to restart the Toprol XL12.5mg daily.  Referral to EP Cardiology has been placed.  Pt agreed to referral.

## 2021-09-24 NOTE — TELEPHONE ENCOUNTER
----- Message from Sandra Torres MD sent at 9/24/2021  1:47 PM EDT -----  Patient is having long runs of SVT with fast ventricular rate and ST depression without chest pain.Start back Toprol-XL 25 mg half tablet daily(12.5mg) and make an appointment with Dr. Henson EP specialist, urgent  ----- Message -----  From: Ivon Garcia, MA  Sent: 9/24/2021   1:30 PM EDT  To: Sandra Torres MD    Please review EOS report on Holter Monitor.    Thank you

## 2021-09-27 ENCOUNTER — TELEPHONE (OUTPATIENT)
Dept: CARDIOLOGY | Facility: CLINIC | Age: 86
End: 2021-09-27

## 2021-09-27 PROCEDURE — 77386: CPT | Performed by: RADIOLOGY

## 2021-09-27 PROCEDURE — 77014 CHG CT GUIDANCE RADIATION THERAPY FLDS PLACEMENT: CPT | Performed by: RADIOLOGY

## 2021-09-27 NOTE — TELEPHONE ENCOUNTER
Patients wife called said patient is scheduled to have radiation treatments Monday through Friday everyday at 1:00 PM per Dr Gomez, we are suppose to be getting him an appointment with a doctor in [place] but she does not know how they can see anyone else until after his treatments.  Please advise

## 2021-09-28 PROCEDURE — 77014 CHG CT GUIDANCE RADIATION THERAPY FLDS PLACEMENT: CPT | Performed by: RADIOLOGY

## 2021-09-28 PROCEDURE — 77386: CPT | Performed by: RADIOLOGY

## 2021-09-28 NOTE — TELEPHONE ENCOUNTER
Referral made as urgent to Dr Henson for EP Cardiology.  Patient may schedule as appt with his office as they wish.

## 2021-09-29 PROCEDURE — 77386: CPT | Performed by: RADIOLOGY

## 2021-09-29 PROCEDURE — 77014 CHG CT GUIDANCE RADIATION THERAPY FLDS PLACEMENT: CPT | Performed by: RADIOLOGY

## 2021-09-29 PROCEDURE — 77336 RADIATION PHYSICS CONSULT: CPT | Performed by: RADIOLOGY

## 2021-09-30 PROCEDURE — 77014 CHG CT GUIDANCE RADIATION THERAPY FLDS PLACEMENT: CPT | Performed by: RADIOLOGY

## 2021-09-30 PROCEDURE — 77386: CPT | Performed by: RADIOLOGY

## 2021-10-01 ENCOUNTER — APPOINTMENT (OUTPATIENT)
Dept: RADIATION ONCOLOGY | Facility: HOSPITAL | Age: 86
End: 2021-10-01

## 2021-10-01 ENCOUNTER — OFFICE VISIT (OUTPATIENT)
Dept: CARDIOLOGY | Facility: CLINIC | Age: 86
End: 2021-10-01

## 2021-10-01 VITALS
WEIGHT: 175.8 LBS | DIASTOLIC BLOOD PRESSURE: 62 MMHG | BODY MASS INDEX: 26.04 KG/M2 | OXYGEN SATURATION: 97 % | HEIGHT: 69 IN | SYSTOLIC BLOOD PRESSURE: 142 MMHG | HEART RATE: 64 BPM

## 2021-10-01 DIAGNOSIS — I47.1 PAROXYSMAL SVT (SUPRAVENTRICULAR TACHYCARDIA) (HCC): Primary | ICD-10-CM

## 2021-10-01 DIAGNOSIS — R00.1 BRADYCARDIA, SINUS: ICD-10-CM

## 2021-10-01 DIAGNOSIS — E78.00 HYPERCHOLESTEROLEMIA: Chronic | ICD-10-CM

## 2021-10-01 DIAGNOSIS — I25.10 CORONARY ARTERY DISEASE INVOLVING NATIVE CORONARY ARTERY OF NATIVE HEART WITHOUT ANGINA PECTORIS: Chronic | ICD-10-CM

## 2021-10-01 DIAGNOSIS — I10 ESSENTIAL HYPERTENSION: Chronic | ICD-10-CM

## 2021-10-01 PROBLEM — I47.10 PAROXYSMAL SVT (SUPRAVENTRICULAR TACHYCARDIA): Status: ACTIVE | Noted: 2021-10-01

## 2021-10-01 PROCEDURE — 93000 ELECTROCARDIOGRAM COMPLETE: CPT | Performed by: STUDENT IN AN ORGANIZED HEALTH CARE EDUCATION/TRAINING PROGRAM

## 2021-10-01 PROCEDURE — 99204 OFFICE O/P NEW MOD 45 MIN: CPT | Performed by: STUDENT IN AN ORGANIZED HEALTH CARE EDUCATION/TRAINING PROGRAM

## 2021-10-01 RX ORDER — ONDANSETRON 4 MG/1
1 TABLET, FILM COATED ORAL AS NEEDED
COMMUNITY
Start: 2021-08-03 | End: 2022-08-31

## 2021-10-01 RX ORDER — METOPROLOL SUCCINATE 25 MG/1
0.5 TABLET, EXTENDED RELEASE ORAL DAILY
COMMUNITY
Start: 2021-09-24 | End: 2022-03-07

## 2021-10-04 PROCEDURE — 77427 RADIATION TX MANAGEMENT X5: CPT | Performed by: RADIOLOGY

## 2021-10-04 PROCEDURE — 77014 CHG CT GUIDANCE RADIATION THERAPY FLDS PLACEMENT: CPT | Performed by: RADIOLOGY

## 2021-10-04 PROCEDURE — 77386: CPT | Performed by: RADIOLOGY

## 2021-10-05 ENCOUNTER — RADIATION ONCOLOGY WEEKLY ASSESSMENT (OUTPATIENT)
Dept: RADIATION ONCOLOGY | Facility: HOSPITAL | Age: 86
End: 2021-10-05

## 2021-10-05 VITALS
BODY MASS INDEX: 25.84 KG/M2 | OXYGEN SATURATION: 98 % | SYSTOLIC BLOOD PRESSURE: 175 MMHG | TEMPERATURE: 97.3 F | RESPIRATION RATE: 18 BRPM | WEIGHT: 175 LBS | DIASTOLIC BLOOD PRESSURE: 83 MMHG | HEART RATE: 45 BPM

## 2021-10-05 DIAGNOSIS — C76.0 HEAD AND NECK CANCER (HCC): Primary | ICD-10-CM

## 2021-10-05 PROCEDURE — 77014 CHG CT GUIDANCE RADIATION THERAPY FLDS PLACEMENT: CPT | Performed by: RADIOLOGY

## 2021-10-05 PROCEDURE — 77386: CPT | Performed by: RADIOLOGY

## 2021-10-05 NOTE — PROGRESS NOTES
OTV Note      Patient Name: Clinton Ramirez  : 1935   MRN: [id number]     Diagnosis: Head and Neck Cancer  Staging: 3 T1N1 SCC    This patient was seen today for an on treatment visit. The patient is receiving radiation treatment to the parotid gland. The patient has received XRT Dose (cGy): 1200 cGy in 6 fractions out of a planned dose of 7000 cGy in 35 fractions.       Subjective      Review of Systems:   Review of Systems   Constitutional: Positive for fatigue.   HENT: Negative.  Negative for trouble swallowing and voice change.    Eyes: Negative.    Respiratory: Negative.  Negative for cough and shortness of breath.    Cardiovascular: Negative.  Negative for chest pain.   Gastrointestinal: Negative.  Negative for nausea and vomiting.   Endocrine: Negative.    Genitourinary: Negative.    Musculoskeletal: Negative.    Skin: Negative.  Negative for color change and dry skin.   Neurological: Negative.    Hematological: Negative.    Psychiatric/Behavioral: Negative.      Review of Systems   Constitutional: Positive for fatigue.   HENT:  Negative.  Negative for trouble swallowing and voice change.    Eyes: Negative.    Respiratory: Negative.  Negative for cough and shortness of breath.    Cardiovascular: Negative.  Negative for chest pain.   Gastrointestinal: Negative.  Negative for nausea and vomiting.   Endocrine: Negative.    Genitourinary: Negative.     Musculoskeletal: Negative.    Skin: Negative.  Negative for color change.   Neurological: Negative.    Hematological: Negative.    Psychiatric/Behavioral: Negative.        Medications:     Current Outpatient Medications:   •  aspirin 81 MG chewable tablet, Chew 81 mg Every Morning., Disp: , Rfl:   •  atorvastatin (LIPITOR) 40 MG tablet, TAKE 1 TABLET DAILY, Disp: 90 tablet, Rfl: 3  •  donepezil (ARICEPT) 10 MG tablet, TAKE 1 TABLET EVERY NIGHT, Disp: 90 tablet, Rfl: 3  •  felodipine (PLENDIL) 2.5 MG 24 hr tablet, Take 2.5 mg by mouth Daily. 3  tabs daily, Disp: 270 tablet, Rfl: 1  •  ibuprofen (ADVIL,MOTRIN) 200 MG tablet, Take 200 mg by mouth Every 6 (Six) Hours As Needed for Mild Pain ., Disp: , Rfl:   •  losartan (COZAAR) 25 MG tablet, Take 0.5 tablets by mouth Daily., Disp: 45 tablet, Rfl: 2  •  metoprolol succinate XL (TOPROL-XL) 25 MG 24 hr tablet, Take 0.5 tablets by mouth Daily., Disp: , Rfl:   •  ondansetron (ZOFRAN) 4 MG tablet, Take 1 tablet by mouth As Needed., Disp: , Rfl:     Allergies:   No Known Allergies      Objective       Vital Signs:   Vitals:    10/05/21 1351   BP: 175/83   BP Location: Left arm   Patient Position: Lying   Pulse: (!) 45   Resp: 18   Temp: 97.3 °F (36.3 °C)   TempSrc: Temporal   SpO2: 98%   Weight: 79.4 kg (175 lb)     Body mass index is 25.84 kg/m².     Current Total XRT Dose (cGY): XRT Dose (cGy): 1200    Plan      Plan:   Patient was seen today for an on treatment visit. Patient is receiving radiation therapy to the parotid gland. Patient is stable and tolerating radiation therapy well with minimal side effects. No complaints today. Start using Aquaphor on the neck for prevention. Continue with radiation therapy.     I have reviewed treatment setup notes, checked and approved the daily guidance images. I reviewed dose delivery, treatment parameters and deemed them appropriate. We plan to continue radiation therapy as prescribed.     I, Garrett Dupree MD, personally performed the services described in this documentation as scribed by the above named individual in my presence, and it is both accurate and complete.  10/5/2021  14:38 EDT     Electronically signed by Garrett Dupree MD, 10/05/21, 2:38 PM EDT.    Scribed for Dr. Garrett Dupree MD by VERONICA Duff 10/5/2021  14:09 EDT

## 2021-10-06 PROCEDURE — 77336 RADIATION PHYSICS CONSULT: CPT | Performed by: RADIOLOGY

## 2021-10-06 PROCEDURE — 77386: CPT | Performed by: RADIOLOGY

## 2021-10-06 PROCEDURE — 77014 CHG CT GUIDANCE RADIATION THERAPY FLDS PLACEMENT: CPT | Performed by: RADIOLOGY

## 2021-10-07 PROCEDURE — 77386: CPT | Performed by: RADIOLOGY

## 2021-10-07 PROCEDURE — 77014 CHG CT GUIDANCE RADIATION THERAPY FLDS PLACEMENT: CPT | Performed by: RADIOLOGY

## 2021-10-08 PROCEDURE — 77014 CHG CT GUIDANCE RADIATION THERAPY FLDS PLACEMENT: CPT | Performed by: RADIOLOGY

## 2021-10-08 PROCEDURE — 77386: CPT | Performed by: RADIOLOGY

## 2021-10-11 PROCEDURE — 77386: CPT | Performed by: RADIOLOGY

## 2021-10-11 PROCEDURE — 77014 CHG CT GUIDANCE RADIATION THERAPY FLDS PLACEMENT: CPT | Performed by: RADIOLOGY

## 2021-10-12 ENCOUNTER — RADIATION ONCOLOGY WEEKLY ASSESSMENT (OUTPATIENT)
Dept: RADIATION ONCOLOGY | Facility: HOSPITAL | Age: 86
End: 2021-10-12

## 2021-10-12 VITALS
TEMPERATURE: 97.5 F | HEART RATE: 43 BPM | RESPIRATION RATE: 18 BRPM | SYSTOLIC BLOOD PRESSURE: 151 MMHG | BODY MASS INDEX: 25.84 KG/M2 | WEIGHT: 175 LBS | DIASTOLIC BLOOD PRESSURE: 66 MMHG | OXYGEN SATURATION: 98 %

## 2021-10-12 DIAGNOSIS — C76.0 HEAD AND NECK CANCER (HCC): Primary | ICD-10-CM

## 2021-10-12 PROCEDURE — 77014 CHG CT GUIDANCE RADIATION THERAPY FLDS PLACEMENT: CPT | Performed by: RADIOLOGY

## 2021-10-12 PROCEDURE — 77386: CPT | Performed by: RADIOLOGY

## 2021-10-12 NOTE — PROGRESS NOTES
OTV Note      Patient Name: Clinton Ramirez  : 1935   MRN: [id number]     Diagnosis:  Head and Neck Cancer  Staging: 3 T1N1 SCC    This patient was seen today for an on treatment visit. The patient is receiving radiation treatment to the parotid gland. The patient has received XRT Dose (cGy): 2200 cGy in 11 fractions out of a planned dose of 7000 cGy in 35 fractions.       Subjective      Review of Systems:   Review of Systems   Constitutional: Negative.    HENT: Negative.  Negative for trouble swallowing.         Taste changes   Eyes: Negative.    Respiratory: Negative.  Negative for shortness of breath.    Cardiovascular: Negative.    Gastrointestinal: Negative.  Negative for nausea and vomiting.   Endocrine: Negative.    Genitourinary: Negative.    Musculoskeletal: Negative.  Negative for neck pain and neck stiffness.   Skin: Positive for color change.   Neurological: Negative.    Hematological: Negative.    Psychiatric/Behavioral: Negative.      Review of Systems   Constitutional: Negative.    HENT:  Negative.  Negative for trouble swallowing.         Taste changes   Eyes: Negative.    Respiratory: Negative.  Negative for shortness of breath.    Cardiovascular: Negative.    Gastrointestinal: Negative.  Negative for nausea and vomiting.   Endocrine: Negative.    Genitourinary: Negative.     Musculoskeletal: Negative.  Negative for neck pain and neck stiffness.   Skin: Positive for color change.   Neurological: Negative.    Hematological: Negative.    Psychiatric/Behavioral: Negative.        Medications:     Current Outpatient Medications:   •  aspirin 81 MG chewable tablet, Chew 81 mg Every Morning., Disp: , Rfl:   •  atorvastatin (LIPITOR) 40 MG tablet, TAKE 1 TABLET DAILY, Disp: 90 tablet, Rfl: 3  •  donepezil (ARICEPT) 10 MG tablet, TAKE 1 TABLET EVERY NIGHT, Disp: 90 tablet, Rfl: 3  •  felodipine (PLENDIL) 2.5 MG 24 hr tablet, Take 2.5 mg by mouth Daily. 3 tabs daily, Disp: 270 tablet, Rfl:  1  •  ibuprofen (ADVIL,MOTRIN) 200 MG tablet, Take 200 mg by mouth Every 6 (Six) Hours As Needed for Mild Pain ., Disp: , Rfl:   •  losartan (COZAAR) 25 MG tablet, Take 0.5 tablets by mouth Daily., Disp: 45 tablet, Rfl: 2  •  metoprolol succinate XL (TOPROL-XL) 25 MG 24 hr tablet, Take 0.5 tablets by mouth Daily., Disp: , Rfl:   •  ondansetron (ZOFRAN) 4 MG tablet, Take 1 tablet by mouth As Needed., Disp: , Rfl:     Allergies:   No Known Allergies      Objective       Vital Signs:   Vitals:    10/12/21 1300   BP: 151/66   BP Location: Left arm   Patient Position: Sitting   Pulse: (!) 43  Comment: pt and wife states this is normal   Resp: 18   Temp: 97.5 °F (36.4 °C)   TempSrc: Temporal   SpO2: 98%   Weight: 79.4 kg (175 lb)     Body mass index is 25.84 kg/m².     Current Total XRT Dose (cGY): XRT Dose (cGy): 2200    Plan      Plan:   Patient was seen today for an on treatment visit. Patient is receiving radiation therapy to the parotid gland. Patient is stable and tolerating radiation therapy well with minimal side effects. No new complaints today. His treatment area is slightly red, apply Aquaphor or Hydrocortisone cream to the area. Continue with radiation therapy.     I have reviewed treatment setup notes, checked and approved the daily guidance images. I reviewed dose delivery, treatment parameters and deemed them appropriate. We plan to continue radiation therapy as prescribed.     I, Garrett Dupree MD, personally performed the services described in this documentation as scribed by the above named individual in my presence, and it is both accurate and complete.  10/12/2021  14:47 EDT     Electronically signed by Garrett Dupree MD, 10/12/21, 2:47 PM EDT.    Scribed for Dr. Garrett Dupree MD by VERONICA Duff 10/12/2021  13:36 EDT

## 2021-10-13 PROCEDURE — 77386: CPT | Performed by: RADIOLOGY

## 2021-10-13 PROCEDURE — 77336 RADIATION PHYSICS CONSULT: CPT | Performed by: RADIOLOGY

## 2021-10-13 PROCEDURE — 77014 CHG CT GUIDANCE RADIATION THERAPY FLDS PLACEMENT: CPT | Performed by: RADIOLOGY

## 2021-10-14 PROCEDURE — 77014 CHG CT GUIDANCE RADIATION THERAPY FLDS PLACEMENT: CPT | Performed by: RADIOLOGY

## 2021-10-14 PROCEDURE — 77386: CPT | Performed by: RADIOLOGY

## 2021-10-15 PROCEDURE — 77386: CPT

## 2021-10-18 PROCEDURE — 77427 RADIATION TX MANAGEMENT X5: CPT | Performed by: RADIOLOGY

## 2021-10-18 PROCEDURE — 77014 CHG CT GUIDANCE RADIATION THERAPY FLDS PLACEMENT: CPT | Performed by: RADIOLOGY

## 2021-10-18 PROCEDURE — 77386: CPT | Performed by: RADIOLOGY

## 2021-10-19 ENCOUNTER — RADIATION ONCOLOGY WEEKLY ASSESSMENT (OUTPATIENT)
Dept: RADIATION ONCOLOGY | Facility: HOSPITAL | Age: 86
End: 2021-10-19

## 2021-10-19 VITALS
SYSTOLIC BLOOD PRESSURE: 141 MMHG | HEART RATE: 43 BPM | OXYGEN SATURATION: 98 % | RESPIRATION RATE: 16 BRPM | WEIGHT: 173.9 LBS | TEMPERATURE: 97.8 F | BODY MASS INDEX: 25.68 KG/M2 | DIASTOLIC BLOOD PRESSURE: 59 MMHG

## 2021-10-19 DIAGNOSIS — C76.0 HEAD AND NECK CANCER (HCC): Primary | ICD-10-CM

## 2021-10-19 PROCEDURE — 77386: CPT | Performed by: RADIOLOGY

## 2021-10-19 PROCEDURE — 77014 CHG CT GUIDANCE RADIATION THERAPY FLDS PLACEMENT: CPT | Performed by: RADIOLOGY

## 2021-10-19 PROCEDURE — 77427 RADIATION TX MANAGEMENT X5: CPT | Performed by: RADIOLOGY

## 2021-10-19 NOTE — PROGRESS NOTES
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 The patient is receiving radiation treatment to the parotid gland. The patient has received XRT Dose (cGy): 3200 cGy in 16 fractions out of a planned dose of 7000 cGy in 35 fractions.       Subjective      Review of Systems:   Review of Systems   Constitutional: Positive for appetite change.   HENT: Negative.  Negative for trouble swallowing.         Loss of taste   Eyes: Negative.    Respiratory: Negative.  Negative for cough and shortness of breath.    Cardiovascular: Negative.  Negative for chest pain.   Gastrointestinal: Negative.    Endocrine: Negative.    Genitourinary: Negative.    Musculoskeletal: Negative.    Skin: Negative.  Negative for itching, rash and bruise.   Neurological: Negative.    Hematological: Negative.    Psychiatric/Behavioral: Negative.      Review of Systems   Constitutional: Positive for appetite change.   HENT:  Negative.  Negative for trouble swallowing.         Loss of taste   Eyes: Negative.    Respiratory: Negative.  Negative for cough and shortness of breath.    Cardiovascular: Negative.  Negative for chest pain.   Gastrointestinal: Negative.    Endocrine: Negative.    Genitourinary: Negative.     Musculoskeletal: Negative.    Skin: Negative.  Negative for itching, rash and wound.   Neurological: Negative.    Hematological: Negative.    Psychiatric/Behavioral: Negative.        Medications:     Current Outpatient Medications:   •  aspirin 81 MG chewable tablet, Chew 81 mg Every Morning., Disp: , Rfl:   •  atorvastatin (LIPITOR) 40 MG tablet, TAKE 1 TABLET DAILY, Disp: 90 tablet, Rfl: 3  •  donepezil (ARICEPT) 10 MG tablet, TAKE 1 TABLET EVERY NIGHT, Disp: 90 tablet, Rfl: 3  •  felodipine (PLENDIL) 2.5 MG 24 hr tablet, Take 2.5 mg by mouth Daily. 3 tabs daily, Disp: 270 tablet, Rfl:  1  •  ibuprofen (ADVIL,MOTRIN) 200 MG tablet, Take 200 mg by mouth Every 6 (Six) Hours As Needed for Mild Pain ., Disp: , Rfl:   •  losartan (COZAAR) 25 MG tablet, Take 0.5 tablets by mouth Daily., Disp: 45 tablet, Rfl: 2  •  metoprolol succinate XL (TOPROL-XL) 25 MG 24 hr tablet, Take 0.5 tablets by mouth Daily., Disp: , Rfl:   •  ondansetron (ZOFRAN) 4 MG tablet, Take 1 tablet by mouth As Needed., Disp: , Rfl:     Allergies:   No Known Allergies      Objective       Vital Signs:   Vitals:    10/19/21 1300   BP: 141/59   BP Location: Right arm   Patient Position: Sitting   Pulse: (!) 43   Resp: 16   Temp: 97.8 °F (36.6 °C)   TempSrc: Temporal   SpO2: 98%   Weight: 78.9 kg (173 lb 14.4 oz)     Body mass index is 25.68 kg/m².     Current Total XRT Dose (cGY): XRT Dose (cGy): 3200    Plan      Plan:   Patient was seen today for an on treatment visit. Patient is receiving radiation therapy to the parotid gland. Patient is stable and tolerating radiation therapy well with minimal side effects. His treatment area is slightly red, continue use of Aquaphor or Hydrocortisone cream to the area. He has not been wanting to eat due to his loss of taste. Stressed the importance of eating and keeping his weight up. Wife is with him and she is aware of this as well. He states that he has been fatigued as well, instructed that this is normal in order for his body to repair itself. He can start taking B12 and B6 to help with fatigue as well. Continue with radiation therapy.       I have reviewed treatment setup notes, checked and approved the daily guidance images. I reviewed dose delivery, treatment parameters and deemed them appropriate. We plan to continue radiation therapy as prescribed.     I, Garrett Dupree MD, personally performed the services described in this documentation as scribed by the above named individual in my presence, and it is both accurate and complete.  10/19/2021  13:54 EDT     Electronically signed by Garrett Dupree  MD, 10/19/21, 1:54 PM EDT.      Scribed for Dr. Garrett Dupree MD by Jenny Mccauley, VERONICA 10/19/2021  13:47 EDT

## 2021-10-20 PROCEDURE — 77014 CHG CT GUIDANCE RADIATION THERAPY FLDS PLACEMENT: CPT | Performed by: RADIOLOGY

## 2021-10-20 PROCEDURE — 77386: CPT | Performed by: RADIOLOGY

## 2021-10-21 PROCEDURE — 77336 RADIATION PHYSICS CONSULT: CPT | Performed by: RADIOLOGY

## 2021-10-21 PROCEDURE — 77014 CHG CT GUIDANCE RADIATION THERAPY FLDS PLACEMENT: CPT | Performed by: RADIOLOGY

## 2021-10-21 PROCEDURE — 77386: CPT | Performed by: RADIOLOGY

## 2021-10-22 PROCEDURE — 77386: CPT | Performed by: RADIOLOGY

## 2021-10-22 PROCEDURE — 77014 CHG CT GUIDANCE RADIATION THERAPY FLDS PLACEMENT: CPT | Performed by: RADIOLOGY

## 2021-10-25 PROCEDURE — 77014 CHG CT GUIDANCE RADIATION THERAPY FLDS PLACEMENT: CPT | Performed by: RADIOLOGY

## 2021-10-25 PROCEDURE — 77386: CPT | Performed by: RADIOLOGY

## 2021-10-26 ENCOUNTER — RADIATION ONCOLOGY WEEKLY ASSESSMENT (OUTPATIENT)
Dept: RADIATION ONCOLOGY | Facility: HOSPITAL | Age: 86
End: 2021-10-26

## 2021-10-26 VITALS
HEART RATE: 52 BPM | SYSTOLIC BLOOD PRESSURE: 155 MMHG | OXYGEN SATURATION: 96 % | DIASTOLIC BLOOD PRESSURE: 63 MMHG | RESPIRATION RATE: 16 BRPM | TEMPERATURE: 98.2 F

## 2021-10-26 DIAGNOSIS — C76.0 HEAD AND NECK CANCER (HCC): Primary | ICD-10-CM

## 2021-10-26 PROCEDURE — 77427 RADIATION TX MANAGEMENT X5: CPT | Performed by: RADIOLOGY

## 2021-10-26 PROCEDURE — 77386: CPT | Performed by: RADIOLOGY

## 2021-10-26 PROCEDURE — 77014 CHG CT GUIDANCE RADIATION THERAPY FLDS PLACEMENT: CPT | Performed by: RADIOLOGY

## 2021-10-26 NOTE — PROGRESS NOTES
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 The patient is receiving radiation treatment to the parotid gland. The patient has received XRT Dose (cGy): 4200 cGy in 21 fractions out of a planned dose of 7000 cGy in 35 fractions.       Subjective      Review of Systems:   Review of Systems   Constitutional: Positive for appetite change.   HENT: Negative.  Negative for sore throat, trouble swallowing and voice change.         Loss of taste   Eyes: Negative.    Respiratory: Negative.  Negative for cough and shortness of breath.    Cardiovascular: Negative.  Negative for chest pain.   Gastrointestinal: Negative.  Negative for nausea and vomiting.   Endocrine: Negative.    Genitourinary: Negative.    Musculoskeletal: Negative.  Negative for neck pain and neck stiffness.   Skin: Negative.  Negative for color change and dry skin.   Neurological: Negative.    Hematological: Negative.    Psychiatric/Behavioral: Negative.      Review of Systems   Constitutional: Positive for appetite change.   HENT:  Negative.  Negative for sore throat, trouble swallowing and voice change.         Loss of taste   Eyes: Negative.    Respiratory: Negative.  Negative for cough and shortness of breath.    Cardiovascular: Negative.  Negative for chest pain.   Gastrointestinal: Negative.  Negative for nausea and vomiting.   Endocrine: Negative.    Genitourinary: Negative.     Musculoskeletal: Negative.  Negative for neck pain and neck stiffness.   Skin: Negative.  Negative for color change.   Neurological: Negative.    Hematological: Negative.    Psychiatric/Behavioral: Negative.        Medications:     Current Outpatient Medications:   •  aspirin 81 MG chewable tablet, Chew 81 mg Every Morning., Disp: , Rfl:   •  atorvastatin (LIPITOR) 40 MG tablet, TAKE 1 TABLET DAILY, Disp: 90 tablet,  Rfl: 3  •  donepezil (ARICEPT) 10 MG tablet, TAKE 1 TABLET EVERY NIGHT, Disp: 90 tablet, Rfl: 3  •  felodipine (PLENDIL) 2.5 MG 24 hr tablet, Take 2.5 mg by mouth Daily. 3 tabs daily, Disp: 270 tablet, Rfl: 1  •  ibuprofen (ADVIL,MOTRIN) 200 MG tablet, Take 200 mg by mouth Every 6 (Six) Hours As Needed for Mild Pain ., Disp: , Rfl:   •  losartan (COZAAR) 25 MG tablet, Take 0.5 tablets by mouth Daily., Disp: 45 tablet, Rfl: 2  •  metoprolol succinate XL (TOPROL-XL) 25 MG 24 hr tablet, Take 0.5 tablets by mouth Daily., Disp: , Rfl:   •  ondansetron (ZOFRAN) 4 MG tablet, Take 1 tablet by mouth As Needed., Disp: , Rfl:     Allergies:   No Known Allergies      Objective       Vital Signs:   Vitals:    10/26/21 1300   BP: 155/63   BP Location: Left arm   Patient Position: Sitting   Pulse: 52   Resp: 16   Temp: 98.2 °F (36.8 °C)   TempSrc: Temporal   SpO2: 96%     There is no height or weight on file to calculate BMI.     Current Total XRT Dose (cGY): XRT Dose (cGy): 4200    Plan      Plan:   Patient was seen today for an on treatment visit. Patient is receiving radiation therapy to the parotid gland. Patient is stable and tolerating radiation therapy well with minimal side effects. His treatment area is slightly red, continue use of Aquaphor or Hydrocortisone cream to the area. He has not been wanting to eat due to his loss of taste. Stressed the importance of eating and keeping his weight up. Wife is with him and she is aware of this as well. No new complaints today. Continue with radiation therapy.     I have reviewed treatment setup notes, checked and approved the daily guidance images. I reviewed dose delivery, treatment parameters and deemed them appropriate. We plan to continue radiation therapy as prescribed.     I, Garrett Dupree MD, personally performed the services described in this documentation as scribed by the above named individual in my presence, and it is both accurate and complete.  10/26/2021  14:10 EDT      Electronically signed by Garrett Dupree MD, 10/26/21, 2:10 PM EDT.    Scribed for Dr. Garrett Dupree MD by Jenny Mccauley, VERONICA 10/26/2021  13:41 EDT

## 2021-10-27 PROCEDURE — 77386: CPT | Performed by: RADIOLOGY

## 2021-10-27 PROCEDURE — 77336 RADIATION PHYSICS CONSULT: CPT | Performed by: RADIOLOGY

## 2021-10-27 PROCEDURE — 77014 CHG CT GUIDANCE RADIATION THERAPY FLDS PLACEMENT: CPT | Performed by: RADIOLOGY

## 2021-10-28 PROCEDURE — 77386: CPT | Performed by: RADIOLOGY

## 2021-10-28 PROCEDURE — 77014 CHG CT GUIDANCE RADIATION THERAPY FLDS PLACEMENT: CPT | Performed by: RADIOLOGY

## 2021-10-29 PROCEDURE — 77014 CHG CT GUIDANCE RADIATION THERAPY FLDS PLACEMENT: CPT | Performed by: RADIOLOGY

## 2021-10-29 PROCEDURE — 77386: CPT | Performed by: RADIOLOGY

## 2021-11-01 ENCOUNTER — OFFICE VISIT (OUTPATIENT)
Dept: RADIATION ONCOLOGY | Facility: HOSPITAL | Age: 86
End: 2021-11-01

## 2021-11-01 PROCEDURE — 77386: CPT

## 2021-11-02 ENCOUNTER — RADIATION ONCOLOGY WEEKLY ASSESSMENT (OUTPATIENT)
Dept: RADIATION ONCOLOGY | Facility: HOSPITAL | Age: 86
End: 2021-11-02

## 2021-11-02 VITALS
WEIGHT: 169.5 LBS | TEMPERATURE: 98 F | SYSTOLIC BLOOD PRESSURE: 150 MMHG | RESPIRATION RATE: 16 BRPM | DIASTOLIC BLOOD PRESSURE: 62 MMHG | HEART RATE: 48 BPM | OXYGEN SATURATION: 98 % | BODY MASS INDEX: 25.03 KG/M2

## 2021-11-02 DIAGNOSIS — C76.0 HEAD AND NECK CANCER (HCC): Primary | ICD-10-CM

## 2021-11-02 PROCEDURE — 77427 RADIATION TX MANAGEMENT X5: CPT | Performed by: RADIOLOGY

## 2021-11-02 PROCEDURE — 77386: CPT | Performed by: RADIOLOGY

## 2021-11-02 NOTE — PROGRESS NOTES
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 The patient is receiving radiation treatment to the parotid gland. The patient has received XRT Dose (cGy): 5200 cGy in 26 fractions out of a planned dose of 7000 cGy in 35 fractions.       Subjective      Review of Systems:   Review of Systems   Constitutional: Positive for appetite change and fatigue.   HENT: Negative for sore throat and trouble swallowing.    Eyes: Negative.    Respiratory: Negative.  Negative for cough and shortness of breath.    Cardiovascular: Negative.  Negative for chest pain.   Gastrointestinal: Negative.    Endocrine: Negative.    Genitourinary: Negative.    Musculoskeletal: Positive for neck pain. Negative for neck stiffness.   Skin: Positive for color change and dry skin.   Neurological: Negative.    Hematological: Negative.    Psychiatric/Behavioral: Negative.      Review of Systems   Constitutional: Positive for appetite change and fatigue.   HENT:   Negative for sore throat and trouble swallowing.    Eyes: Negative.    Respiratory: Negative.  Negative for cough and shortness of breath.    Cardiovascular: Negative.  Negative for chest pain.   Gastrointestinal: Negative.    Endocrine: Negative.    Genitourinary: Negative.     Musculoskeletal: Positive for neck pain. Negative for neck stiffness.   Skin: Positive for color change.   Neurological: Negative.    Hematological: Negative.    Psychiatric/Behavioral: Negative.        Medications:     Current Outpatient Medications:   •  aspirin 81 MG chewable tablet, Chew 81 mg Every Morning., Disp: , Rfl:   •  atorvastatin (LIPITOR) 40 MG tablet, TAKE 1 TABLET DAILY, Disp: 90 tablet, Rfl: 3  •  donepezil (ARICEPT) 10 MG tablet, TAKE 1 TABLET EVERY NIGHT, Disp: 90 tablet, Rfl: 3  •  felodipine (PLENDIL) 2.5 MG 24 hr tablet, Take 2.5 mg by mouth  Daily. 3 tabs daily, Disp: 270 tablet, Rfl: 1  •  ibuprofen (ADVIL,MOTRIN) 200 MG tablet, Take 200 mg by mouth Every 6 (Six) Hours As Needed for Mild Pain ., Disp: , Rfl:   •  losartan (COZAAR) 25 MG tablet, Take 0.5 tablets by mouth Daily., Disp: 45 tablet, Rfl: 2  •  metoprolol succinate XL (TOPROL-XL) 25 MG 24 hr tablet, Take 0.5 tablets by mouth Daily., Disp: , Rfl:   •  ondansetron (ZOFRAN) 4 MG tablet, Take 1 tablet by mouth As Needed., Disp: , Rfl:     Allergies:   No Known Allergies      Objective       Vital Signs:   Vitals:    11/02/21 1300   BP: 150/62   BP Location: Left arm   Patient Position: Sitting   Pulse: (!) 48   Resp: 16   Temp: 98 °F (36.7 °C)   TempSrc: Temporal   SpO2: 98%   Weight: 76.9 kg (169 lb 8 oz)     Body mass index is 25.03 kg/m².     Current Total XRT Dose (cGY): XRT Dose (cGy): 5200    Plan      Plan:   Patient was seen today for an on treatment visit. Patient is receiving radiation therapy to the parotid gland. Patient is stable and tolerating radiation therapy well with minimal side effects. His treatment area is red and dry, continue use of Aquaphor to the area.  He states that he is having some pain in the area that is relieved with Aquaphor use.  Prescribed cream today.  Instructed to use peroxide/water mixture, wet a washcloth and place on the treatment area and let sit, then wipe away the dead skin.    He has not been wanting to eat due to his loss of taste. Stressed the importance of eating and keeping his weight up.  Boost given today.  Wife is with him and she is aware of this as well. Continue with radiation therapy.    I have reviewed treatment setup notes, checked and approved the daily guidance images. I reviewed dose delivery, treatment parameters and deemed them appropriate. We plan to continue radiation therapy as prescribed.       Scribed for Dr. Garrett Dupree MD by VERONICA Duff 11/2/2021  13:45 EDT     I, Chris Crane MD, have evaluated this  patient.  I agree with the above documentation and plan.    11/02/2021 14:20 EDT.

## 2021-11-03 PROCEDURE — 77386: CPT

## 2021-11-03 PROCEDURE — 77336 RADIATION PHYSICS CONSULT: CPT | Performed by: RADIOLOGY

## 2021-11-04 PROCEDURE — 77386: CPT

## 2021-11-05 PROCEDURE — 77386: CPT | Performed by: RADIOLOGY

## 2021-11-08 PROCEDURE — 77014 CHG CT GUIDANCE RADIATION THERAPY FLDS PLACEMENT: CPT | Performed by: RADIOLOGY

## 2021-11-08 PROCEDURE — 77386: CPT | Performed by: RADIOLOGY

## 2021-11-09 ENCOUNTER — RADIATION ONCOLOGY WEEKLY ASSESSMENT (OUTPATIENT)
Dept: RADIATION ONCOLOGY | Facility: HOSPITAL | Age: 86
End: 2021-11-09

## 2021-11-09 VITALS
OXYGEN SATURATION: 98 % | SYSTOLIC BLOOD PRESSURE: 156 MMHG | TEMPERATURE: 98.6 F | WEIGHT: 169.1 LBS | HEART RATE: 52 BPM | RESPIRATION RATE: 16 BRPM | BODY MASS INDEX: 24.97 KG/M2 | DIASTOLIC BLOOD PRESSURE: 48 MMHG

## 2021-11-09 DIAGNOSIS — C76.0 HEAD AND NECK CANCER (HCC): Primary | ICD-10-CM

## 2021-11-09 PROCEDURE — 77014 CHG CT GUIDANCE RADIATION THERAPY FLDS PLACEMENT: CPT | Performed by: RADIOLOGY

## 2021-11-09 PROCEDURE — 77386: CPT | Performed by: RADIOLOGY

## 2021-11-09 NOTE — PROGRESS NOTES
OTV Note      Patient Name: Clinton Ramirez  : 1935   MRN: [id number]     Diagnosis:  No chief complaint on file.   parotid cancer    Staging: No matching staging information was found for the patient.     This patient was seen today for an on treatment visit. The patient is receiving radiation treatment to the parotid gland. The patient has received XRT Dose (cGy): 6200 cGy in 31 fractions out of a planned dose of 7000 cGy in 35 fractions.       Subjective      Review of Systems:   Review of Systems  Review of Systems - Oncology    Medications:     Current Outpatient Medications:   •  aspirin 81 MG chewable tablet, Chew 81 mg Every Morning., Disp: , Rfl:   •  atorvastatin (LIPITOR) 40 MG tablet, TAKE 1 TABLET DAILY, Disp: 90 tablet, Rfl: 3  •  donepezil (ARICEPT) 10 MG tablet, TAKE 1 TABLET EVERY NIGHT, Disp: 90 tablet, Rfl: 3  •  felodipine (PLENDIL) 2.5 MG 24 hr tablet, Take 2.5 mg by mouth Daily. 3 tabs daily, Disp: 270 tablet, Rfl: 1  •  ibuprofen (ADVIL,MOTRIN) 200 MG tablet, Take 200 mg by mouth Every 6 (Six) Hours As Needed for Mild Pain ., Disp: , Rfl:   •  lidocaine (XYLOCAINE) 2 % jelly, Apply  topically to the appropriate area as directed As Needed for Mild Pain ., Disp: 1 each, Rfl: 3  •  losartan (COZAAR) 25 MG tablet, Take 0.5 tablets by mouth Daily., Disp: 45 tablet, Rfl: 2  •  metoprolol succinate XL (TOPROL-XL) 25 MG 24 hr tablet, Take 0.5 tablets by mouth Daily., Disp: , Rfl:   •  ondansetron (ZOFRAN) 4 MG tablet, Take 1 tablet by mouth As Needed., Disp: , Rfl:     Current Facility-Administered Medications:   •  lidocaine (XYLOCAINE) 2 % jelly, , Topical, PRN, Jenny Mccauley, VERONICA    Allergies:   No Known Allergies      Objective       Vital Signs:   Vitals:    21 1300   BP: 156/48   BP Location: Left arm   Patient Position: Sitting   Pulse: 52   Resp: 16   Temp: 98.6 °F (37 °C)   TempSrc: Temporal   SpO2: 98%   Weight: 76.7 kg (169 lb 1.6 oz)     Body mass index is  24.97 kg/m².     Current Total XRT Dose (cGY): XRT Dose (cGy): 6200    Plan      Plan:   Patient was seen today for an on treatment visit. Patient is receiving radiation therapy to the parotid. Patient is stable and tolerating radiation therapy well.    Moist desquamation on right neck and dysphagia. Okay oral cavity hygenie.    I have reviewed treatment setup notes, checked and approved the daily guidance images. I reviewed dose delivery, treatment parameters and deemed them appropriate.     Continue silvadene, lidocaine cream, treatment break till this Friday. If patient feels ready he can resume on this Friday, other wise keep the break.    Resume radiation next Monday.    I, Garrett Dupree MD, personally performed the services described in this documentation as scribed by the above named individual in my presence, and it is both accurate and complete.  11/9/2021  13:51 EST     Electronically signed by Garrett Dupree MD, 11/09/21, 1:51 PM EST.    Scribed for Dr. Garrett Dupree MD by VERONICA Duff 11/9/2021  13:49 EST

## 2021-11-10 PROCEDURE — 77336 RADIATION PHYSICS CONSULT: CPT | Performed by: RADIOLOGY

## 2021-11-15 PROCEDURE — 77014 CHG CT GUIDANCE RADIATION THERAPY FLDS PLACEMENT: CPT | Performed by: RADIOLOGY

## 2021-11-15 PROCEDURE — 77386: CPT | Performed by: RADIOLOGY

## 2021-11-16 ENCOUNTER — RADIATION ONCOLOGY WEEKLY ASSESSMENT (OUTPATIENT)
Dept: RADIATION ONCOLOGY | Facility: HOSPITAL | Age: 86
End: 2021-11-16

## 2021-11-16 VITALS
DIASTOLIC BLOOD PRESSURE: 52 MMHG | HEART RATE: 60 BPM | BODY MASS INDEX: 24.93 KG/M2 | RESPIRATION RATE: 16 BRPM | TEMPERATURE: 98.4 F | SYSTOLIC BLOOD PRESSURE: 119 MMHG | OXYGEN SATURATION: 98 % | WEIGHT: 168.8 LBS

## 2021-11-16 DIAGNOSIS — C76.0 HEAD AND NECK CANCER (HCC): Primary | ICD-10-CM

## 2021-11-16 PROCEDURE — 77427 RADIATION TX MANAGEMENT X5: CPT | Performed by: RADIOLOGY

## 2021-11-16 PROCEDURE — 77386: CPT | Performed by: RADIOLOGY

## 2021-11-16 PROCEDURE — 77014 CHG CT GUIDANCE RADIATION THERAPY FLDS PLACEMENT: CPT | Performed by: RADIOLOGY

## 2021-11-16 NOTE — PROGRESS NOTES
OTV Note      Patient Name: Clinton Ramirez  : 1935   MRN: [id number]     Diagnosis:  Lung Cancer  Staging: 3 T1N1 SCC    This patient was seen today for an on treatment visit. The patient is receiving radiation treatment to the parotid gland. The patient has received XRT Dose (cGy): 6600 cGy in 33 fractions out of a planned dose of 7000 cGy in 35 fractions.       Subjective      Review of Systems:   Review of Systems   Constitutional: Negative.    HENT: Negative.  Negative for mouth sores, sore throat and trouble swallowing.    Eyes: Negative.    Respiratory: Negative.  Negative for shortness of breath.    Cardiovascular: Negative.    Gastrointestinal: Negative.    Endocrine: Negative.    Genitourinary: Negative.    Musculoskeletal: Negative.  Negative for neck pain and neck stiffness.   Skin: Positive for color change and dry skin.   Neurological: Negative.    Hematological: Negative.    Psychiatric/Behavioral: Negative.      Review of Systems   Constitutional: Negative.    HENT:  Negative.  Negative for mouth sores, sore throat and trouble swallowing.    Eyes: Negative.    Respiratory: Negative.  Negative for shortness of breath.    Cardiovascular: Negative.    Gastrointestinal: Negative.    Endocrine: Negative.    Genitourinary: Negative.     Musculoskeletal: Negative.  Negative for neck pain and neck stiffness.   Skin: Positive for color change.   Neurological: Negative.    Hematological: Negative.    Psychiatric/Behavioral: Negative.        Medications:     Current Outpatient Medications:   •  aspirin 81 MG chewable tablet, Chew 81 mg Every Morning., Disp: , Rfl:   •  atorvastatin (LIPITOR) 40 MG tablet, TAKE 1 TABLET DAILY, Disp: 90 tablet, Rfl: 3  •  donepezil (ARICEPT) 10 MG tablet, TAKE 1 TABLET EVERY NIGHT, Disp: 90 tablet, Rfl: 3  •  felodipine (PLENDIL) 2.5 MG 24 hr tablet, Take 2.5 mg by mouth Daily. 3 tabs daily, Disp: 270 tablet, Rfl: 1  •  ibuprofen (ADVIL,MOTRIN) 200 MG tablet,  Take 200 mg by mouth Every 6 (Six) Hours As Needed for Mild Pain ., Disp: , Rfl:   •  lidocaine (XYLOCAINE) 2 % jelly, Apply  topically to the appropriate area as directed As Needed for Mild Pain ., Disp: 1 each, Rfl: 3  •  losartan (COZAAR) 25 MG tablet, Take 0.5 tablets by mouth Daily., Disp: 45 tablet, Rfl: 2  •  metoprolol succinate XL (TOPROL-XL) 25 MG 24 hr tablet, Take 0.5 tablets by mouth Daily., Disp: , Rfl:   •  ondansetron (ZOFRAN) 4 MG tablet, Take 1 tablet by mouth As Needed., Disp: , Rfl:     Current Facility-Administered Medications:   •  lidocaine (XYLOCAINE) 2 % jelly, , Topical, PRN, Jenny Mccauley APRN    Allergies:   No Known Allergies      Objective       Vital Signs:   Vitals:    11/16/21 1300   BP: 119/52   BP Location: Left arm   Patient Position: Sitting   Pulse: 60   Resp: 16   Temp: 98.4 °F (36.9 °C)   TempSrc: Temporal   SpO2: 98%   Weight: 76.6 kg (168 lb 12.8 oz)     Body mass index is 24.93 kg/m².     Current Total XRT Dose (cGY): XRT Dose (cGy): 6600    Plan      Plan:   Patient was seen today for an on treatment visit. Patient is receiving radiation therapy to the parotid gland. Patient is stable and tolerating radiation therapy well with minimal side effects.  No new complaints today.  The moist desquamation on his neck has improved greatly, continue with Silvadene and lidocaine cream as needed.  Oral hygiene is good today.  Continue with radiation therapy.     I have reviewed treatment setup notes, checked and approved the daily guidance images. I reviewed dose delivery, treatment parameters and deemed them appropriate. We plan to continue radiation therapy as prescribed.     I, Garrett Dupree MD, personally performed the services described in this documentation as scribed by the above named individual in my presence, and it is both accurate and complete.  11/16/2021  14:28 EST     Electronically signed by Garrett Dupree MD, 11/16/21, 2:28 PM EST.      Scribed for Dr. Garrett Dupree MD  by Jenny Mccauley, VERONICA 11/16/2021  14:06 EST

## 2021-11-17 PROCEDURE — 77386: CPT | Performed by: RADIOLOGY

## 2021-11-17 PROCEDURE — 77336 RADIATION PHYSICS CONSULT: CPT | Performed by: RADIOLOGY

## 2021-11-17 PROCEDURE — 77014 CHG CT GUIDANCE RADIATION THERAPY FLDS PLACEMENT: CPT | Performed by: RADIOLOGY

## 2021-11-18 PROCEDURE — 77386: CPT | Performed by: RADIOLOGY

## 2021-11-18 PROCEDURE — 77014 CHG CT GUIDANCE RADIATION THERAPY FLDS PLACEMENT: CPT | Performed by: RADIOLOGY

## 2021-11-29 DIAGNOSIS — I10 ESSENTIAL HYPERTENSION: ICD-10-CM

## 2021-11-29 NOTE — TELEPHONE ENCOUNTER
Caller: AshleyClinton    Relationship: Self    Best call back number:     Requested Prescriptions:   Requested Prescriptions     Pending Prescriptions Disp Refills   • losartan (COZAAR) 25 MG tablet 45 tablet 2     Sig: Take 0.5 tablets by mouth Daily.        Pharmacy where request should be sent:      [facility]    Additional details provided by patient: RUNNING LOW ABIT    Does the patient have less than a 3 day supply:  [] Yes  [x] No    Bryce Berkowitz Rep   11/29/21 09:18 EST

## 2021-11-30 RX ORDER — LOSARTAN POTASSIUM 25 MG/1
12.5 TABLET ORAL DAILY
Qty: 45 TABLET | Refills: 2 | Status: SHIPPED | OUTPATIENT
Start: 2021-11-30 | End: 2022-08-26 | Stop reason: SDUPTHER

## 2021-12-16 ENCOUNTER — OFFICE VISIT (OUTPATIENT)
Dept: RADIATION ONCOLOGY | Facility: HOSPITAL | Age: 86
End: 2021-12-16

## 2021-12-16 ENCOUNTER — APPOINTMENT (OUTPATIENT)
Dept: RADIATION ONCOLOGY | Facility: HOSPITAL | Age: 86
End: 2021-12-16

## 2021-12-16 VITALS
OXYGEN SATURATION: 97 % | RESPIRATION RATE: 18 BRPM | HEART RATE: 77 BPM | WEIGHT: 167 LBS | DIASTOLIC BLOOD PRESSURE: 49 MMHG | TEMPERATURE: 97.8 F | BODY MASS INDEX: 24.66 KG/M2 | SYSTOLIC BLOOD PRESSURE: 108 MMHG

## 2021-12-16 DIAGNOSIS — C76.0 HEAD AND NECK CANCER (HCC): Primary | ICD-10-CM

## 2021-12-16 PROCEDURE — 99212-NC PR NO CHARGE CBC OFFICE OUTPATIENT VISIT 10 MINUTES

## 2021-12-16 PROCEDURE — G0463 HOSPITAL OUTPT CLINIC VISIT: HCPCS

## 2021-12-16 NOTE — PROGRESS NOTES
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 male who is here today for follow up after completing radiation therapy.  He is accompanied today by his wife.    Clinton Ramirez is a pleasant 86 y.o. male who is here today for consultation regarding radiation therapy. He presents with T1N1 SCC of parotid gland cancer, s/p biopsy and/or excisional biopsy (awaiting further medical records from the patient surgeon). Residual tumor presents.     2021- Pt states that he noticed a lump to the right side of his neck that was not painful. He states that he had noticed it on this date and then presented to his PCP the next day for evaluation of the lump. The patient did not complain of pain except for in his right shoulder.      21-His PCP then ordered an US of the right carotid which showed no acute findings in the arteries of the neck.     2021- His PCP also sent him for a CT of the soft tissues in the neck which showed an enlarged lymph node versus solid inferior right parotid mass and second adjacent borderline lymph node. Pt was referred to ENT at this time.     2021- Pt was seen by ENT specialist Dr. Campuzano who performed a biopsy and an excision, however we do not have official pathology records at this time. Will be in contact with the surgeon office for further documentation. From Dr. Campuzano's note the patient underwent a excision of the tumor on the neck on 8/3/2021. Pathology from this excision showed metastatic carcinoma with histologic and immunohistochemical features consistent with a non-HPV mediated squamous cell carcinoma.      Today the patient is doing well and is accompanied by his wife. He  denies any pain, sore throat, nasal congestion, dizziness, or headaches today.        Interval History:  Today the patient is here for follow-up visit after completing radiation therapy to the parotid gland.  He started on 9/27/2021 and finished on 11/18/2021.  He received 7000 cGy in 35 fractions.  He did well with treatment overall.  Today he has no complaints.     Subjective      Review of Systems:   Review of Systems   Constitutional: Negative.  Negative for fever.   HENT:  Negative.  Negative for mouth sores, sore throat, trouble swallowing and voice change.    Eyes: Negative.    Respiratory: Negative.  Negative for cough and shortness of breath.    Cardiovascular: Negative.  Negative for chest pain.   Gastrointestinal: Negative.    Endocrine: Negative.    Genitourinary: Negative.     Musculoskeletal: Negative.  Negative for neck pain and neck stiffness.   Skin: Negative.  Negative for color change.   Neurological: Negative.    Hematological: Negative.    Psychiatric/Behavioral: Negative.      Review of Systems   Constitutional: Negative.  Negative for fever.   HENT: Negative.  Negative for mouth sores, sore throat, swollen glands, trouble swallowing and voice change.    Eyes: Negative.    Respiratory: Negative.  Negative for cough and shortness of breath.    Cardiovascular: Negative.  Negative for chest pain.   Gastrointestinal: Negative.    Endocrine: Negative.    Genitourinary: Negative.    Musculoskeletal: Negative.  Negative for neck pain and neck stiffness.   Skin: Negative.  Negative for color change and dry skin.   Neurological: Negative.    Hematological: Negative.    Psychiatric/Behavioral: Negative.        I have reviewed and confirmed the accuracy of the ROS as documented by the MA/HARRYN/RN VERONICA Tovar     The following portions of the patient's history were reviewed and updated as appropriate: allergies, current medications, past family history, past medical history, past social  history, past surgical history and problem list.    Past Oncology History:   Oncology/Hematology History    No history exists.        KPS: 90%     Results Review:   The following data was reviewed by: VERONICA Tovar on 12/16/2021:  Common labs    Common Labsle 4/21/21 4/21/21 4/21/21 4/21/21 6/23/21    1049 1049 1049 1049    Glucose  102 (A)      BUN  13      Creatinine  1.08   1.20   eGFR Non African Am  65      Sodium  141      Potassium  4.5      Chloride  104      Calcium  9.2      Albumin  4.00      Total Bilirubin  0.6      Alkaline Phosphatase  95      AST (SGOT)  22      ALT (SGPT)  17      WBC    7.42    Hemoglobin    15.4    Hematocrit    44.1    Platelets    241    Total Cholesterol   131     Triglycerides   109     HDL Cholesterol   44     LDL Cholesterol    67     Hemoglobin A1C 5.36       (A) Abnormal value       Comments are available for some flowsheets but are not being displayed.           Covid Tests    Common Labsle 9/7/21   COVID19 Not Detected               Imaging:     US Carotid Right 5/27/2021  IMPRESSION:    No acute findings in the arteries of the neck.     CT Soft Tissue Neck 6/23/2021  FINDINGS:  There is a right parotid solid mass measuring 1.8 cm and just anterior  there is what appears to be a lymph node which measures 9 mm. Both of  these are best seen on image 42 of the axial series.  No other evidence of adenopathy  No extrinsic deviation of the airway.     IMPRESSION:  Enlarged lymph node versus solid inferior right parotid mass  and second adjacent borderline lymph node.     Pathology:  None available at this time. Path done at a different facility.    Objective     Physical Exam:  Physical Exam  Constitutional:       General: He is not in acute distress.     Appearance: Normal appearance. He is normal weight. He is not ill-appearing.   HENT:      Head: Normocephalic.      Nose: Nose normal.      Mouth/Throat:      Mouth: Mucous membranes are moist.      Pharynx:  Oropharynx is clear.   Eyes:      Extraocular Movements: Extraocular movements intact.      Conjunctiva/sclera: Conjunctivae normal.      Pupils: Pupils are equal, round, and reactive to light.   Neck:     Cardiovascular:      Rate and Rhythm: Normal rate and regular rhythm.      Pulses: Normal pulses.      Heart sounds: Normal heart sounds.   Pulmonary:      Effort: Pulmonary effort is normal. No respiratory distress.      Breath sounds: Normal breath sounds.   Abdominal:      General: Abdomen is flat. Bowel sounds are normal. There is no distension.      Palpations: Abdomen is soft. There is no mass.   Musculoskeletal:         General: No swelling or tenderness. Normal range of motion.      Cervical back: Full passive range of motion without pain and normal range of motion. No edema, erythema or rigidity. No pain with movement. Normal range of motion.   Skin:     General: Skin is warm and dry.      Capillary Refill: Capillary refill takes less than 2 seconds.   Neurological:      General: No focal deficit present.      Mental Status: He is alert and oriented to person, place, and time. Mental status is at baseline.   Psychiatric:         Mood and Affect: Mood normal.         Behavior: Behavior normal.         Thought Content: Thought content normal.         Judgment: Judgment normal.         Vital Signs:   Vitals:    12/16/21 1144   BP: 108/49   Pulse: 77   Resp: 18   Temp: 97.8 °F (36.6 °C)   TempSrc: Temporal   SpO2: 97%   Weight: 75.8 kg (167 lb)   PainSc: 0-No pain     Body mass index is 24.66 kg/m².       Assessment / Plan      Assessment/Plan:   Mr. 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 Residual tumor presents.     The standard of care treatment for salivary gland carcinomas is surgery alone or followed by postoperative RT. Therefore, RT alone is used primarily for patients with advanced-stage, incompletely resectable tumors and for  the small subset of patients with tumors in locations where resection would result in a major functional and/or cosmetic deficit. The 10-year locoregional rates for RT alone for stage I-III is 70%.    Today the patient is here for follow-up visit after completing radiation therapy to the parotid gland and left sided LN's.  He started on 9/27/2021 and finished on 11/18/2021.  He received 7000 cGy in 35 fractions.  He did well with treatment overall.  Today he has no complaints overall.  No complaints of neck pain or stiffness, trouble swallowing, sore throat, oral lesions, voice change, or skin irritation.  The skin on his neck looks great and no open areas or dry skin noted, continue current skin care routine.  He does have a small area of what I think is fibrous tissue on the right side of the neck, we will monitor this.  Oral hygiene looks good today.    Instructed the patient and his wife to obtain a referral from his PCP to an ENT physician.  They are unable to drive to see the ENT that he was seeing prior.  We will perform a PET in approximately 3 months and then the patient is to see us for follow-up appointment after that.      Follow Up:   Return in about 3 months (around 3/23/2022) for Office Visit. After PET has been performed.     Jenny Mccauley, APRN  12/16/21 12:27 EST

## 2021-12-28 DIAGNOSIS — I10 ESSENTIAL HYPERTENSION: ICD-10-CM

## 2021-12-28 RX ORDER — FELODIPINE 2.5 MG/1
TABLET, EXTENDED RELEASE ORAL
Qty: 270 TABLET | Refills: 3 | Status: SHIPPED | OUTPATIENT
Start: 2021-12-28 | End: 2022-12-28 | Stop reason: SDUPTHER

## 2021-12-30 ENCOUNTER — OFFICE VISIT (OUTPATIENT)
Dept: FAMILY MEDICINE CLINIC | Facility: CLINIC | Age: 86
End: 2021-12-30

## 2021-12-30 VITALS
RESPIRATION RATE: 16 BRPM | SYSTOLIC BLOOD PRESSURE: 136 MMHG | TEMPERATURE: 98.4 F | DIASTOLIC BLOOD PRESSURE: 72 MMHG | WEIGHT: 174 LBS | OXYGEN SATURATION: 98 % | BODY MASS INDEX: 25.77 KG/M2 | HEART RATE: 71 BPM | HEIGHT: 69 IN

## 2021-12-30 DIAGNOSIS — E78.00 HYPERCHOLESTEROLEMIA: Chronic | ICD-10-CM

## 2021-12-30 DIAGNOSIS — I10 ESSENTIAL HYPERTENSION: Primary | ICD-10-CM

## 2021-12-30 DIAGNOSIS — C07 CANCER OF PAROTID GLAND (HCC): ICD-10-CM

## 2021-12-30 DIAGNOSIS — R41.3 MEMORY IMPAIRMENT OF GRADUAL ONSET: Chronic | ICD-10-CM

## 2021-12-30 PROCEDURE — 85025 COMPLETE CBC W/AUTO DIFF WBC: CPT | Performed by: FAMILY MEDICINE

## 2021-12-30 PROCEDURE — 80061 LIPID PANEL: CPT | Performed by: FAMILY MEDICINE

## 2021-12-30 PROCEDURE — 80053 COMPREHEN METABOLIC PANEL: CPT | Performed by: FAMILY MEDICINE

## 2021-12-30 PROCEDURE — 99214 OFFICE O/P EST MOD 30 MIN: CPT | Performed by: FAMILY MEDICINE

## 2021-12-30 NOTE — PROGRESS NOTES
Subjective   Clinton Ramirez is a 86 y.o. male.     History of Present Illness follow-up regarding hypertension dyslipidemia.  The cognitive impairment has stabilized.  Did have the ENT visit parotid surgery then followed that with radiation therapy which has been completed.  Needs referral back to ENT for follow-up.  At present generally feels pretty good utilizing all medications as reconciled.  Denies respiratory CDV GI  orthopedic skin concerns.  No difficulty with swallowing.  Did not have any skin challenges after the radiation therapy.    The following portions of the patient's history were reviewed and updated as appropriate: allergies, current medications, past medical history, past social history, past surgical history and problem list.    Review of Systems  See history of Present Illness     Objective     Physical Exam  Vitals reviewed.   Constitutional:       Appearance: Normal appearance.   HENT:      Head: Normocephalic.   Eyes:      Conjunctiva/sclera: Conjunctivae normal.   Cardiovascular:      Rate and Rhythm: Normal rate and regular rhythm.      Heart sounds: Normal heart sounds.   Pulmonary:      Effort: Pulmonary effort is normal.      Breath sounds: Normal breath sounds.   Musculoskeletal:      Cervical back: Normal range of motion and neck supple. No tenderness.   Lymphadenopathy:      Cervical: No cervical adenopathy.   Skin:     General: Skin is warm and dry.   Neurological:      Mental Status: He is alert and oriented to person, place, and time.   Psychiatric:         Mood and Affect: Mood normal.         PHQ-9 Total Score:      Body mass index is 25.7 kg/m².       Assessment/Plan     Diagnoses and all orders for this visit:    1. Essential hypertension (Primary)  -     CBC & Differential  -     Comprehensive Metabolic Panel  -     Lipid Panel    2. Memory impairment of gradual onset    3. Hypercholesterolemia  -     Comprehensive Metabolic Panel  -     Lipid Panel    4. Cancer of  parotid gland (HCC)  -     Ambulatory Referral to ENT (Otolaryngology)    You have had vaccines.  BP looks good.  Today will check labs.  We will continue medications as reconciled ordered.  Will refer you back to ENT to visit outpatient.  Please keep follow-up with radiation oncology for planned follow-up imaging in a few months.  Stay safely active maintain reasonable diet.  Maintain pandemic response.  Recheck here in about 4 months or as needed.                     This document has been electronically signed by Nolberto Navarro MD   December 30, 2021 12:27 EST    Part of this note may be an electronic transcription/translation of spoken language to printed text using the Dragon Dictation System.

## 2021-12-31 LAB
ALBUMIN SERPL-MCNC: 4.1 G/DL (ref 3.5–5.2)
ALBUMIN/GLOB SERPL: 1.4 G/DL
ALP SERPL-CCNC: 87 U/L (ref 39–117)
ALT SERPL W P-5'-P-CCNC: 25 U/L (ref 1–41)
ANION GAP SERPL CALCULATED.3IONS-SCNC: 10.5 MMOL/L (ref 5–15)
AST SERPL-CCNC: 27 U/L (ref 1–40)
BASOPHILS # BLD AUTO: 0.05 10*3/MM3 (ref 0–0.2)
BASOPHILS NFR BLD AUTO: 0.6 % (ref 0–1.5)
BILIRUB SERPL-MCNC: 0.6 MG/DL (ref 0–1.2)
BUN SERPL-MCNC: 14 MG/DL (ref 8–23)
BUN/CREAT SERPL: 17.7 (ref 7–25)
CALCIUM SPEC-SCNC: 9.1 MG/DL (ref 8.6–10.5)
CHLORIDE SERPL-SCNC: 103 MMOL/L (ref 98–107)
CHOLEST SERPL-MCNC: 125 MG/DL (ref 0–200)
CO2 SERPL-SCNC: 24.5 MMOL/L (ref 22–29)
CREAT SERPL-MCNC: 0.79 MG/DL (ref 0.76–1.27)
DEPRECATED RDW RBC AUTO: 41.9 FL (ref 37–54)
EOSINOPHIL # BLD AUTO: 0.16 10*3/MM3 (ref 0–0.4)
EOSINOPHIL NFR BLD AUTO: 1.8 % (ref 0.3–6.2)
ERYTHROCYTE [DISTWIDTH] IN BLOOD BY AUTOMATED COUNT: 12.1 % (ref 12.3–15.4)
GFR SERPL CREATININE-BSD FRML MDRD: 93 ML/MIN/1.73
GLOBULIN UR ELPH-MCNC: 2.9 GM/DL
GLUCOSE SERPL-MCNC: 86 MG/DL (ref 65–99)
HCT VFR BLD AUTO: 45.5 % (ref 37.5–51)
HDLC SERPL-MCNC: 44 MG/DL (ref 40–60)
HGB BLD-MCNC: 15.1 G/DL (ref 13–17.7)
IMM GRANULOCYTES # BLD AUTO: 0.03 10*3/MM3 (ref 0–0.05)
IMM GRANULOCYTES NFR BLD AUTO: 0.3 % (ref 0–0.5)
LDLC SERPL CALC-MCNC: 55 MG/DL (ref 0–100)
LDLC/HDLC SERPL: 1.14 {RATIO}
LYMPHOCYTES # BLD AUTO: 2.14 10*3/MM3 (ref 0.7–3.1)
LYMPHOCYTES NFR BLD AUTO: 24.3 % (ref 19.6–45.3)
MCH RBC QN AUTO: 31.3 PG (ref 26.6–33)
MCHC RBC AUTO-ENTMCNC: 33.2 G/DL (ref 31.5–35.7)
MCV RBC AUTO: 94.2 FL (ref 79–97)
MONOCYTES # BLD AUTO: 0.84 10*3/MM3 (ref 0.1–0.9)
MONOCYTES NFR BLD AUTO: 9.6 % (ref 5–12)
NEUTROPHILS NFR BLD AUTO: 5.57 10*3/MM3 (ref 1.7–7)
NEUTROPHILS NFR BLD AUTO: 63.4 % (ref 42.7–76)
NRBC BLD AUTO-RTO: 0 /100 WBC (ref 0–0.2)
PLATELET # BLD AUTO: 214 10*3/MM3 (ref 140–450)
PMV BLD AUTO: 12.6 FL (ref 6–12)
POTASSIUM SERPL-SCNC: 4.4 MMOL/L (ref 3.5–5.2)
PROT SERPL-MCNC: 7 G/DL (ref 6–8.5)
RBC # BLD AUTO: 4.83 10*6/MM3 (ref 4.14–5.8)
SODIUM SERPL-SCNC: 138 MMOL/L (ref 136–145)
TRIGL SERPL-MCNC: 155 MG/DL (ref 0–150)
VLDLC SERPL-MCNC: 26 MG/DL (ref 5–40)
WBC NRBC COR # BLD: 8.79 10*3/MM3 (ref 3.4–10.8)

## 2022-01-28 ENCOUNTER — TELEPHONE (OUTPATIENT)
Dept: FAMILY MEDICINE CLINIC | Facility: CLINIC | Age: 87
End: 2022-01-28

## 2022-01-28 NOTE — TELEPHONE ENCOUNTER
PATIENT SAW YOGESH RAMIREZ YESTERDAY AND THEY RECOMMENDED PATIENT TO SEE A DERMATOLOGIST FOR SPOTS ON THE FACE.      PLEASE CALL [phone]

## 2022-01-29 DIAGNOSIS — L98.9 SKIN LESION OF FACE: Primary | ICD-10-CM

## 2022-02-02 ENCOUNTER — PATIENT ROUNDING (BHMG ONLY) (OUTPATIENT)
Dept: FAMILY MEDICINE CLINIC | Facility: CLINIC | Age: 87
End: 2022-02-02

## 2022-02-02 NOTE — PROGRESS NOTES
February 2, 2022    Hello, may I speak with Clinton Ramirez?    My name is Geoffrey    I am  with [facility]  [address] [address] [phone].     Spoke with patient, he is very happy with the practice and the care he recieves here.  Everyone is always very alanis and helpful.  He has not complaints and is very appreciative of all the staff.      Thank you, and have a great day.

## 2022-02-22 ENCOUNTER — OFFICE VISIT (OUTPATIENT)
Dept: CARDIOLOGY | Facility: CLINIC | Age: 87
End: 2022-02-22

## 2022-02-22 VITALS
BODY MASS INDEX: 24.29 KG/M2 | HEART RATE: 53 BPM | SYSTOLIC BLOOD PRESSURE: 138 MMHG | TEMPERATURE: 96.8 F | HEIGHT: 69 IN | OXYGEN SATURATION: 98 % | DIASTOLIC BLOOD PRESSURE: 60 MMHG | WEIGHT: 164 LBS

## 2022-02-22 DIAGNOSIS — I47.1 PAROXYSMAL SVT (SUPRAVENTRICULAR TACHYCARDIA): Primary | ICD-10-CM

## 2022-02-22 DIAGNOSIS — I10 ESSENTIAL HYPERTENSION: Chronic | ICD-10-CM

## 2022-02-22 DIAGNOSIS — I25.10 CORONARY ARTERY DISEASE INVOLVING NATIVE CORONARY ARTERY OF NATIVE HEART WITHOUT ANGINA PECTORIS: Chronic | ICD-10-CM

## 2022-02-22 DIAGNOSIS — R00.1 BRADYCARDIA, SINUS: ICD-10-CM

## 2022-02-22 DIAGNOSIS — I38 VALVULAR HEART DISEASE: Chronic | ICD-10-CM

## 2022-02-22 PROCEDURE — 99214 OFFICE O/P EST MOD 30 MIN: CPT | Performed by: INTERNAL MEDICINE

## 2022-02-22 NOTE — PROGRESS NOTES
subjective     Chief Complaint   Patient presents with   • Coronary Artery Disease     follow up   • Slow Heart Rate     follow up      History of Present Illness  Patient is 86 years old white male who is here for cardiology follow-up.  Patient has history of paroxysmal supraventricular tachycardia with fast ventricular rate around 160 bpm.  Frequent episodes however he also gets bradycardia down to 49 bpm on Toprol-XL 12.5 daily.  Heart rate today is forty-nine but patient is totally asymptomatic but without Toprol he gets rapid heart rate.  He denies any chest pain palpitations or shortness of breath.  He also has a history of mild to moderate aortic stenosis.  No evidence of heart failure no dizziness syncope or presyncope.  Patient also has nonobstructive coronary artery disease.  Blood pressure is very well controlled with current medications.    Hyperlipidemia on Lipitor therapy    Past Surgical History:   Procedure Laterality Date   • BIOPSY PROSTATE NEEDLE / PUNCH / INCISIONAL     • CARDIAC CATHETERIZATION N/A 7/11/2018    Procedure: Left Heart Cath;  Surgeon: Claire Scott MD;  Location: [facility] INVASIVE LOCATION;  Service: Cardiovascular   • COLONOSCOPY     • EAR BIOPSY      basal cell skin cancer   • SKIN CANCER EXCISION      melanoma from neck    • THROAT SURGERY      Biopsy     Family History   Problem Relation Age of Onset   • Pancreatic cancer Sister    • Tuberculosis Other    • Diabetes Other    • Heart disease Father    • Lung cancer Brother    • Leukemia Sister      Past Medical History:   Diagnosis Date   • Arthritis     hands   • Elevated cholesterol    • Hypertension    • Hypokalemia    • Multiple benign polyps of large intestine    • Need for prophylactic vaccination and inoculation against influenza    • Pancreatitis    • Paralytic ileus (HCC)    • PPD positive     negative chest xray    • Renal insufficiency     mild-told to stop Naproxen   • Rheumatic fever     age 20 while in  -hospitalized    • Skin cancer     basal cell from ears, melanoma from neck area    • Valvular disease    • Wears glasses    • Wears partial dentures     upper      Patient Active Problem List   Diagnosis   • Bone spur   • Cervical radiculopathy   • Elevated prostate specific antigen (PSA)   • Hypercholesterolemia   • Essential hypertension   • Impaired fasting glucose   • Osteoarthritis   • Recent skin changes   • Tinnitus   • Heart murmur   • Valvular heart disease, mild to moderate AS   • Angina pectoris (HCC)   • Dyspnea on exertion   • Right bundle branch block   • Sinus arrhythmia   • Abnormal stress test   • History of kidney disease   • Coronary artery disease, 50% mid circumflex lesion and nonobstructive PDA lesion   • Bradycardia, sinus   • Palpitations   • Memory impairment of gradual onset   • Paroxysmal SVT (supraventricular tachycardia) (HCC)   • Cancer of parotid gland (HCC)       Social History     Tobacco Use   • Smoking status: Former Smoker     Packs/day: 3.00     Years: 11.00     Pack years: 33.00     Types: Cigarettes     Quit date:      Years since quittin.1   • Smokeless tobacco: Never Used   Vaping Use   • Vaping Use: Never used   Substance Use Topics   • Alcohol use: Yes     Alcohol/week: 1.0 - 2.0 standard drink     Types: 1 - 2 Cans of beer per week     Comment: 1 beer daily sometimes 2    • Drug use: No       No Known Allergies    Current Outpatient Medications on File Prior to Visit   Medication Sig   • aspirin 81 MG chewable tablet Chew 81 mg Every Morning.   • atorvastatin (LIPITOR) 40 MG tablet TAKE 1 TABLET DAILY   • donepezil (ARICEPT) 10 MG tablet TAKE 1 TABLET EVERY NIGHT   • felodipine (PLENDIL) 2.5 MG 24 hr tablet TAKE 3 TABLETS DAILY (Patient taking differently: Take 2.5 mg by mouth 2 (Two) Times a Day.)   • ibuprofen (ADVIL,MOTRIN) 200 MG tablet Take 200 mg by mouth Every 6 (Six) Hours As Needed for Mild Pain .   • lidocaine (XYLOCAINE) 2 % jelly Apply   "topically to the appropriate area as directed As Needed for Mild Pain .   • losartan (COZAAR) 25 MG tablet Take 0.5 tablets by mouth Daily.   • metoprolol succinate XL (TOPROL-XL) 25 MG 24 hr tablet Take 0.5 tablets by mouth Daily.   • ondansetron (ZOFRAN) 4 MG tablet Take 1 tablet by mouth As Needed.     Current Facility-Administered Medications on File Prior to Visit   Medication   • lidocaine (XYLOCAINE) 2 % jelly         The following portions of the patient's history were reviewed and updated as appropriate: allergies, current medications, past family history, past medical history, past social history, past surgical history and problem list.    Review of Systems   Constitutional: Negative.   HENT: Negative.  Negative for congestion.    Eyes: Negative.    Cardiovascular: Negative.  Negative for chest pain, cyanosis, dyspnea on exertion, irregular heartbeat, leg swelling, near-syncope, orthopnea, palpitations, paroxysmal nocturnal dyspnea and syncope.   Respiratory: Negative.  Negative for shortness of breath.    Hematologic/Lymphatic: Negative.    Musculoskeletal: Negative.    Gastrointestinal: Negative.    Neurological: Negative.  Negative for headaches.          Objective:     /60 (BP Location: Left arm, Patient Position: Sitting, Cuff Size: Adult)   Pulse 53   Temp 96.8 °F (36 °C)   Ht 175.3 cm (69\")   Wt 74.4 kg (164 lb)   SpO2 98%   BMI 24.22 kg/m²   Pulmonary:      Effort: Pulmonary effort is normal.      Breath sounds: Normal breath sounds. No stridor. No wheezing. No rhonchi. No rales.   Cardiovascular:      PMI at left midclavicular line. Bradycardia present. Regular rhythm. Normal S1. Normal S2.      Murmurs: There is no murmur.      No gallop. No click. No rub.   Pulses:     Intact distal pulses.   Edema:     Peripheral edema absent.           Lab Review  Lab Results   Component Value Date     12/30/2021    K 4.4 12/30/2021     12/30/2021    BUN 14 12/30/2021    CREATININE 0.79 " 12/30/2021    GLUCOSE 86 12/30/2021    CALCIUM 9.1 12/30/2021    ALT 25 12/30/2021    ALKPHOS 87 12/30/2021    LABIL2 1.3 (L) 05/12/2016     Lab Results   Component Value Date    CKTOTAL 43 03/07/2019     Lab Results   Component Value Date    WBC 8.79 12/30/2021    HGB 15.1 12/30/2021    HCT 45.5 12/30/2021     12/30/2021     No results found for: INR  No results found for: MG  Lab Results   Component Value Date    TSH 4.300 (H) 04/21/2021     No results found for: BNP  Lab Results   Component Value Date    CHLPL 166 05/12/2016    CHOL 125 12/30/2021    TRIG 155 (H) 12/30/2021    HDL 44 12/30/2021    VLDL 26 12/30/2021    LDLHDL 1.14 12/30/2021     Lab Results   Component Value Date    LDL 55 12/30/2021    LDL 67 04/21/2021         ECG 12 Lead    Date/Time: 2/24/2022 12:37 PM  Performed by: Sandra Torres MD  Authorized by: Sandra Torres MD   Comparison: compared with previous ECG from 10/1/2021  Similar to previous ECG  Rhythm: sinus bradycardia  Rate: bradycardic  BPM: 49  Conduction: right bundle branch block  QRS axis: normal  Other findings: non-specific ST-T wave changes    Clinical impression: abnormal EKG               I personally viewed and interpreted the patient's LAB data         Assessment:     1. Paroxysmal SVT (supraventricular tachycardia) (HCC)    2. Coronary artery disease involving native coronary artery of native heart without angina pectoris    3. Bradycardia, sinus    4. Essential hypertension    5. Valvular heart disease, mild to moderate AS          Plan:     Denies history of paroxysmal supraventricular tachycardia and is taking low-dose metoprolol ER 12.5 mg daily.  Heart rate is 49 bpm but patient is asymptomatic he was advised to continue metoprolol    Nonobstructive coronary artery disease patient denies any chest pain.    Hypertension very well controlled he will continue Plendil losartan and metoprolol.    History of mild to moderate aortic stenosis patient  is asymptomatic.  Healthy lifestyle emphasized   EKG today does not show any acute changes, sinus bradycardia and right bundle branch block noted.    Follow-up scheduled        No follow-ups on file.

## 2022-02-24 PROCEDURE — 93000 ELECTROCARDIOGRAM COMPLETE: CPT | Performed by: INTERNAL MEDICINE

## 2022-03-04 ENCOUNTER — HOSPITAL ENCOUNTER (OUTPATIENT)
Dept: PET IMAGING | Facility: HOSPITAL | Age: 87
Discharge: HOME | End: 2022-03-04

## 2022-03-04 DIAGNOSIS — C76.0 HEAD AND NECK CANCER: ICD-10-CM

## 2022-03-04 PROCEDURE — A9552 F18 FDG: HCPCS

## 2022-03-04 PROCEDURE — 0 FLUDEOXYGLUCOSE F18 SOLUTION

## 2022-03-04 PROCEDURE — 78815 PET IMAGE W/CT SKULL-THIGH: CPT

## 2022-03-04 PROCEDURE — 78815 PET IMAGE W/CT SKULL-THIGH: CPT | Performed by: RADIOLOGY

## 2022-03-04 RX ADMIN — FLUDEOXYGLUCOSE F18 1 DOSE: 300 INJECTION INTRAVENOUS at 07:55

## 2022-03-07 RX ORDER — METOPROLOL SUCCINATE 25 MG/1
TABLET, EXTENDED RELEASE ORAL
Qty: 90 TABLET | Refills: 3 | Status: SHIPPED | OUTPATIENT
Start: 2022-03-07 | End: 2022-05-13

## 2022-03-07 NOTE — TELEPHONE ENCOUNTER
You have previously ordered this for him and were the last to order per the chart. Please advise

## 2022-03-16 ENCOUNTER — OFFICE VISIT (OUTPATIENT)
Dept: FAMILY MEDICINE CLINIC | Facility: CLINIC | Age: 87
End: 2022-03-16

## 2022-03-16 VITALS
TEMPERATURE: 97.5 F | DIASTOLIC BLOOD PRESSURE: 51 MMHG | OXYGEN SATURATION: 97 % | HEART RATE: 60 BPM | HEIGHT: 69 IN | RESPIRATION RATE: 18 BRPM | WEIGHT: 166 LBS | BODY MASS INDEX: 24.59 KG/M2 | SYSTOLIC BLOOD PRESSURE: 111 MMHG

## 2022-03-16 DIAGNOSIS — M79.601 PAIN OF RIGHT UPPER EXTREMITY: ICD-10-CM

## 2022-03-16 DIAGNOSIS — M25.511 ACUTE PAIN OF RIGHT SHOULDER: Primary | ICD-10-CM

## 2022-03-16 PROCEDURE — 99213 OFFICE O/P EST LOW 20 MIN: CPT | Performed by: INTERNAL MEDICINE

## 2022-03-16 NOTE — PROGRESS NOTES
Patient Name: Clinton Ramirez Today's Date: 3/16/2022   Patient MRN / CSN: [id number] / [id number] Date of Encounter: 3/16/2022   Patient Age / : 86 y.o. / 1935 Encounter Provider: Ewelina Chowdary DO   Referring Physician: No ref. provider found          Clinton is a 86 y.o. male who is being seen today for Arm Pain      Mr. Ramirez presents today for an acute visit with complaints of right shoulder and arm pain.  He reports sliding down on some grass 2 days ago.  He woke up the next morning with the inability to move his right upper extremity due to pain in his shoulder and upper arm.  He reports the shoulder pain radiated down to almost the elbow.  He denies hitting his arm whenever he fell.  He denies falling on an outstretched arm.  He has not sought medical attention or had any imaging since this episode happened.  He does feel that ibuprofen is helping, as taken once a day.  He denies any associated chest pain, shortness of air, or recent fevers.      Allergies include:Patient has no known allergies.  Current Outpatient Medications   Medication Sig Dispense Refill   • aspirin 81 MG chewable tablet Chew 81 mg Every Morning.     • atorvastatin (LIPITOR) 40 MG tablet TAKE 1 TABLET DAILY 90 tablet 3   • donepezil (ARICEPT) 10 MG tablet TAKE 1 TABLET EVERY NIGHT 90 tablet 3   • felodipine (PLENDIL) 2.5 MG 24 hr tablet TAKE 3 TABLETS DAILY (Patient taking differently: Take 2.5 mg by mouth 2 (Two) Times a Day.) 270 tablet 3   • ibuprofen (ADVIL,MOTRIN) 200 MG tablet Take 200 mg by mouth Every 6 (Six) Hours As Needed for Mild Pain .     • lidocaine (XYLOCAINE) 2 % jelly Apply  topically to the appropriate area as directed As Needed for Mild Pain . 1 each 3   • losartan (COZAAR) 25 MG tablet Take 0.5 tablets by mouth Daily. 45 tablet 2   • metoprolol succinate XL (TOPROL-XL) 25 MG 24 hr tablet TAKE ONE-HALF (1/2) TABLET DAILY 90 tablet 3   • ondansetron (ZOFRAN) 4 MG tablet Take 1 tablet by mouth As Needed.        Current Facility-Administered Medications   Medication Dose Route Frequency Provider Last Rate Last Admin   • lidocaine (XYLOCAINE) 2 % jelly   Topical PRN Jenny Mccauley APRN         Past Medical History:   Diagnosis Date   • Arthritis     hands   • Elevated cholesterol    • Hypertension    • Hypokalemia    • Multiple benign polyps of large intestine    • Need for prophylactic vaccination and inoculation against influenza    • Pancreatitis    • Paralytic ileus (HCC)    • PPD positive     negative chest xray    • Renal insufficiency     mild-told to stop Naproxen   • Rheumatic fever     age 20 while in -hospitalized    • Skin cancer     basal cell from ears, melanoma from neck area    • Valvular disease    • Wears glasses    • Wears partial dentures     upper      Family History   Problem Relation Age of Onset   • Pancreatic cancer Sister    • Tuberculosis Other    • Diabetes Other    • Heart disease Father    • Lung cancer Brother    • Leukemia Sister      Past Surgical History:   Procedure Laterality Date   • BIOPSY PROSTATE NEEDLE / PUNCH / INCISIONAL     • CARDIAC CATHETERIZATION N/A 2018    Procedure: Left Heart Cath;  Surgeon: Claire Scott MD;  Location: [facility] CATH INVASIVE LOCATION;  Service: Cardiovascular   • COLONOSCOPY     • EAR BIOPSY      basal cell skin cancer   • SKIN CANCER EXCISION      melanoma from neck    • THROAT SURGERY      Biopsy     Social History     Substance and Sexual Activity   Alcohol Use Yes   • Alcohol/week: 1.0 - 2.0 standard drink   • Types: 1 - 2 Cans of beer per week    Comment: 1 beer daily sometimes 2      Social History     Tobacco Use   Smoking Status Former Smoker   • Packs/day: 3.00   • Years: 11.00   • Pack years: 33.00   • Types: Cigarettes   • Quit date:    • Years since quittin.2   Smokeless Tobacco Never Used     Social History     Substance and Sexual Activity   Drug Use No     Review of Systems   Constitutional: Negative for fever.  "  Respiratory: Negative for shortness of breath.    Cardiovascular: Negative for chest pain.   Gastrointestinal: Positive for nausea. Negative for abdominal pain.        Occasional nausea at times.   Musculoskeletal: Positive for arthralgias.        Depression Assessment Review:  PHQ-9 Total Score: 0  Vital Signs & Measurements Taken This Encounter  /51 (BP Location: Left arm, Patient Position: Sitting, Cuff Size: Adult)   Pulse 60   Temp 97.5 °F (36.4 °C) (Temporal)   Resp 18   Ht 175.3 cm (69.02\")   Wt 75.3 kg (166 lb)   SpO2 97%   BMI 24.50 kg/m²    SpO2 Percentage    03/16/22 1516   SpO2: 97%        Physical Exam  Vitals reviewed.   Constitutional:       General: He is not in acute distress.  HENT:      Head: Normocephalic and atraumatic.   Eyes:      Extraocular Movements: Extraocular movements intact.      Conjunctiva/sclera: Conjunctivae normal.      Pupils: Pupils are equal, round, and reactive to light.   Cardiovascular:      Rate and Rhythm: Normal rate and regular rhythm.   Pulmonary:      Effort: Pulmonary effort is normal. No respiratory distress.      Breath sounds: Normal breath sounds. No wheezing or rhonchi.   Musculoskeletal:      Comments: 5/5 muscle strength demonstrated in the upper extremities bilaterally.  Normal and symmetric  strength bilaterally.  Tenderness to palpation of the right anterior shoulder.  Active range of motion of the right shoulder limited to 90 degrees in flexion and abduction due to pain.  Positive crepitus noted in the shoulder.  Normal range of motion of the right elbow without grimace.   Skin:     General: Skin is warm and dry.      Coloration: Skin is not jaundiced.   Neurological:      Mental Status: He is alert.   Psychiatric:         Mood and Affect: Mood normal.         Behavior: Behavior normal.           Assessment & Plan  Patient Active Problem List   Diagnosis   • Bone spur   • Cervical radiculopathy   • Elevated prostate specific antigen (PSA) "   • Hypercholesterolemia   • Essential hypertension   • Impaired fasting glucose   • Osteoarthritis   • Recent skin changes   • Tinnitus   • Heart murmur   • Valvular heart disease, mild to moderate AS   • Angina pectoris (HCC)   • Dyspnea on exertion   • Right bundle branch block   • Sinus arrhythmia   • Abnormal stress test   • History of kidney disease   • Coronary artery disease, 50% mid circumflex lesion and nonobstructive PDA lesion   • Bradycardia, sinus   • Palpitations   • Memory impairment of gradual onset   • Paroxysmal SVT (supraventricular tachycardia) (HCC)   • Cancer of parotid gland (HCC)       ICD-10-CM ICD-9-CM   1. Acute pain of right shoulder  M25.511 719.41   2. Pain of right upper extremity  M79.601 729.5     Orders Placed This Encounter   Procedures   • XR Shoulder 2+ View Right     Standing Status:   Future     Standing Expiration Date:   3/16/2023     Order Specific Question:   Reason for Exam:     Answer:   arm pain   • XR Elbow 3+ View Right     Standing Status:   Future     Standing Expiration Date:   3/16/2023     Order Specific Question:   Reason for Exam:     Answer:   pain       Meds Ordered During Visit:  No orders of the defined types were placed in this encounter.      Right shoulder and elbow x-rays ordered as above.  Ibuprofen discussed with patient to take at least twice daily with food for the next 1 week and then go back to taking this only as needed.  If symptoms persist and no acute fracture is present on x-ray, patient may be a good candidate for physical therapy.  I have recommended that he do home exercises of the right shoulder with the finger crawl up the wall motions in flexion and abduction.  I demonstrated this for patient today and recommended that he do this twice daily as tolerated.    Return in about 2 weeks (around 3/30/2022), or if symptoms worsen or fail to improve, for Please fax pet scan done 3/4/2022 to Dr. Campuzano in [facility].          Referring  Provider (if known): No ref. provider found      This document has been electronically signed by Ewelina Chowdary DO  March 16, 2022 16:19 EDT    Ewelina Chowdary DO, [facility]  [address]  [place], [place] [address] [phone] (office)    Part of this note may be an electronic transcription/translation of spoken language to printed text using the Dragon Dictation System.

## 2022-03-17 ENCOUNTER — HOSPITAL ENCOUNTER (OUTPATIENT)
Dept: GENERAL RADIOLOGY | Facility: HOSPITAL | Age: 87
Discharge: HOME | End: 2022-03-17

## 2022-03-17 DIAGNOSIS — M25.511 ACUTE PAIN OF RIGHT SHOULDER: ICD-10-CM

## 2022-03-17 DIAGNOSIS — M79.601 PAIN OF RIGHT UPPER EXTREMITY: ICD-10-CM

## 2022-03-17 PROCEDURE — 73030 X-RAY EXAM OF SHOULDER: CPT

## 2022-03-17 PROCEDURE — 73080 X-RAY EXAM OF ELBOW: CPT | Performed by: RADIOLOGY

## 2022-03-17 PROCEDURE — 73080 X-RAY EXAM OF ELBOW: CPT

## 2022-03-17 PROCEDURE — 73030 X-RAY EXAM OF SHOULDER: CPT | Performed by: RADIOLOGY

## 2022-05-02 ENCOUNTER — OFFICE VISIT (OUTPATIENT)
Dept: FAMILY MEDICINE CLINIC | Facility: CLINIC | Age: 87
End: 2022-05-02

## 2022-05-02 VITALS
OXYGEN SATURATION: 97 % | SYSTOLIC BLOOD PRESSURE: 137 MMHG | DIASTOLIC BLOOD PRESSURE: 66 MMHG | BODY MASS INDEX: 25.15 KG/M2 | WEIGHT: 169.8 LBS | HEART RATE: 52 BPM | HEIGHT: 69 IN | TEMPERATURE: 98 F

## 2022-05-02 DIAGNOSIS — Z00.00 MEDICARE ANNUAL WELLNESS VISIT, SUBSEQUENT: ICD-10-CM

## 2022-05-02 DIAGNOSIS — R41.3 MEMORY IMPAIRMENT OF GRADUAL ONSET: Chronic | ICD-10-CM

## 2022-05-02 DIAGNOSIS — I10 ESSENTIAL HYPERTENSION: Primary | ICD-10-CM

## 2022-05-02 PROCEDURE — 80053 COMPREHEN METABOLIC PANEL: CPT | Performed by: FAMILY MEDICINE

## 2022-05-02 PROCEDURE — G0439 PPPS, SUBSEQ VISIT: HCPCS | Performed by: FAMILY MEDICINE

## 2022-05-02 PROCEDURE — 1170F FXNL STATUS ASSESSED: CPT | Performed by: FAMILY MEDICINE

## 2022-05-02 PROCEDURE — 85025 COMPLETE CBC W/AUTO DIFF WBC: CPT | Performed by: FAMILY MEDICINE

## 2022-05-02 PROCEDURE — 36415 COLL VENOUS BLD VENIPUNCTURE: CPT | Performed by: FAMILY MEDICINE

## 2022-05-02 PROCEDURE — 1159F MED LIST DOCD IN RCRD: CPT | Performed by: FAMILY MEDICINE

## 2022-05-02 NOTE — PROGRESS NOTES
Venipuncture Blood Specimen Collection  Venipuncture performed in Right arm by Yuli Reardon MA with good hemostasis. Patient tolerated the procedure well without complications.   05/02/22   Yuli Reardon MA

## 2022-05-02 NOTE — PROGRESS NOTES
The ABCs of the Annual Wellness Visit  Subsequent Medicare Wellness Visit    Chief Complaint   Patient presents with   • Medicare Wellness-subsequent   • Hyperlipidemia   • Hypertension      Subjective    History of Present Illness:  Clinton Ramirez is a 87 y.o. male who presents for a Subsequent Medicare Wellness Visit.  Follow-up hypertension dyslipidemia cognitive impairment.  Here with spouse.  Compliant with medications.  Has worked through the right facial cancer.  Had unremarkable PET scan a few months ago.  Planning to relocate probably to [place] soon to be closer to children.  Denies respiratory CDV GI  orthopedic no skin concerns.  Will be following with cardiology.  Available ENT and cardiology records reviewed.  Compliant with medications as reconciled.    The following portions of the patient's history were reviewed and   updated as appropriate: allergies, current medications, past family history, past medical history, past surgical history and problem list.    Compared to one year ago, the patient feels his physical   health is worse.    Compared to one year ago, the patient feels his mental   health is the same.    Recent Hospitalizations:  He was not admitted to the hospital during the last year.       Current Medical Providers:  Patient Care Team:  Nolberto Navarro MD as PCP - General  Nolberto Navarro MD as PCP - Family Medicine  [facility] as Surgeon (Orthopedic Surgery)  Ean Richards MD as Consulting Physician (Urology)  Sandra Torres MD as Consulting Physician (Cardiology)    Outpatient Medications Prior to Visit   Medication Sig Dispense Refill   • aspirin 81 MG chewable tablet Chew 81 mg Every Morning.     • atorvastatin (LIPITOR) 40 MG tablet TAKE 1 TABLET DAILY 90 tablet 3   • donepezil (ARICEPT) 10 MG tablet TAKE 1 TABLET EVERY NIGHT 90 tablet 3   • felodipine (PLENDIL) 2.5 MG 24 hr tablet TAKE 3 TABLETS DAILY (Patient  taking differently: Take 2.5 mg by mouth 2 (Two) Times a Day.) 270 tablet 3   • ibuprofen (ADVIL,MOTRIN) 200 MG tablet Take 200 mg by mouth Every 6 (Six) Hours As Needed for Mild Pain .     • lidocaine (XYLOCAINE) 2 % jelly Apply  topically to the appropriate area as directed As Needed for Mild Pain . 1 each 3   • losartan (COZAAR) 25 MG tablet Take 0.5 tablets by mouth Daily. 45 tablet 2   • metoprolol succinate XL (TOPROL-XL) 25 MG 24 hr tablet TAKE ONE-HALF (1/2) TABLET DAILY 90 tablet 3   • ondansetron (ZOFRAN) 4 MG tablet Take 1 tablet by mouth As Needed.       Facility-Administered Medications Prior to Visit   Medication Dose Route Frequency Provider Last Rate Last Admin   • lidocaine (XYLOCAINE) 2 % jelly   Topical HALIEN Jenny Mccauley, VERONICA           No opioid medication identified on active medication list. I have reviewed chart for other potential  high risk medication/s and harmful drug interactions in the elderly.          Aspirin is on active medication list. Aspirin use is indicated based on review of current medical condition/s. Pros and cons of this therapy have been discussed today. Benefits of this medication outweigh potential harm.  Patient has been encouraged to continue taking this medication.  .      Patient Active Problem List   Diagnosis   • Bone spur   • Elevated prostate specific antigen (PSA)   • Hypercholesterolemia   • Essential hypertension   • Impaired fasting glucose   • Osteoarthritis   • Recent skin changes   • Tinnitus   • Heart murmur   • Valvular heart disease, mild to moderate AS   • Angina pectoris (HCC)   • Dyspnea on exertion   • Right bundle branch block   • Sinus arrhythmia   • Abnormal stress test   • History of kidney disease   • Coronary artery disease, 50% mid circumflex lesion and nonobstructive PDA lesion   • Bradycardia, sinus   • Palpitations   • Memory impairment of gradual onset   • Paroxysmal SVT (supraventricular tachycardia) ([facility])   • Cancer of parotid  "gland (HCC)     Advance Care Planning  Advance Directive is on file.  ACP discussion was held with the patient during this visit. Patient has an advance directive in EMR which is still valid.           Objective    Vitals:    22 1025   BP: 137/66   BP Location: Right arm   Patient Position: Sitting   Cuff Size: Adult   Pulse: 52   Temp: 98 °F (36.7 °C)   TempSrc: Temporal   SpO2: 97%   Weight: 77 kg (169 lb 12.8 oz)   Height: 175.3 cm (69.02\")     BMI Readings from Last 1 Encounters:   22 25.06 kg/m²   BMI is within normal parameters. No follow-up required.    Does the patient have evidence of cognitive impairment? Yes    Physical Exam  Vitals reviewed.   Constitutional:       Appearance: Normal appearance.   HENT:      Head: Normocephalic.   Eyes:      Conjunctiva/sclera: Conjunctivae normal.   Cardiovascular:      Rate and Rhythm: Normal rate and regular rhythm.      Heart sounds: Normal heart sounds.   Pulmonary:      Effort: Pulmonary effort is normal.      Breath sounds: Normal breath sounds.   Musculoskeletal:      Cervical back: Normal range of motion and neck supple.   Skin:     General: Skin is warm and dry.   Neurological:      Mental Status: He is alert and oriented to person, place, and time.   Psychiatric:         Mood and Affect: Mood normal.                 HEALTH RISK ASSESSMENT    Smoking Status:  Social History     Tobacco Use   Smoking Status Former Smoker   • Packs/day: 3.00   • Years: 11.00   • Pack years: 33.00   • Types: Cigarettes   • Quit date:    • Years since quittin.3   Smokeless Tobacco Never Used     Alcohol Consumption:  Social History     Substance and Sexual Activity   Alcohol Use Yes   • Alcohol/week: 1.0 - 2.0 standard drink   • Types: 1 - 2 Cans of beer per week    Comment: 1 beer daily sometimes 2      Fall Risk Screen:    CUBA Fall Risk Assessment was completed, and patient is at HIGH risk for falls. Assessment completed on:2022    Depression " Screening:  PHQ-2/PHQ-9 Depression Screening 5/2/2022   Retired PHQ-9 Total Score -   Retired Total Score -   Little Interest or Pleasure in Doing Things 0-->not at all   Feeling Down, Depressed or Hopeless 0-->not at all   PHQ-9: Brief Depression Severity Measure Score 0       Health Habits and Functional and Cognitive Screening:  Functional & Cognitive Status 5/2/2022   Do you have difficulty preparing food and eating? No   Do you have difficulty bathing yourself, getting dressed or grooming yourself? No   Do you have difficulty using the toilet? No   Do you have difficulty moving around from place to place? No   Do you have trouble with steps or getting out of a bed or a chair? No   Current Diet Well Balanced Diet   Dental Exam Other        Dental Exam Comment dentures   Eye Exam Not up to date   Exercise (times per week) 7 times per week   Current Exercises Include Walking;Yard Work   Current Exercise Activities Include -   Do you need help using the phone?  No   Are you deaf or do you have serious difficulty hearing?  No   Do you need help with transportation? No   Do you need help shopping? No   Do you need help preparing meals?  No   Do you need help with housework?  No   Do you need help with laundry? No   Do you need help taking your medications? No   Do you need help managing money? No   Do you ever drive or ride in a car without wearing a seat belt? -   Have you felt unusual stress, anger or loneliness in the last month? No   Who do you live with? Spouse   If you need help, do you have trouble finding someone available to you? No   Have you been bothered in the last four weeks by sexual problems? No   Do you have difficulty concentrating, remembering or making decisions? No       Age-appropriate Screening Schedule:  Refer to the list below for future screening recommendations based on patient's age, sex and/or medical conditions. Orders for these recommended tests are listed in the plan section. The  patient has been provided with a written plan.    Health Maintenance   Topic Date Due   • ZOSTER VACCINE (2 of 3) 03/13/2014   • TDAP/TD VACCINES (2 - Td or Tdap) 01/01/2015   • INFLUENZA VACCINE  08/01/2022   • LIPID PANEL  12/30/2022              Assessment/Plan   CMS Preventative Services Quick Reference  Risk Factors Identified During Encounter  Cardiovascular Disease  Dementia/Memory   Immunizations Discussed/Encouraged (specific Immunizations; Shingrix and COVID19  Inactivity/Sedentary  The above risks/problems have been discussed with the patient.  Follow up actions/plans if indicated are seen below in the Assessment/Plan Section.  Pertinent information has been shared with the patient in the After Visit Summary.    Diagnoses and all orders for this visit:    1. Essential hypertension (Primary)  -     CBC & Differential  -     Comprehensive Metabolic Panel    2. Memory impairment of gradual onset    3. Medicare annual wellness visit, subsequent    Overall you seem to be stable.  Today will check labs as noted to monitor metabolically.  Stay safely active maintain pandemic response.  Consider the fourth COVID booster in near future as well as shingles vaccine.  We will continue medications as reconciled ordered.  I wholeheartedly agree with you relocating closer to family.  In the interim we will ask a 4-month follow-up in this clinic if you happen to still reside here.    Follow Up:   Return in about 4 months (around 9/2/2022).     An After Visit Summary and PPPS were made available to the patient.

## 2022-05-03 LAB
ALBUMIN SERPL-MCNC: 4.3 G/DL (ref 3.5–5.2)
ALBUMIN/GLOB SERPL: 1.4 G/DL
ALP SERPL-CCNC: 93 U/L (ref 39–117)
ALT SERPL W P-5'-P-CCNC: 18 U/L (ref 1–41)
ANION GAP SERPL CALCULATED.3IONS-SCNC: 12 MMOL/L (ref 5–15)
AST SERPL-CCNC: 26 U/L (ref 1–40)
BASOPHILS # BLD AUTO: 0.04 10*3/MM3 (ref 0–0.2)
BASOPHILS NFR BLD AUTO: 0.6 % (ref 0–1.5)
BILIRUB SERPL-MCNC: 0.4 MG/DL (ref 0–1.2)
BUN SERPL-MCNC: 14 MG/DL (ref 8–23)
BUN/CREAT SERPL: 12.8 (ref 7–25)
CALCIUM SPEC-SCNC: 9.1 MG/DL (ref 8.6–10.5)
CHLORIDE SERPL-SCNC: 104 MMOL/L (ref 98–107)
CO2 SERPL-SCNC: 23 MMOL/L (ref 22–29)
CREAT SERPL-MCNC: 1.09 MG/DL (ref 0.76–1.27)
DEPRECATED RDW RBC AUTO: 43.2 FL (ref 37–54)
EGFRCR SERPLBLD CKD-EPI 2021: 65.7 ML/MIN/1.73
EOSINOPHIL # BLD AUTO: 0.14 10*3/MM3 (ref 0–0.4)
EOSINOPHIL NFR BLD AUTO: 2.2 % (ref 0.3–6.2)
ERYTHROCYTE [DISTWIDTH] IN BLOOD BY AUTOMATED COUNT: 12.6 % (ref 12.3–15.4)
GLOBULIN UR ELPH-MCNC: 3 GM/DL
GLUCOSE SERPL-MCNC: 91 MG/DL (ref 65–99)
HCT VFR BLD AUTO: 43.7 % (ref 37.5–51)
HGB BLD-MCNC: 14.9 G/DL (ref 13–17.7)
IMM GRANULOCYTES # BLD AUTO: 0.01 10*3/MM3 (ref 0–0.05)
IMM GRANULOCYTES NFR BLD AUTO: 0.2 % (ref 0–0.5)
LYMPHOCYTES # BLD AUTO: 1.3 10*3/MM3 (ref 0.7–3.1)
LYMPHOCYTES NFR BLD AUTO: 20.1 % (ref 19.6–45.3)
MCH RBC QN AUTO: 31.6 PG (ref 26.6–33)
MCHC RBC AUTO-ENTMCNC: 34.1 G/DL (ref 31.5–35.7)
MCV RBC AUTO: 92.8 FL (ref 79–97)
MONOCYTES # BLD AUTO: 0.67 10*3/MM3 (ref 0.1–0.9)
MONOCYTES NFR BLD AUTO: 10.4 % (ref 5–12)
NEUTROPHILS NFR BLD AUTO: 4.31 10*3/MM3 (ref 1.7–7)
NEUTROPHILS NFR BLD AUTO: 66.5 % (ref 42.7–76)
NRBC BLD AUTO-RTO: 0 /100 WBC (ref 0–0.2)
PLATELET # BLD AUTO: 224 10*3/MM3 (ref 140–450)
PMV BLD AUTO: 12.4 FL (ref 6–12)
POTASSIUM SERPL-SCNC: 4.2 MMOL/L (ref 3.5–5.2)
PROT SERPL-MCNC: 7.3 G/DL (ref 6–8.5)
RBC # BLD AUTO: 4.71 10*6/MM3 (ref 4.14–5.8)
SODIUM SERPL-SCNC: 139 MMOL/L (ref 136–145)
WBC NRBC COR # BLD: 6.47 10*3/MM3 (ref 3.4–10.8)

## 2022-05-13 ENCOUNTER — OFFICE VISIT (OUTPATIENT)
Dept: CARDIOLOGY | Facility: CLINIC | Age: 87
End: 2022-05-13

## 2022-05-13 VITALS
HEART RATE: 50 BPM | SYSTOLIC BLOOD PRESSURE: 130 MMHG | DIASTOLIC BLOOD PRESSURE: 58 MMHG | WEIGHT: 172 LBS | BODY MASS INDEX: 25.48 KG/M2 | OXYGEN SATURATION: 97 % | HEIGHT: 69 IN

## 2022-05-13 DIAGNOSIS — R00.2 PALPITATIONS: Primary | ICD-10-CM

## 2022-05-13 DIAGNOSIS — I47.1 PAROXYSMAL SVT (SUPRAVENTRICULAR TACHYCARDIA): ICD-10-CM

## 2022-05-13 DIAGNOSIS — I25.10 CORONARY ARTERY DISEASE INVOLVING NATIVE CORONARY ARTERY OF NATIVE HEART WITHOUT ANGINA PECTORIS: Primary | ICD-10-CM

## 2022-05-13 PROCEDURE — 99214 OFFICE O/P EST MOD 30 MIN: CPT | Performed by: STUDENT IN AN ORGANIZED HEALTH CARE EDUCATION/TRAINING PROGRAM

## 2022-05-14 PROCEDURE — 93000 ELECTROCARDIOGRAM COMPLETE: CPT | Performed by: STUDENT IN AN ORGANIZED HEALTH CARE EDUCATION/TRAINING PROGRAM

## 2022-05-14 NOTE — PROGRESS NOTES
Electrophysiology Clinic Consult     Clinton Ramirez  1935  [unfilled]  [unfilled]    05/14/22    DATE OF ADMISSION: (Not on file)  [facility] CARDIOLOGY    Nolberto Navarro MD  [address] / [facility] [address]  Referring Provider: No ref. provider found     Chief Complaint   Patient presents with   • Paroxysmal SVT (supraventricular tachycardia)     Referring provider: Dr. Torres    Problem list    1. SVT  a. 21-hour Holter monitor, 10/16/2018, Avg Hr 65, Min HR 47, Max .  Predominantly normal sinus rhythm.  1 3 beat run SVT at 143 bpm.  b. Holter monitor, 24-hour, 2/25/2020, Avg HR 65, Min HR 43, Max Hr 107.  Normal sinus rhythm with RBBB.  PACs occasionally.  No A. fib.  No pauses.  c. 14 day Holter monitor, 9/24/2021, Avg HR 67, Min HR 40, Max  bpm.  Predominantly sinus rhythm. 0% AF. 0.12% PVC burden.  246 events of SVT, longest 53 seconds, fastest 159 bpm.  d. Initiated on Toprol-XL 12.5 mg   2. CAD  a. Echo, 5/11/2018, EF 61 - 65%, mild AR.  Mild to mod AS.  Mild MR.  b. Stress test, 6/28/2018, EF over 70%.  No ischemia  c. C, 7/11/2018, single-vessel coronary artery disease with 50% stenosis of the mid circumflex, EF 65%.  Mild aortic stenosis.  No PCI  d. Echo, 2/28/2020, EF 56-60%, mild calcification of aortic valve with mild aortic valve stenosis.  Mild MAC.  Bileaflet mitral valve thickening present.  Mild TR.  Moderate pulmonary hypertension.  LA mildly dilated.  e. Bilateral carotid ultrasound, 5/27/2021,  No acute findings in the arteries of the neck.  3. Hypertension  4. Hyperlipidemia  5. Valvular heart disease  a. Mild to moderate AAS  6. Arthritis  7. Multiple benign polyps of large intestine  8. Pancreatitis  9. 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 Negative chest x-ray  11. Mild renal insufficiency  12. History of rheumatic fever  a.  at age 20 while in  hospital  13. Skin cancer  a. Basal cell from ears, melanoma  from neck  b. Currently follow with dr. Dupree undergoing radiation.   14. Mild Dementia       History of Present Illness:     Mr. Ramirez is a pleasant 86 year old WM with above noted PMH. He presents today for follow-up of bradycardia and short runs of SVT.  He was last seen in clinic approximately 6 months ago as a new patient.  Since then he has overall done about the same.  He really is not feeling palpitations at the current time.  He continues to have a slow heart rate, which he says he has had for as long as he can remember.  He does have a little bit of fatigue, but is unsure if this is related to bradycardia versus is at his age.  He still was able to do most of the activities that he wants on a daily basis.  He is quite active working out side.  He denies any chest pain, dyspnea, orthopnea, PND, or lower extremity swelling.    No Known Allergies     Cannot display prior to admission medications because the patient has not been admitted in this contact.            Current Outpatient Medications:   •  aspirin 81 MG chewable tablet, Chew 81 mg Every Morning., Disp: , Rfl:   •  atorvastatin (LIPITOR) 40 MG tablet, TAKE 1 TABLET DAILY, Disp: 90 tablet, Rfl: 3  •  donepezil (ARICEPT) 10 MG tablet, TAKE 1 TABLET EVERY NIGHT, Disp: 90 tablet, Rfl: 3  •  felodipine (PLENDIL) 2.5 MG 24 hr tablet, TAKE 3 TABLETS DAILY (Patient taking differently: Take 2.5 mg by mouth 2 (Two) Times a Day.), Disp: 270 tablet, Rfl: 3  •  ibuprofen (ADVIL,MOTRIN) 200 MG tablet, Take 200 mg by mouth Every 6 (Six) Hours As Needed for Mild Pain ., Disp: , Rfl:   •  lidocaine (XYLOCAINE) 2 % jelly, Apply  topically to the appropriate area as directed As Needed for Mild Pain ., Disp: 1 each, Rfl: 3  •  losartan (COZAAR) 25 MG tablet, Take 0.5 tablets by mouth Daily., Disp: 45 tablet, Rfl: 2  •  ondansetron (ZOFRAN) 4 MG tablet, Take 1 tablet by mouth As Needed., Disp: , Rfl:     Current Facility-Administered Medications:   •  lidocaine  "(XYLOCAINE) 2 % jelly, , Topical, PRN, Garrick, Jenny Jeann, APRN    Social History     Socioeconomic History   • Marital status:    Tobacco Use   • Smoking status: Former Smoker     Packs/day: 3.00     Years: 11.00     Pack years: 33.00     Types: Cigarettes     Quit date:      Years since quittin.3   • Smokeless tobacco: Never Used   Vaping Use   • Vaping Use: Never used   Substance and Sexual Activity   • Alcohol use: Yes     Alcohol/week: 1.0 - 2.0 standard drink     Types: 1 - 2 Cans of beer per week     Comment: 1 beer daily sometimes 2    • Drug use: No   • Sexual activity: Defer     Partners: Female     Birth control/protection: None       Family History   Problem Relation Age of Onset   • Pancreatic cancer Sister    • Tuberculosis Other    • Diabetes Other    • Heart disease Father    • Lung cancer Brother    • Leukemia Sister          Vitals:    22 1437   BP: 130/58   BP Location: Right arm   Patient Position: Sitting   Pulse: 50   SpO2: 97%   Weight: 78 kg (172 lb)   Height: 175.3 cm (69\")                 Physical Exam:  GEN: Well nourished, well-developed, no acute distress  HEENT: Normocephalic, atraumatic, PERRLA, moist mucous membranes  NECK: Supple, NO JVD, no thyromegaly, no lymphadenopathy  CARD: S1S2, bradycardic, regular rhythm, no murmur, gallop, rub  LUNGS: Clear to auscultation, normal respiratory effort  ABDOMEN: Soft, nontender, normal bowel sounds  EXTREMITIES: No gross deformities, no clubbing, cyanosis, or edema  SKIN: Warm, dry  NEURO: No focal deficits, alert and oriented x 3  PSYCHIATRIC: Normal affect and mood      I personally viewed and interpreted the patient's EKG/Telemetry/lab data    Lab Results   Component Value Date    GLUCOSE 91 2022    CALCIUM 9.1 2022     2022    K 4.2 2022    CO2 23.0 2022     2022    BUN 14 2022    CREATININE 1.09 2022    EGFRIFNONA 93 2021    BCR 12.8 2022    " ANIONGAP 12.0 05/02/2022     Lab Results   Component Value Date    WBC 6.47 05/02/2022    HGB 14.9 05/02/2022    HCT 43.7 05/02/2022    MCV 92.8 05/02/2022     05/02/2022     No results found for: INR, PROTIME  Lab Results   Component Value Date    TSH 4.300 (H) 04/21/2021             ECG 12 Lead    Date/Time: 5/14/2022 2:18 PM  Performed by: Chon Henson MD  Authorized by: Chon Henson MD   Comparison: compared with previous ECG from 2/2/2022  Similar to previous ECG  Comments: Sinus bradycardia, right bundle branch block            No diagnosis found.    Assessment and Plan:     1. SVT   -Noted on recent 14 day holter monitor, 9/24/21, 246 episodes of SVT, longest 53s and fastest 149 bpm. Reinitiated on Toprol XL 12.5 mg daily. Patient asymptomatic with episodes of SVT, overall episodes very brief.   -Given his relative bradycardia and fatigue, we will go ahead and stop his metoprolol.  He is not overall bothered by these short episodes of SVT so do not think he needs any further management at the current time.    2. Bradycardia  -He continues to have sinus bradycardia.  It is uncertain exactly how symptomatic he has with this.  We discussed that we could potentially proceed with pacemaker implantation to see if this would help improve his energy.  I cannot guarantee that this would improve any of his symptoms but there is a good chance that it would.  In order to try to get more information on the degree of his bradycardia and whether or not he has chronotropic competence we will proceed with another ambulatory monitor to reassess his heart rate.    3.CAD  -Continue medications, per Dr. Torres  - Echo, 2/28/2020, EF 56-60%, mild calcification of aortic valve with mild aortic valve stenosis.  Mild MAC.  Bileaflet mitral valve thickening present.  Mild TR.  Moderate pulmonary hypertension.  LA mildly dilated.    4. HTN  -Well controlled, continue medications    5. HLD  -Continue Statin

## 2022-05-23 RX ORDER — ATORVASTATIN CALCIUM 40 MG/1
TABLET, FILM COATED ORAL
Qty: 90 TABLET | Refills: 3 | Status: SHIPPED | OUTPATIENT
Start: 2022-05-23 | End: 2023-03-30 | Stop reason: SDUPTHER

## 2022-08-17 ENCOUNTER — OFFICE VISIT (OUTPATIENT)
Dept: CARDIOLOGY | Facility: CLINIC | Age: 87
End: 2022-08-17

## 2022-08-17 VITALS
DIASTOLIC BLOOD PRESSURE: 58 MMHG | WEIGHT: 171 LBS | HEART RATE: 68 BPM | TEMPERATURE: 97.3 F | BODY MASS INDEX: 25.33 KG/M2 | SYSTOLIC BLOOD PRESSURE: 132 MMHG | OXYGEN SATURATION: 97 % | HEIGHT: 69 IN

## 2022-08-17 DIAGNOSIS — I47.1 PAROXYSMAL SVT (SUPRAVENTRICULAR TACHYCARDIA): ICD-10-CM

## 2022-08-17 DIAGNOSIS — E78.00 HYPERCHOLESTEROLEMIA: Chronic | ICD-10-CM

## 2022-08-17 DIAGNOSIS — I38 VALVULAR HEART DISEASE: Primary | Chronic | ICD-10-CM

## 2022-08-17 DIAGNOSIS — I10 ESSENTIAL HYPERTENSION: Chronic | ICD-10-CM

## 2022-08-17 DIAGNOSIS — I25.10 CORONARY ARTERY DISEASE INVOLVING NATIVE CORONARY ARTERY OF NATIVE HEART WITHOUT ANGINA PECTORIS: Chronic | ICD-10-CM

## 2022-08-17 PROBLEM — R01.1 HEART MURMUR: Status: RESOLVED | Noted: 2018-05-29 | Resolved: 2022-08-17

## 2022-08-17 PROCEDURE — 99214 OFFICE O/P EST MOD 30 MIN: CPT | Performed by: INTERNAL MEDICINE

## 2022-08-17 NOTE — PROGRESS NOTES
subjective     Chief Complaint   Patient presents with   • Coronary Artery Disease     Follow up   • Slow Heart Rate     Follow up      History of Present Illness  Patient is 87 years old gentleman who has history of mild to moderate aortic stenosis and was seen by me because of multiple runs of atrial tachycardia.  He was seen by Dr. Henson recently and had another 3-day Holter monitor.  Because of bradycardia metoprolol ER 12.5 mg daily was discontinued.    Patient is doing very well however he is not sure he is taking metoprolol or if he stopped it.  Patient is quite elderly and might have misunderstood it.  He was advised to call me back to see if he is taking metoprolol.  He was instructed to discontinue.    He denies any chest pain palpitations or shortness of breath at this time.    Blood pressure is controlled with Plendil and losartan.    Hyperlipidemia on Lipitor therapy.      Past Surgical History:   Procedure Laterality Date   • BIOPSY PROSTATE NEEDLE / PUNCH / INCISIONAL     • CARDIAC CATHETERIZATION N/A 7/11/2018    Procedure: Left Heart Cath;  Surgeon: Claire Scott MD;  Location: [facility] CATH INVASIVE LOCATION;  Service: Cardiovascular   • COLONOSCOPY     • EAR BIOPSY      basal cell skin cancer   • SKIN CANCER EXCISION      melanoma from neck    • THROAT SURGERY      Biopsy     Family History   Problem Relation Age of Onset   • Pancreatic cancer Sister    • Tuberculosis Other    • Diabetes Other    • Heart disease Father    • Lung cancer Brother    • Leukemia Sister      Past Medical History:   Diagnosis Date   • Arthritis     hands   • Cervical radiculopathy 5/11/2016   • Elevated cholesterol    • Hypertension    • Hypokalemia    • Multiple benign polyps of large intestine    • Need for prophylactic vaccination and inoculation against influenza    • Pancreatitis    • Paralytic ileus (HCC)    • PPD positive     negative chest xray    • Renal insufficiency     mild-told to stop Naproxen   • Rheumatic  fever     age 20 while in -hospitalized    • Skin cancer     basal cell from ears, melanoma from neck area    • Valvular disease    • Wears glasses    • Wears partial dentures     upper      Patient Active Problem List   Diagnosis   • Bone spur   • Elevated prostate specific antigen (PSA)   • Hypercholesterolemia   • Essential hypertension   • Impaired fasting glucose   • Osteoarthritis   • Recent skin changes   • Tinnitus   • Valvular heart disease, mild to moderate AS   • Angina pectoris (HCC)   • Dyspnea on exertion   • Right bundle branch block   • Sinus arrhythmia   • Abnormal stress test   • History of kidney disease   • Coronary artery disease, 50% mid circumflex lesion and nonobstructive PDA lesion   • Bradycardia, sinus   • Palpitations   • Memory impairment of gradual onset   • Paroxysmal SVT (supraventricular tachycardia) (HCC)   • Cancer of parotid gland (HCC)       Social History     Tobacco Use   • Smoking status: Former Smoker     Packs/day: 3.00     Years: 11.00     Pack years: 33.00     Types: Cigarettes     Quit date:      Years since quittin.6   • Smokeless tobacco: Never Used   Vaping Use   • Vaping Use: Never used   Substance Use Topics   • Alcohol use: Yes     Alcohol/week: 1.0 - 2.0 standard drink     Types: 1 - 2 Cans of beer per week     Comment: 1 beer daily sometimes 2    • Drug use: No       No Known Allergies    Current Outpatient Medications on File Prior to Visit   Medication Sig   • aspirin 81 MG chewable tablet Chew 81 mg Every Morning.   • atorvastatin (LIPITOR) 40 MG tablet TAKE 1 TABLET DAILY   • donepezil (ARICEPT) 10 MG tablet TAKE 1 TABLET EVERY NIGHT   • felodipine (PLENDIL) 2.5 MG 24 hr tablet TAKE 3 TABLETS DAILY (Patient taking differently: Take 2.5 mg by mouth 2 (Two) Times a Day.)   • ibuprofen (ADVIL,MOTRIN) 200 MG tablet Take 200 mg by mouth Every 6 (Six) Hours As Needed for Mild Pain .   • losartan (COZAAR) 25 MG tablet Take 0.5 tablets by mouth Daily.  "  • ondansetron (ZOFRAN) 4 MG tablet Take 1 tablet by mouth As Needed.   • [DISCONTINUED] lidocaine (XYLOCAINE) 2 % jelly Apply  topically to the appropriate area as directed As Needed for Mild Pain .     Current Facility-Administered Medications on File Prior to Visit   Medication   • lidocaine (XYLOCAINE) 2 % jelly         The following portions of the patient's history were reviewed and updated as appropriate: allergies, current medications, past family history, past medical history, past social history, past surgical history and problem list.    Review of Systems   Constitutional: Negative.   HENT: Negative.  Negative for congestion.    Eyes: Negative.    Cardiovascular: Negative.  Negative for chest pain, cyanosis, dyspnea on exertion, irregular heartbeat, leg swelling, near-syncope, orthopnea, palpitations, paroxysmal nocturnal dyspnea and syncope.   Respiratory: Negative.  Negative for shortness of breath.    Hematologic/Lymphatic: Negative.    Musculoskeletal: Negative.    Gastrointestinal: Negative.    Neurological: Negative.  Negative for headaches.          Objective:     /58 (BP Location: Left arm, Patient Position: Sitting, Cuff Size: Adult)   Pulse 68   Temp 97.3 °F (36.3 °C)   Ht 175.3 cm (69\")   Wt 77.6 kg (171 lb)   SpO2 97%   BMI 25.25 kg/m²   Pulmonary:      Effort: Pulmonary effort is normal.      Breath sounds: Normal breath sounds. No stridor. No wheezing. No rhonchi. No rales.   Cardiovascular:      PMI at left midclavicular line. Normal rate. Regular rhythm. Normal S1. Normal S2.      Murmurs: There is a harsh midsystolic murmur at the URSB, radiating to the neck.      No gallop. No click. No rub.   Pulses:     Intact distal pulses.   Edema:     Peripheral edema absent.           Lab Review  Lab Results   Component Value Date     05/02/2022    K 4.2 05/02/2022     05/02/2022    BUN 14 05/02/2022    CREATININE 1.09 05/02/2022    GLUCOSE 91 05/02/2022    CALCIUM 9.1 " 05/02/2022    ALT 18 05/02/2022    ALKPHOS 93 05/02/2022    LABIL2 1.3 (L) 05/12/2016     Lab Results   Component Value Date    CKTOTAL 43 03/07/2019     Lab Results   Component Value Date    WBC 6.47 05/02/2022    HGB 14.9 05/02/2022    HCT 43.7 05/02/2022     05/02/2022     No results found for: INR  No results found for: MG  Lab Results   Component Value Date    TSH 4.300 (H) 04/21/2021     No results found for: BNP  Lab Results   Component Value Date    CHLPL 166 05/12/2016    CHOL 125 12/30/2021    TRIG 155 (H) 12/30/2021    HDL 44 12/30/2021    VLDL 26 12/30/2021    LDLHDL 1.14 12/30/2021     Lab Results   Component Value Date    LDL 55 12/30/2021    LDL 67 04/21/2021       Procedures       I personally viewed and interpreted the patient's LAB data         Assessment:     1. Valvular heart disease, mild to moderate AS    2. Paroxysmal SVT (supraventricular tachycardia) (HCC)    3. Hypercholesterolemia    4. Essential hypertension    5. Coronary artery disease involving native coronary artery of native heart without angina pectoris          Plan:     Patient is 87 years old white male with history of atrial tachycardia and sick sinus syndrome with periods of sinus bradycardia.  He was taking metoprolol ER 12.5 mg daily which was discontinued by Dr. Henson last visit.  Patient is doing very well and is asymptomatic.  He was advised to make sure that he is not taking metoprolol ER 12.5 daily.  Blood pressure is very well controlled he will continue losartan and Plendil.  Hyperlipidemia last LDL 55 he will continue Lipitor.  Overall patient is doing very well  Follow-up in 6 months and will check echocardiogram for aortic valve evaluation sometimes in the future next year        No follow-ups on file.

## 2022-08-26 DIAGNOSIS — I10 ESSENTIAL HYPERTENSION: ICD-10-CM

## 2022-08-26 RX ORDER — LOSARTAN POTASSIUM 25 MG/1
12.5 TABLET ORAL DAILY
Qty: 45 TABLET | Refills: 2 | Status: SHIPPED | OUTPATIENT
Start: 2022-08-26 | End: 2023-03-30 | Stop reason: SDUPTHER

## 2022-08-26 NOTE — TELEPHONE ENCOUNTER
Caller: Clinton Ramirez    Relationship: Self    Best call back number: [phone]    Requested Prescriptions:   Requested Prescriptions     Pending Prescriptions Disp Refills   • losartan (COZAAR) 25 MG tablet 45 tablet 2     Sig: Take 0.5 tablets by mouth Daily.        Pharmacy where request should be sent: [facility] - [address] [phone] [facility] [phone] [facility]     Additional details provided by patient: PATIENT NEEDS REFILL    Does the patient have less than a 3 day supply:  [x] Yes  [] No    Bryce Rucker Rep   08/26/22 13:31 EDT

## 2022-08-31 ENCOUNTER — OFFICE VISIT (OUTPATIENT)
Dept: FAMILY MEDICINE CLINIC | Facility: CLINIC | Age: 87
End: 2022-08-31

## 2022-08-31 VITALS
HEART RATE: 70 BPM | OXYGEN SATURATION: 96 % | SYSTOLIC BLOOD PRESSURE: 142 MMHG | DIASTOLIC BLOOD PRESSURE: 72 MMHG | WEIGHT: 170.2 LBS | TEMPERATURE: 98 F | HEIGHT: 69 IN | BODY MASS INDEX: 25.21 KG/M2

## 2022-08-31 DIAGNOSIS — I73.9 PAD (PERIPHERAL ARTERY DISEASE): ICD-10-CM

## 2022-08-31 DIAGNOSIS — C07 CANCER OF PAROTID GLAND: Chronic | ICD-10-CM

## 2022-08-31 DIAGNOSIS — I10 ESSENTIAL HYPERTENSION: Primary | Chronic | ICD-10-CM

## 2022-08-31 DIAGNOSIS — E78.00 HYPERCHOLESTEROLEMIA: Chronic | ICD-10-CM

## 2022-08-31 DIAGNOSIS — R73.01 IMPAIRED FASTING GLUCOSE: ICD-10-CM

## 2022-08-31 PROBLEM — I20.9 ANGINA PECTORIS: Status: RESOLVED | Noted: 2018-05-29 | Resolved: 2022-08-31

## 2022-08-31 LAB
ALBUMIN SERPL-MCNC: 4.5 G/DL (ref 3.5–5.2)
ALBUMIN/GLOB SERPL: 1.3 G/DL
ALP SERPL-CCNC: 92 U/L (ref 39–117)
ALT SERPL W P-5'-P-CCNC: 26 U/L (ref 1–41)
ANION GAP SERPL CALCULATED.3IONS-SCNC: 12.4 MMOL/L (ref 5–15)
AST SERPL-CCNC: 35 U/L (ref 1–40)
BILIRUB SERPL-MCNC: 0.7 MG/DL (ref 0–1.2)
BUN SERPL-MCNC: 16 MG/DL (ref 8–23)
BUN/CREAT SERPL: 13.6 (ref 7–25)
CALCIUM SPEC-SCNC: 9.6 MG/DL (ref 8.6–10.5)
CHLORIDE SERPL-SCNC: 102 MMOL/L (ref 98–107)
CHOLEST SERPL-MCNC: 141 MG/DL (ref 0–200)
CO2 SERPL-SCNC: 24.6 MMOL/L (ref 22–29)
CREAT SERPL-MCNC: 1.18 MG/DL (ref 0.76–1.27)
DEPRECATED RDW RBC AUTO: 40.3 FL (ref 37–54)
EGFRCR SERPLBLD CKD-EPI 2021: 59.7 ML/MIN/1.73
ERYTHROCYTE [DISTWIDTH] IN BLOOD BY AUTOMATED COUNT: 11.9 % (ref 12.3–15.4)
GLOBULIN UR ELPH-MCNC: 3.5 GM/DL
GLUCOSE SERPL-MCNC: 114 MG/DL (ref 65–99)
HBA1C MFR BLD: 5.6 % (ref 4.8–5.6)
HCT VFR BLD AUTO: 44.4 % (ref 37.5–51)
HDLC SERPL-MCNC: 54 MG/DL (ref 40–60)
HGB BLD-MCNC: 15.1 G/DL (ref 13–17.7)
LDLC SERPL CALC-MCNC: 64 MG/DL (ref 0–100)
LDLC/HDLC SERPL: 1.13 {RATIO}
MCH RBC QN AUTO: 31.6 PG (ref 26.6–33)
MCHC RBC AUTO-ENTMCNC: 34 G/DL (ref 31.5–35.7)
MCV RBC AUTO: 92.9 FL (ref 79–97)
PLATELET # BLD AUTO: 249 10*3/MM3 (ref 140–450)
PMV BLD AUTO: 12 FL (ref 6–12)
POTASSIUM SERPL-SCNC: 4.1 MMOL/L (ref 3.5–5.2)
PROT SERPL-MCNC: 8 G/DL (ref 6–8.5)
RBC # BLD AUTO: 4.78 10*6/MM3 (ref 4.14–5.8)
SODIUM SERPL-SCNC: 139 MMOL/L (ref 136–145)
T4 FREE SERPL-MCNC: 1.27 NG/DL (ref 0.93–1.7)
TRIGL SERPL-MCNC: 130 MG/DL (ref 0–150)
TSH SERPL DL<=0.05 MIU/L-ACNC: 5.68 UIU/ML (ref 0.27–4.2)
VLDLC SERPL-MCNC: 23 MG/DL (ref 5–40)
WBC NRBC COR # BLD: 9.88 10*3/MM3 (ref 3.4–10.8)

## 2022-08-31 PROCEDURE — 85027 COMPLETE CBC AUTOMATED: CPT | Performed by: INTERNAL MEDICINE

## 2022-08-31 PROCEDURE — 84439 ASSAY OF FREE THYROXINE: CPT | Performed by: INTERNAL MEDICINE

## 2022-08-31 PROCEDURE — 80061 LIPID PANEL: CPT | Performed by: INTERNAL MEDICINE

## 2022-08-31 PROCEDURE — 80053 COMPREHEN METABOLIC PANEL: CPT | Performed by: INTERNAL MEDICINE

## 2022-08-31 PROCEDURE — 36415 COLL VENOUS BLD VENIPUNCTURE: CPT | Performed by: INTERNAL MEDICINE

## 2022-08-31 PROCEDURE — 84443 ASSAY THYROID STIM HORMONE: CPT | Performed by: INTERNAL MEDICINE

## 2022-08-31 PROCEDURE — 83036 HEMOGLOBIN GLYCOSYLATED A1C: CPT | Performed by: INTERNAL MEDICINE

## 2022-08-31 PROCEDURE — 99214 OFFICE O/P EST MOD 30 MIN: CPT | Performed by: INTERNAL MEDICINE

## 2022-08-31 NOTE — PROGRESS NOTES
Patient Name: Clinton Ramirez Today's Date: 2022   Patient MRN / CSN: [id number] / [id number] Date of Encounter: 2022   Patient Age / : 87 y.o. / 1935 Encounter Provider: Ewelina Chowdary DO   Referring Physician: No ref. provider found          Clinton is a 87 y.o. male who is being seen today for Follow-up, Hypertension, Hyperlipidemia, and transition of care      Mr. Ramirez presents for follow up and to transfer care from Dr. Navarro to myself. 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 Residual tumor was present and he completed radiation treatments as recommended. He has not seen oncology in a while. His last pet scan was done in the spring. He reports feeling well.     Hypertension  This is a chronic problem. The current episode started more than 1 year ago. The problem is controlled. Pertinent negatives include no chest pain or shortness of breath. Current antihypertension treatment includes angiotensin blockers. The current treatment provides significant improvement. There are no compliance problems.    Hyperlipidemia  This is a chronic problem. The current episode started more than 1 year ago. The problem is controlled. Pertinent negatives include no chest pain or shortness of breath. Current antihyperlipidemic treatment includes statins. The current treatment provides significant improvement of lipids. There are no compliance problems.        Allergies include:Patient has no known allergies.  Current Outpatient Medications   Medication Sig Dispense Refill   • aspirin 81 MG chewable tablet Chew 81 mg Every Morning.     • atorvastatin (LIPITOR) 40 MG tablet TAKE 1 TABLET DAILY 90 tablet 3   • donepezil (ARICEPT) 10 MG tablet TAKE 1 TABLET EVERY NIGHT 90 tablet 3   • felodipine (PLENDIL) 2.5 MG 24 hr tablet TAKE 3 TABLETS DAILY (Patient taking differently: Take 2.5 mg by mouth 2 (Two) Times a Day.) 270 tablet 3   • ibuprofen  (ADVIL,MOTRIN) 200 MG tablet Take 200 mg by mouth Every 6 (Six) Hours As Needed for Mild Pain .     • losartan (COZAAR) 25 MG tablet Take 0.5 tablets by mouth Daily. 45 tablet 2     Current Facility-Administered Medications   Medication Dose Route Frequency Provider Last Rate Last Admin   • lidocaine (XYLOCAINE) 2 % jelly   Topical Jenny Mcfarlane APRN         Past Medical History:   Diagnosis Date   • Arthritis     hands   • Cervical radiculopathy 2016   • Elevated cholesterol    • Hypertension    • Hypokalemia    • Multiple benign polyps of large intestine    • Need for prophylactic vaccination and inoculation against influenza    • Pancreatitis    • Paralytic ileus (HCC)    • PPD positive     negative chest xray    • Renal insufficiency     mild-told to stop Naproxen   • Rheumatic fever     age 20 while in -hospitalized    • Skin cancer     basal cell from ears, melanoma from neck area    • Valvular disease    • Wears glasses    • Wears partial dentures     upper      Family History   Problem Relation Age of Onset   • Pancreatic cancer Sister    • Tuberculosis Other    • Diabetes Other    • Heart disease Father    • Lung cancer Brother    • Leukemia Sister      Past Surgical History:   Procedure Laterality Date   • BIOPSY PROSTATE NEEDLE / PUNCH / INCISIONAL     • CARDIAC CATHETERIZATION N/A 2018    Procedure: Left Heart Cath;  Surgeon: Claire Scott MD;  Location: [facility] CATH INVASIVE LOC[facility];  Service: Cardiovascular   • COLONOSCOPY     • EAR BIOPSY      basal cell skin cancer   • SKIN CANCER EXCISION      melanoma from neck    • THROAT SURGERY      Biopsy     Social History     Substance and Sexual Activity   Alcohol Use Not Currently    Comment: up to 3 beers per day, not everyday     Social History     Tobacco Use   Smoking Status Former Smoker   • Packs/day: 3.00   • Years: 11.00   • Pack years: 33.00   • Types: Cigarettes   • Quit date:    • Years since quittin.6  "  Smokeless Tobacco Never Used     Social History     Substance and Sexual Activity   Drug Use No     Review of Systems   Constitutional: Negative for fever.   Respiratory: Negative for shortness of breath.    Cardiovascular: Negative for chest pain and leg swelling.        Patient has PAD and denies claudication with his current regimen.    Gastrointestinal: Negative for abdominal pain.        Depression Assessment Review:  PHQ-9 Total Score:    Vital Signs & Measurements Taken This Encounter  /72 (BP Location: Left arm, Patient Position: Sitting, Cuff Size: Adult)   Pulse 70   Temp 98 °F (36.7 °C) (Temporal)   Ht 175.3 cm (69.02\")   Wt 77.2 kg (170 lb 3.2 oz)   SpO2 96%   BMI 25.12 kg/m²    SpO2 Percentage    08/31/22 0917   SpO2: 96%            Physical Exam  Vitals reviewed.   Constitutional:       General: He is not in acute distress.  HENT:      Head: Normocephalic and atraumatic.   Eyes:      General: No scleral icterus.     Extraocular Movements: Extraocular movements intact.      Conjunctiva/sclera: Conjunctivae normal.      Pupils: Pupils are equal, round, and reactive to light.   Cardiovascular:      Rate and Rhythm: Normal rate and regular rhythm.      Heart sounds: Murmur heard.      Comments: +2 systolic murmur  Pulmonary:      Effort: Pulmonary effort is normal. No respiratory distress.      Breath sounds: Normal breath sounds.   Musculoskeletal:         General: No swelling.      Cervical back: Neck supple. No tenderness.   Lymphadenopathy:      Cervical: No cervical adenopathy.   Skin:     General: Skin is warm and dry.      Coloration: Skin is not jaundiced.   Neurological:      Mental Status: He is alert.   Psychiatric:         Mood and Affect: Mood normal.         Behavior: Behavior normal.            Assessment & Plan  Patient Active Problem List   Diagnosis   • Bone spur   • Elevated prostate specific antigen (PSA)   • Hypercholesterolemia   • Essential hypertension   • Impaired " fasting glucose   • Osteoarthritis   • Recent skin changes   • Tinnitus   • Valvular heart disease, mild to moderate aortic stenosis February 2020   • Dyspnea on exertion   • Right bundle branch block   • Sinus arrhythmia   • Abnormal stress test   • History of kidney disease   • Coronary artery disease, 50% mid circumflex lesion and nonobstructive PDA lesion   • Bradycardia, sinus   • Palpitations   • Memory impairment of gradual onset   • Paroxysmal SVT (supraventricular tachycardia) (HCC)   • Cancer of parotid gland (HCC)       ICD-10-CM ICD-9-CM   1. Essential hypertension  I10 401.9   2. Hypercholesterolemia  E78.00 272.0   3. Impaired fasting glucose  R73.01 790.21   4. Cancer of parotid gland (HCC)  C07 142.0   5. PAD (peripheral artery disease) (HCC)  I73.9 443.9     Orders Placed This Encounter   Procedures   • CBC (No Diff)     Order Specific Question:   Release to patient     Answer:   Routine Release   • Comprehensive Metabolic Panel     Order Specific Question:   Release to patient     Answer:   Routine Release   • Lipid Panel   • TSH     Order Specific Question:   Release to patient     Answer:   Routine Release   • T4, Free   • Hemoglobin A1c     Order Specific Question:   Release to patient     Answer:   Routine Release       Meds Ordered During Visit:  No orders of the defined types were placed in this encounter.      I reviewed cardiology, ENT, dermatology and rad oncology notes today. I also reviewed Dr. Navarro' recent notes. I reviewed last pet scan as well. I recommended he follow up with his rad oncologist soon, as it appears he missed the appointment when he was supposed to follow up in spring. We will continue current medicines. We will update labs today as above.       Return in about 3 months (around 11/30/2022), or if symptoms worsen or fail to improve, for Recheck.          Referring Provider (if known): No ref. provider found      This document has been electronically signed by Ewelina  DO Ricarda  August 31, 2022 10:38 EDT    Ewelina Chowdary DO, FACOI  [address] [place] [address]  [phone] (office)    Part of this note may be an electronic transcription/translation of spoken language to printed text using the Dragon Dictation System.

## 2022-08-31 NOTE — PROGRESS NOTES
Venipuncture Blood Specimen Collection  Venipuncture performed in LEFT ARM by Kimberley Danielle RN with good hemostasis. Patient tolerated the procedure well without complications.   08/31/22   Kimberley Danielle RN

## 2022-09-15 ENCOUNTER — TELEPHONE (OUTPATIENT)
Dept: FAMILY MEDICINE CLINIC | Facility: CLINIC | Age: 87
End: 2022-09-15

## 2022-09-15 NOTE — TELEPHONE ENCOUNTER
----- Message from Ewelina Chowdary DO sent at 8/31/2022 10:13 AM EDT -----  Please schedule patient an appt with Marcella Mccauley radiation oncologist that he has seen. I don't think he needs new referral since he is established with them but missed his follow up in the spring.

## 2022-09-16 NOTE — TELEPHONE ENCOUNTER
Called Radiation Oncology at [phone] and appt was made per Susanna for 9/29/22 at 10:30.  Pt notified of appt details.

## 2022-11-30 ENCOUNTER — OFFICE VISIT (OUTPATIENT)
Dept: FAMILY MEDICINE CLINIC | Facility: CLINIC | Age: 87
End: 2022-11-30

## 2022-11-30 VITALS
HEART RATE: 62 BPM | OXYGEN SATURATION: 99 % | TEMPERATURE: 98 F | WEIGHT: 177.8 LBS | DIASTOLIC BLOOD PRESSURE: 60 MMHG | SYSTOLIC BLOOD PRESSURE: 138 MMHG | BODY MASS INDEX: 26.24 KG/M2

## 2022-11-30 DIAGNOSIS — I10 ESSENTIAL HYPERTENSION: Chronic | ICD-10-CM

## 2022-11-30 DIAGNOSIS — R41.3 MEMORY IMPAIRMENT OF GRADUAL ONSET: Chronic | ICD-10-CM

## 2022-11-30 DIAGNOSIS — E78.00 HYPERCHOLESTEROLEMIA: Primary | Chronic | ICD-10-CM

## 2022-11-30 DIAGNOSIS — R73.01 IMPAIRED FASTING GLUCOSE: ICD-10-CM

## 2022-11-30 PROCEDURE — 99214 OFFICE O/P EST MOD 30 MIN: CPT | Performed by: INTERNAL MEDICINE

## 2022-11-30 RX ORDER — FLUOROURACIL 50 MG/G
CREAM TOPICAL
COMMUNITY
Start: 2022-11-29

## 2022-11-30 RX ORDER — DONEPEZIL HYDROCHLORIDE 10 MG/1
10 TABLET, FILM COATED ORAL NIGHTLY
Qty: 90 TABLET | Refills: 1 | Status: SHIPPED | OUTPATIENT
Start: 2022-11-30 | End: 2023-03-30 | Stop reason: SDUPTHER

## 2022-11-30 NOTE — PROGRESS NOTES
Patient Name: Clinton Ramirez Today's Date: 2022   Patient MRN / CSN: [id number] / [id number] Date of Encounter: 2022   Patient Age / : 87 y.o. / 1935 Encounter Provider: Ewelina Chowdary DO   Referring Physician: No ref. provider found          Clinton is a 87 y.o. male who is being seen today for Follow-up      Hypertension  This is a chronic problem. The current episode started more than 1 year ago. The problem is controlled. Pertinent negatives include no chest pain or shortness of breath. Current antihypertension treatment includes calcium channel blockers and angiotensin blockers. The current treatment provides significant improvement. There are no compliance problems.    Hyperlipidemia  This is a chronic problem. The current episode started more than 1 year ago. Pertinent negatives include no chest pain or shortness of breath. Current antihyperlipidemic treatment includes statins. The current treatment provides significant improvement of lipids. There are no compliance problems.           Allergies include:Patient has no known allergies.  Current Outpatient Medications   Medication Sig Dispense Refill   • aspirin 81 MG chewable tablet Chew 81 mg Every Morning.     • atorvastatin (LIPITOR) 40 MG tablet TAKE 1 TABLET DAILY 90 tablet 3   • donepezil (ARICEPT) 10 MG tablet Take 1 tablet by mouth Every Night. 90 tablet 1   • felodipine (PLENDIL) 2.5 MG 24 hr tablet TAKE 3 TABLETS DAILY (Patient taking differently: Take 2.5 mg by mouth 2 (Two) Times a Day.) 270 tablet 3   • fluorouracil (EFUDEX) 5 % cream      • ibuprofen (ADVIL,MOTRIN) 200 MG tablet Take 200 mg by mouth Every 6 (Six) Hours As Needed for Mild Pain .     • losartan (COZAAR) 25 MG tablet Take 0.5 tablets by mouth Daily. 45 tablet 2     Current Facility-Administered Medications   Medication Dose Route Frequency Provider Last Rate Last Admin   • lidocaine (XYLOCAINE) 2 % jelly   Topical PRN Jenny Mccauley APRN         Past  Medical History:   Diagnosis Date   • Arthritis     hands   • Cervical radiculopathy 2016   • Elevated cholesterol    • Hypertension    • Hypokalemia    • Multiple benign polyps of large intestine    • Need for prophylactic vaccination and inoculation against influenza    • Pancreatitis    • Paralytic ileus (HCC)    • PPD positive     negative chest xray    • Renal insufficiency     mild-told to stop Naproxen   • Rheumatic fever     age 20 while in -hospitalized    • Skin cancer     basal cell from ears, melanoma from neck area    • Valvular disease    • Wears glasses    • Wears partial dentures     upper      Family History   Problem Relation Age of Onset   • Pancreatic cancer Sister    • Tuberculosis Other    • Diabetes Other    • Heart disease Father    • Lung cancer Brother    • Leukemia Sister      Past Surgical History:   Procedure Laterality Date   • BIOPSY PROSTATE NEEDLE / PUNCH / INCISIONAL     • CARDIAC CATHETERIZATION N/A 2018    Procedure: Left Heart Cath;  Surgeon: Claire Scott MD;  Location: [facility] INVASIVE LOCATION;  Service: Cardiovascular   • COLONOSCOPY     • EAR BIOPSY      basal cell skin cancer   • SKIN CANCER EXCISION      melanoma from neck    • THROAT SURGERY      Biopsy     Social History     Substance and Sexual Activity   Alcohol Use Not Currently    Comment: up to 3 beers per day, not everyday     Social History     Tobacco Use   Smoking Status Former   • Packs/day: 3.00   • Years: 11.00   • Pack years: 33.00   • Types: Cigarettes   • Quit date:    • Years since quittin.9   Smokeless Tobacco Never     Social History     Substance and Sexual Activity   Drug Use No     Review of Systems   Constitutional: Negative for fever.   Respiratory: Negative for shortness of breath.    Cardiovascular: Negative for chest pain.   Gastrointestinal: Negative for abdominal pain and blood in stool.   Genitourinary: Negative for hematuria.   Skin:        Facial skin lesions  removed yesterday from Derm. He reports a bx was done and he plans to follow up with them soon.         Depression Assessment Review:  PHQ-9 Total Score:    Vital Signs & Measurements Taken This Encounter  /60 (BP Location: Left arm, Patient Position: Sitting, Cuff Size: Adult)   Pulse 62   Temp 98 °F (36.7 °C) (Temporal)   Wt 80.6 kg (177 lb 12.8 oz)   SpO2 99%   BMI 26.24 kg/m²    SpO2 Percentage    11/30/22 1021   SpO2: 99%        BMI is >= 25 and <30. (Overweight) The following options were offered after discussion;: BMI is acceptable for patient age.       Physical Exam  Vitals reviewed.   Constitutional:       General: He is not in acute distress.  HENT:      Head: Normocephalic and atraumatic.   Eyes:      General: No scleral icterus.     Extraocular Movements: Extraocular movements intact.      Conjunctiva/sclera: Conjunctivae normal.      Pupils: Pupils are equal, round, and reactive to light.   Cardiovascular:      Rate and Rhythm: Normal rate and regular rhythm.   Pulmonary:      Effort: Pulmonary effort is normal. No respiratory distress.      Breath sounds: Normal breath sounds. No wheezing or rhonchi.   Musculoskeletal:         General: No swelling.      Cervical back: Neck supple. No tenderness.   Lymphadenopathy:      Cervical: No cervical adenopathy.   Skin:     General: Skin is warm and dry.      Coloration: Skin is not jaundiced.      Comments: Bandage over right cheek from recent skin excision.   Neurological:      Mental Status: He is alert.   Psychiatric:         Mood and Affect: Mood normal.         Behavior: Behavior normal.              Assessment & Plan  Patient Active Problem List   Diagnosis   • Bone spur   • Elevated prostate specific antigen (PSA)   • Hypercholesterolemia   • Essential hypertension   • Impaired fasting glucose   • Osteoarthritis   • Recent skin changes   • Tinnitus   • Valvular heart disease, mild to moderate aortic stenosis February 2020   • Dyspnea on  exertion   • Right bundle branch block   • Sinus arrhythmia   • Abnormal stress test   • History of kidney disease   • Coronary artery disease, 50% mid circumflex lesion and nonobstructive PDA lesion   • Bradycardia, sinus   • Palpitations   • Memory impairment of gradual onset   • Paroxysmal SVT (supraventricular tachycardia) (HCC)   • Cancer of parotid gland (HCC)       ICD-10-CM ICD-9-CM   1. Hypercholesterolemia  E78.00 272.0   2. Essential hypertension  I10 401.9   3. Impaired fasting glucose  R73.01 790.21   4. Memory impairment of gradual onset  R41.3 780.93     Orders Placed This Encounter   Procedures   • CBC (No Diff)     Standing Status:   Future     Standing Expiration Date:   11/30/2023     Order Specific Question:   Release to patient     Answer:   Routine Release   • Comprehensive Metabolic Panel     Standing Status:   Future     Standing Expiration Date:   11/30/2023     Order Specific Question:   Release to patient     Answer:   Routine Release   • Hemoglobin A1c     Standing Status:   Future     Standing Expiration Date:   11/30/2023     Order Specific Question:   Release to patient     Answer:   Routine Release   • Lipid Panel     Standing Status:   Future     Standing Expiration Date:   11/30/2023   • TSH     Standing Status:   Future     Standing Expiration Date:   11/30/2023     Order Specific Question:   Release to patient     Answer:   Routine Release   • T4, Free     Standing Status:   Future     Standing Expiration Date:   11/30/2023       Meds Ordered During Visit:  New Medications Ordered This Visit   Medications   • donepezil (ARICEPT) 10 MG tablet     Sig: Take 1 tablet by mouth Every Night.     Dispense:  90 tablet     Refill:  1     I reviewed med list and updated refill as requested today.  We will continue current medicines.  We will update labs before next appointment.  I encouraged patient to follow-up with his dermatologist as planned.  In regards to oncology follow-up from the  parotid gland, patient reports he was told that he would only follow-up with him as needed at this point.  He reports doing well and does not feel he needs to follow-up with him at this time.    Return in about 4 months (around 3/30/2023), or if symptoms worsen or fail to improve, for Recheck.          Referring Provider (if known): No ref. provider found      This document has been electronically signed by Ewelina Chowdary DO  November 30, 2022 11:24 EST    Ewelina Chowdary DO, FACOI  [address] [place] [address]  [phone] (office)    Part of this note may be an electronic transcription/translation of spoken language to printed text using the Dragon Dictation System.

## 2022-12-28 ENCOUNTER — TELEPHONE (OUTPATIENT)
Dept: FAMILY MEDICINE CLINIC | Facility: CLINIC | Age: 87
End: 2022-12-28

## 2022-12-28 DIAGNOSIS — I10 ESSENTIAL HYPERTENSION: ICD-10-CM

## 2022-12-28 RX ORDER — FELODIPINE 2.5 MG/1
7.5 TABLET, EXTENDED RELEASE ORAL DAILY
Qty: 270 TABLET | Refills: 3 | Status: SHIPPED | OUTPATIENT
Start: 2022-12-28

## 2022-12-28 NOTE — TELEPHONE ENCOUNTER
Caller: Clinton Ramirez    Relationship: Self    Best call back number: [phone]    What is the best time to reach you: ANY    Who are you requesting to speak with (clinical staff, provider,  specific staff member): CLINICAL    What was the call regarding: THE PATIENT STATES THAT HE IS ALMOST OUT OF THIS MEDICATION AND IS CALLING TO CHECK ON THE REFILL.  felodipine (PLENDIL) 2.5 MG 24 hr tablet    Do you require a callback: IF NEEDED.    THANK YOU.

## 2023-01-06 ENCOUNTER — OFFICE VISIT (OUTPATIENT)
Dept: CARDIOLOGY | Facility: CLINIC | Age: 88
End: 2023-01-06
Payer: MEDICARE

## 2023-01-06 VITALS
SYSTOLIC BLOOD PRESSURE: 133 MMHG | BODY MASS INDEX: 26.51 KG/M2 | WEIGHT: 179 LBS | OXYGEN SATURATION: 97 % | HEART RATE: 73 BPM | HEIGHT: 69 IN | DIASTOLIC BLOOD PRESSURE: 67 MMHG

## 2023-01-06 DIAGNOSIS — I25.10 CORONARY ARTERY DISEASE INVOLVING NATIVE CORONARY ARTERY OF NATIVE HEART WITHOUT ANGINA PECTORIS: Chronic | ICD-10-CM

## 2023-01-06 DIAGNOSIS — R00.2 PALPITATIONS: ICD-10-CM

## 2023-01-06 DIAGNOSIS — I38 VALVULAR HEART DISEASE: Primary | Chronic | ICD-10-CM

## 2023-01-06 PROCEDURE — 99213 OFFICE O/P EST LOW 20 MIN: CPT | Performed by: STUDENT IN AN ORGANIZED HEALTH CARE EDUCATION/TRAINING PROGRAM

## 2023-01-06 NOTE — PROGRESS NOTES
Electrophysiology Clinic Consult     Clinton Ramirez  1935    01/06/23    DATE OF ADMISSION: (Not on file)  [facility] CARDIOLOGY [place]    Ewelina Chowdary, DO  [address] / [place] [address]  Referring Provider: No ref. provider found     Chief Complaint   Patient presents with   • Valvular heart disease, mild to moderate AS     Referring provider: Dr. Torres    Problem list    1. SVT  a. 21-hour Holter monitor, 10/16/2018, Avg Hr 65, Min HR 47, Max .  Predominantly normal sinus rhythm.  1 3 beat run SVT at 143 bpm.  b. Holter monitor, 24-hour, 2/25/2020, Avg HR 65, Min HR 43, Max Hr 107.  Normal sinus rhythm with RBBB.  PACs occasionally.  No A. fib.  No pauses.  c. 14 day Holter monitor, 9/24/2021, Avg HR 67, Min HR 40, Max  bpm.  Predominantly sinus rhythm. 0% AF. 0.12% PVC burden.  246 events of SVT, longest 53 seconds, fastest 159 bpm.  d. Initiated on Toprol-XL 12.5 mg   2. CAD  a. Echo, 5/11/2018, EF 61 - 65%, mild AR.  Mild to mod AS.  Mild MR.  b. Stress test, 6/28/2018, EF over 70%.  No ischemia  c. C, 7/11/2018, single-vessel coronary artery disease with 50% stenosis of the mid circumflex, EF 65%.  Mild aortic stenosis.  No PCI  d. Echo, 2/28/2020, EF 56-60%, mild calcification of aortic valve with mild aortic valve stenosis.  Mild MAC.  Bileaflet mitral valve thickening present.  Mild TR.  Moderate pulmonary hypertension.  LA mildly dilated.  e. Bilateral carotid ultrasound, 5/27/2021,  No acute findings in the arteries of the neck.  3. Hypertension  4. Hyperlipidemia  5. Valvular heart disease  a. Mild to moderate AAS  6. Arthritis  7. Multiple benign polyps of large intestine  8. Pancreatitis  9. 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 Negative chest x-ray  11. Mild renal insufficiency  12. History of rheumatic fever  a.  at age 20 while in  hospital  13. Skin cancer  a. Basal cell from ears, melanoma from neck  b. Currently follow  with dr. Dupree undergoing radiation.   14. Mild Dementia       History of Present Illness:     Mr. Ramirez is a pleasant 87 year old WM with above noted PMH.  Plan today for follow-up regarding SVT.  Patient states from a heartbeat standpoint he has had no issues.  He denies any rapid heartbeats dizziness nursing proceed.  He follows locally with Dr. Torres for his coronary disease and valvular heart disease.  He has had no chest pain or worsening heart failure symptoms.  He states his blood pressures been doing well.  Overall he has no complaints today.  No Known Allergies     Cannot display prior to admission medications because the patient has not been admitted in this contact.            Current Outpatient Medications:   •  aspirin 81 MG chewable tablet, Chew 81 mg Every Morning., Disp: , Rfl:   •  atorvastatin (LIPITOR) 40 MG tablet, TAKE 1 TABLET DAILY, Disp: 90 tablet, Rfl: 3  •  donepezil (ARICEPT) 10 MG tablet, Take 1 tablet by mouth Every Night., Disp: 90 tablet, Rfl: 1  •  felodipine (PLENDIL) 2.5 MG 24 hr tablet, Take 3 tablets by mouth Daily., Disp: 270 tablet, Rfl: 3  •  fluorouracil (EFUDEX) 5 % cream, , Disp: , Rfl:   •  ibuprofen (ADVIL,MOTRIN) 200 MG tablet, Take 200 mg by mouth Every 6 (Six) Hours As Needed for Mild Pain ., Disp: , Rfl:   •  losartan (COZAAR) 25 MG tablet, Take 0.5 tablets by mouth Daily., Disp: 45 tablet, Rfl: 2    Current Facility-Administered Medications:   •  lidocaine (XYLOCAINE) 2 % jelly, , Topical, PRN, Garrick, Jenny Mullins, APRN    Social History     Socioeconomic History   • Marital status:    Tobacco Use   • Smoking status: Former     Packs/day: 3.00     Years: 11.00     Pack years: 33.00     Types: Cigarettes     Quit date:      Years since quittin.0   • Smokeless tobacco: Never   Vaping Use   • Vaping Use: Never used   Substance and Sexual Activity   • Alcohol use: Not Currently     Comment: up to 3 beers per day, not everyday   • Drug use: No   • Sexual  activity: Defer     Partners: Female     Birth control/protection: None       Family History   Problem Relation Age of Onset   • Pancreatic cancer Sister    • Tuberculosis Other    • Diabetes Other    • Heart disease Father    • Lung cancer Brother    • Leukemia Sister          Vitals:    01/06/23 1259   BP: 133/67   BP Location: Right arm   Patient Position: Sitting   Cuff Size: Adult   Pulse: 73   SpO2: 97%   Weight: 81.2 kg (179 lb)   Height: 175.3 cm (69\")                 Physical Exam:  GEN: Well nourished, well-developed, no acute distress  HEENT: Normocephalic, atraumatic, PERRLA, moist mucous membranes  NECK: Supple, NO JVD, no thyromegaly, no lymphadenopathy  CARD: S1S2, 2/6 systolic ejection murmur  LUNGS: Clear to auscultation, normal respiratory effort  ABDOMEN: Soft, nontender, normal bowel sounds  EXTREMITIES: No gross deformities, no clubbing, cyanosis, edema edema  SKIN: Warm, dry  NEURO: No focal deficits, alert and oriented x 3  PSYCHIATRIC: Normal affect and mood      I personally viewed and interpreted the patient's EKG/Telemetry/lab data    Lab Results   Component Value Date    GLUCOSE 114 (H) 08/31/2022    CALCIUM 9.6 08/31/2022     08/31/2022    K 4.1 08/31/2022    CO2 24.6 08/31/2022     08/31/2022    BUN 16 08/31/2022    CREATININE 1.18 08/31/2022    EGFRIFNONA 93 12/30/2021    BCR 13.6 08/31/2022    ANIONGAP 12.4 08/31/2022     Lab Results   Component Value Date    WBC 9.88 08/31/2022    HGB 15.1 08/31/2022    HCT 44.4 08/31/2022    MCV 92.9 08/31/2022     08/31/2022     No results found for: INR, PROTIME  Lab Results   Component Value Date    TSH 5.680 (H) 08/31/2022           Procedures      ICD-10-CM ICD-9-CM   1. Valvular heart disease, mild to moderate aortic stenosis February 2020  I38 424.90   2. Coronary artery disease involving native coronary artery of native heart without angina pectoris  I25.10 414.01   3. Palpitations  R00.2 785.1       Assessment and Plan:      1. SVT   -Noted on recent 14 day holter monitor, 9/24/21, 246 episodes of SVT, longest 53s and fastest 149 bpm. Reinitiated on Toprol XL 12.5 mg daily. Patient asymptomatic with episodes of SVT, overall episodes very brief.   -Patient continues to do well with no symptomatic episodes of tachypalpitations dizziness or near syncope.    2. Bradycardia  -Asymptomatic currently.  Improved off medications.  He has no complaints today.  3.CAD  -Continue medications, per Dr. Torres  - Echo, 2/28/2020, EF 56-60%, mild calcification of aortic valve with mild aortic valve stenosis.  Mild MAC.  Bileaflet mitral valve thickening present.  Mild TR.  Moderate pulmonary hypertension.  LA mildly dilated.    4. HTN  -Well controlled, continue medications    5. HLD  -Continue Statin     Stable course with SVT.  Consider monitor things now.  At this point we recommended 1 year follow-up  Electronically signed by SONA Archuleta, 01/06/23, 1:14 PM EST.

## 2023-02-20 ENCOUNTER — OFFICE VISIT (OUTPATIENT)
Dept: CARDIOLOGY | Facility: CLINIC | Age: 88
End: 2023-02-20
Payer: MEDICARE

## 2023-02-20 VITALS
DIASTOLIC BLOOD PRESSURE: 68 MMHG | OXYGEN SATURATION: 98 % | HEART RATE: 58 BPM | HEIGHT: 69 IN | SYSTOLIC BLOOD PRESSURE: 130 MMHG | WEIGHT: 179 LBS | BODY MASS INDEX: 26.51 KG/M2

## 2023-02-20 DIAGNOSIS — I47.1 PAROXYSMAL SVT (SUPRAVENTRICULAR TACHYCARDIA): ICD-10-CM

## 2023-02-20 DIAGNOSIS — R00.1 BRADYCARDIA, SINUS: ICD-10-CM

## 2023-02-20 DIAGNOSIS — I25.10 CORONARY ARTERY DISEASE INVOLVING NATIVE CORONARY ARTERY OF NATIVE HEART WITHOUT ANGINA PECTORIS: Chronic | ICD-10-CM

## 2023-02-20 DIAGNOSIS — I10 ESSENTIAL HYPERTENSION: Chronic | ICD-10-CM

## 2023-02-20 DIAGNOSIS — I38 VALVULAR HEART DISEASE: Primary | Chronic | ICD-10-CM

## 2023-02-20 PROCEDURE — 99214 OFFICE O/P EST MOD 30 MIN: CPT | Performed by: INTERNAL MEDICINE

## 2023-02-20 PROCEDURE — 93000 ELECTROCARDIOGRAM COMPLETE: CPT | Performed by: INTERNAL MEDICINE

## 2023-02-20 NOTE — PROGRESS NOTES
subjective     Chief Complaint   Patient presents with   • Slow Heart Rate     Follow up   • Coronary Artery Disease       Follow up     History of Present Illness  Patient is 87 years old gentleman who is here for cardiology follow-up.  He has history of tachybradycardia syndrome and paroxysmal atrial tachycardia  He is doing very well he is physically very active.  Denies any chest pain palpitations or shortness of breath.  No dizziness syncope or presyncope.  Pacemaker options were also discussed.  Patient however is totally asymptomatic.    Coronary artery disease nonobstructive asymptomatic.    Mild aortic stenosis, last echo 3 years ago asymptomatic.  Hyperlipidemia on Lipitor 40 mg daily.    Blood pressure is very well controlled on his losartan and Plendil.    Past Surgical History:   Procedure Laterality Date   • BIOPSY PROSTATE NEEDLE / PUNCH / INCISIONAL     • CARDIAC CATHETERIZATION N/A 7/11/2018    Procedure: Left Heart Cath;  Surgeon: Claire Scott MD;  Location: [facility] CATH INVASIVE LOCATION;  Service: Cardiovascular   • COLONOSCOPY     • EAR BIOPSY      basal cell skin cancer   • SKIN CANCER EXCISION      melanoma from neck    • THROAT SURGERY      Biopsy     Family History   Problem Relation Age of Onset   • Pancreatic cancer Sister    • Tuberculosis Other    • Diabetes Other    • Heart disease Father    • Lung cancer Brother    • Leukemia Sister      Past Medical History:   Diagnosis Date   • Arthritis     hands   • Cervical radiculopathy 5/11/2016   • Elevated cholesterol    • Hypertension    • Hypokalemia    • Multiple benign polyps of large intestine    • Need for prophylactic vaccination and inoculation against influenza    • Pancreatitis    • Paralytic ileus (HCC)    • PPD positive     negative chest xray    • Renal insufficiency     mild-told to stop Naproxen   • Rheumatic fever     age 20 while in -hospitalized    • Skin cancer     basal cell from ears, melanoma from neck area    •  Valvular disease    • Wears glasses    • Wears partial dentures     upper      Patient Active Problem List   Diagnosis   • Bone spur   • Elevated prostate specific antigen (PSA)   • Hypercholesterolemia   • Essential hypertension   • Impaired fasting glucose   • Osteoarthritis   • Recent skin changes   • Tinnitus   • Valvular heart disease, mild aortic stenosis 2020   • Dyspnea on exertion   • Right bundle branch block   • Sinus arrhythmia   • Abnormal stress test   • History of kidney disease   • Coronary artery disease, 50% mid circumflex lesion and nonobstructive PDA lesion   • Bradycardia, sinus   • Palpitations   • Memory impairment of gradual onset   • Paroxysmal SVT (supraventricular tachycardia) (HCC)   • Cancer of parotid gland (HCC)       Social History     Tobacco Use   • Smoking status: Former     Packs/day: 3.00     Years: 11.00     Pack years: 33.00     Types: Cigarettes     Quit date:      Years since quittin.1   • Smokeless tobacco: Never   Vaping Use   • Vaping Use: Never used   Substance Use Topics   • Alcohol use: Not Currently     Comment: up to 3 beers per day, not everyday   • Drug use: No       No Known Allergies    Current Outpatient Medications on File Prior to Visit   Medication Sig   • aspirin 81 MG chewable tablet Chew 81 mg Every Morning.   • atorvastatin (LIPITOR) 40 MG tablet TAKE 1 TABLET DAILY   • donepezil (ARICEPT) 10 MG tablet Take 1 tablet by mouth Every Night.   • felodipine (PLENDIL) 2.5 MG 24 hr tablet Take 3 tablets by mouth Daily.   • fluorouracil (EFUDEX) 5 % cream    • ibuprofen (ADVIL,MOTRIN) 200 MG tablet Take 200 mg by mouth Every 6 (Six) Hours As Needed for Mild Pain .   • losartan (COZAAR) 25 MG tablet Take 0.5 tablets by mouth Daily.     Current Facility-Administered Medications on File Prior to Visit   Medication   • lidocaine (XYLOCAINE) 2 % jelly         The following portions of the patient's history were reviewed and updated as appropriate:  "allergies, current medications, past family history, past medical history, past social history, past surgical history and problem list.    Review of Systems   Constitutional: Negative.   HENT: Negative.  Negative for congestion.    Eyes: Negative.    Cardiovascular: Negative.  Negative for chest pain, cyanosis, dyspnea on exertion, irregular heartbeat, leg swelling, near-syncope, orthopnea, palpitations, paroxysmal nocturnal dyspnea and syncope.   Respiratory: Negative.  Negative for shortness of breath.    Hematologic/Lymphatic: Negative.    Musculoskeletal: Negative.    Gastrointestinal: Negative.    Neurological: Negative.  Negative for headaches.          Objective:     /68   Pulse 58   Ht 175.3 cm (69\")   Wt 81.2 kg (179 lb)   SpO2 98%   BMI 26.43 kg/m²   Pulmonary:      Effort: Pulmonary effort is normal.      Breath sounds: Normal breath sounds. No stridor. No wheezing. No rhonchi. No rales.   Cardiovascular:      PMI at left midclavicular line. Bradycardia present. Irregular rhythm. Normal S1. Normal S2.      Murmurs: There is no murmur.      No gallop. No click. No rub.   Pulses:     Intact distal pulses.   Edema:     Peripheral edema absent.           Lab Review  Lab Results   Component Value Date     08/31/2022    K 4.1 08/31/2022     08/31/2022    BUN 16 08/31/2022    CREATININE 1.18 08/31/2022    GLUCOSE 114 (H) 08/31/2022    CALCIUM 9.6 08/31/2022    ALT 26 08/31/2022    ALKPHOS 92 08/31/2022    LABIL2 1.3 (L) 05/12/2016     Lab Results   Component Value Date    CKTOTAL 43 03/07/2019     Lab Results   Component Value Date    WBC 9.88 08/31/2022    HGB 15.1 08/31/2022    HCT 44.4 08/31/2022     08/31/2022     No results found for: INR  No results found for: MG  Lab Results   Component Value Date    TSH 5.680 (H) 08/31/2022     No results found for: BNP  Lab Results   Component Value Date    CHLPL 166 05/12/2016    CHOL 141 08/31/2022    TRIG 130 08/31/2022    HDL 54 " 08/31/2022    VLDL 23 08/31/2022    LDLHDL 1.13 08/31/2022     Lab Results   Component Value Date    LDL 64 08/31/2022    LDL 55 12/30/2021     No results found for: PROBNP  CARDIAC LABS:            ECG 12 Lead    Date/Time: 2/20/2023 1:03 PM  Performed by: Sandra Torres MD  Authorized by: Sandra Torres MD   Comparison: compared with previous ECG from 5/13/2022  Comparison to previous ECG: Heart rate is faster, it was 44 last visit  Rhythm: sinus bradycardia  Ectopy: atrial premature contractions  Rate: bradycardic  BPM: 58  Conduction: right bundle branch block  QRS axis: normal  Other findings: non-specific ST-T wave changes    Clinical impression: abnormal EKG               I personally viewed and interpreted the patient's LAB data         Assessment:     1. Valvular heart disease, mild to moderate aortic stenosis February 2020    2. Paroxysmal SVT (supraventricular tachycardia) (HCC)    3. Bradycardia, sinus    4. Essential hypertension    5. Coronary artery disease involving native coronary artery of native heart without angina pectoris          Plan:     Patient is 87 years old gentleman who is remarkably healthy for his age and is very active.  He denies any chest pain palpitations shortness of breath syncope, orthopnea PND or ankle edema.    Blood pressure is very well controlled he is taking Plendil and losartan.  Bradycardia is stable heart rate is around 58 bpm with occasional PACs.    Coronary artery disease asymptomatic nonobstructive  No change in therapy was made.    Aortic stenosis mild last echo 3 years ago we will have repeat echo this summer.  Follow-up in 6 months healthy lifestyle emphasized        No follow-ups on file.

## 2023-03-30 ENCOUNTER — OFFICE VISIT (OUTPATIENT)
Dept: FAMILY MEDICINE CLINIC | Facility: CLINIC | Age: 88
End: 2023-03-30
Payer: MEDICARE

## 2023-03-30 VITALS
BODY MASS INDEX: 26.23 KG/M2 | DIASTOLIC BLOOD PRESSURE: 72 MMHG | SYSTOLIC BLOOD PRESSURE: 126 MMHG | TEMPERATURE: 97.7 F | OXYGEN SATURATION: 96 % | HEART RATE: 56 BPM | WEIGHT: 177.6 LBS

## 2023-03-30 DIAGNOSIS — R73.01 IMPAIRED FASTING GLUCOSE: ICD-10-CM

## 2023-03-30 DIAGNOSIS — I10 ESSENTIAL HYPERTENSION: Primary | ICD-10-CM

## 2023-03-30 DIAGNOSIS — I73.9 PAD (PERIPHERAL ARTERY DISEASE): ICD-10-CM

## 2023-03-30 DIAGNOSIS — R41.3 MEMORY IMPAIRMENT OF GRADUAL ONSET: Chronic | ICD-10-CM

## 2023-03-30 DIAGNOSIS — E78.00 HYPERCHOLESTEROLEMIA: Chronic | ICD-10-CM

## 2023-03-30 DIAGNOSIS — R53.83 FATIGUE, UNSPECIFIED TYPE: ICD-10-CM

## 2023-03-30 PROBLEM — C07 CANCER OF PAROTID GLAND: Chronic | Status: RESOLVED | Noted: 2021-12-30 | Resolved: 2023-03-30

## 2023-03-30 LAB
ALBUMIN SERPL-MCNC: 4.4 G/DL (ref 3.5–5.2)
ALBUMIN/GLOB SERPL: 1.3 G/DL
ALP SERPL-CCNC: 105 U/L (ref 39–117)
ALT SERPL W P-5'-P-CCNC: 21 U/L (ref 1–41)
ANION GAP SERPL CALCULATED.3IONS-SCNC: 12 MMOL/L (ref 5–15)
AST SERPL-CCNC: 27 U/L (ref 1–40)
BILIRUB SERPL-MCNC: 0.7 MG/DL (ref 0–1.2)
BUN SERPL-MCNC: 14 MG/DL (ref 8–23)
BUN/CREAT SERPL: 11.6 (ref 7–25)
CALCIUM SPEC-SCNC: 9.7 MG/DL (ref 8.6–10.5)
CHLORIDE SERPL-SCNC: 102 MMOL/L (ref 98–107)
CHOLEST SERPL-MCNC: 141 MG/DL (ref 0–200)
CO2 SERPL-SCNC: 24 MMOL/L (ref 22–29)
CREAT SERPL-MCNC: 1.21 MG/DL (ref 0.76–1.27)
DEPRECATED RDW RBC AUTO: 40.5 FL (ref 37–54)
EGFRCR SERPLBLD CKD-EPI 2021: 57.6 ML/MIN/1.73
ERYTHROCYTE [DISTWIDTH] IN BLOOD BY AUTOMATED COUNT: 12 % (ref 12.3–15.4)
GLOBULIN UR ELPH-MCNC: 3.3 GM/DL
GLUCOSE SERPL-MCNC: 85 MG/DL (ref 65–99)
HBA1C MFR BLD: 5.8 % (ref 4.8–5.6)
HCT VFR BLD AUTO: 43.9 % (ref 37.5–51)
HDLC SERPL-MCNC: 41 MG/DL (ref 40–60)
HGB BLD-MCNC: 14.7 G/DL (ref 13–17.7)
LDLC SERPL CALC-MCNC: 76 MG/DL (ref 0–100)
LDLC/HDLC SERPL: 1.79 {RATIO}
MCH RBC QN AUTO: 30.9 PG (ref 26.6–33)
MCHC RBC AUTO-ENTMCNC: 33.5 G/DL (ref 31.5–35.7)
MCV RBC AUTO: 92.2 FL (ref 79–97)
PLATELET # BLD AUTO: 256 10*3/MM3 (ref 140–450)
PMV BLD AUTO: 12.4 FL (ref 6–12)
POTASSIUM SERPL-SCNC: 4.3 MMOL/L (ref 3.5–5.2)
PROT SERPL-MCNC: 7.7 G/DL (ref 6–8.5)
RBC # BLD AUTO: 4.76 10*6/MM3 (ref 4.14–5.8)
SODIUM SERPL-SCNC: 138 MMOL/L (ref 136–145)
T4 FREE SERPL-MCNC: 1.27 NG/DL (ref 0.93–1.7)
TRIGL SERPL-MCNC: 133 MG/DL (ref 0–150)
TSH SERPL DL<=0.05 MIU/L-ACNC: 6.15 UIU/ML (ref 0.27–4.2)
VIT B12 BLD-MCNC: 355 PG/ML (ref 211–946)
VLDLC SERPL-MCNC: 24 MG/DL (ref 5–40)
WBC NRBC COR # BLD: 9.62 10*3/MM3 (ref 3.4–10.8)

## 2023-03-30 PROCEDURE — 1159F MED LIST DOCD IN RCRD: CPT | Performed by: INTERNAL MEDICINE

## 2023-03-30 PROCEDURE — 36415 COLL VENOUS BLD VENIPUNCTURE: CPT | Performed by: INTERNAL MEDICINE

## 2023-03-30 PROCEDURE — 83036 HEMOGLOBIN GLYCOSYLATED A1C: CPT | Performed by: INTERNAL MEDICINE

## 2023-03-30 PROCEDURE — 99214 OFFICE O/P EST MOD 30 MIN: CPT | Performed by: INTERNAL MEDICINE

## 2023-03-30 PROCEDURE — 82607 VITAMIN B-12: CPT | Performed by: INTERNAL MEDICINE

## 2023-03-30 PROCEDURE — 80053 COMPREHEN METABOLIC PANEL: CPT | Performed by: INTERNAL MEDICINE

## 2023-03-30 PROCEDURE — 84439 ASSAY OF FREE THYROXINE: CPT | Performed by: INTERNAL MEDICINE

## 2023-03-30 PROCEDURE — 84443 ASSAY THYROID STIM HORMONE: CPT | Performed by: INTERNAL MEDICINE

## 2023-03-30 PROCEDURE — 80061 LIPID PANEL: CPT | Performed by: INTERNAL MEDICINE

## 2023-03-30 PROCEDURE — 85027 COMPLETE CBC AUTOMATED: CPT | Performed by: INTERNAL MEDICINE

## 2023-03-30 PROCEDURE — 1160F RVW MEDS BY RX/DR IN RCRD: CPT | Performed by: INTERNAL MEDICINE

## 2023-03-30 RX ORDER — LOSARTAN POTASSIUM 25 MG/1
12.5 TABLET ORAL DAILY
Qty: 45 TABLET | Refills: 3 | Status: SHIPPED | OUTPATIENT
Start: 2023-03-30

## 2023-03-30 RX ORDER — LORATADINE 10 MG/1
CAPSULE, LIQUID FILLED ORAL
COMMUNITY

## 2023-03-30 RX ORDER — ATORVASTATIN CALCIUM 40 MG/1
40 TABLET, FILM COATED ORAL DAILY
Qty: 90 TABLET | Refills: 3 | Status: SHIPPED | OUTPATIENT
Start: 2023-03-30

## 2023-03-30 RX ORDER — DONEPEZIL HYDROCHLORIDE 10 MG/1
10 TABLET, FILM COATED ORAL NIGHTLY
Qty: 90 TABLET | Refills: 3 | Status: SHIPPED | OUTPATIENT
Start: 2023-03-30

## 2023-03-30 NOTE — PROGRESS NOTES
Patient Name: Clinton Ramirez Today's Date: 3/30/2023   Patient MRN / CSN: [id number] / [id number] Date of Encounter: 3/30/2023   Patient Age / : 88 y.o. / 1935 Encounter Provider: Ewelina Chowdary DO   Referring Physician: No ref. provider found          Clinton is a 88 y.o. male who is being seen today for Fatigue and Follow-up      History of Present Illness     Mr. Ramirez presents today for a follow up on Hypertension, Hyperlipidemia, coronary artery disease, peripheral arterial disease, impaired fasting glucose levels, and memory impairment.  Patient reports feeling very well with his current medicine regimen and believes he is tolerating this regimen well without any side effects.  He has noted some fatigue recently.  He denies any chest pain, shortness of air, abdominal pain, or claudication.  He has seen the cardiologist and dermatologist since last visit.    Allergies include:Patient has no known allergies.  Current Outpatient Medications   Medication Sig Dispense Refill   • aspirin 81 MG chewable tablet Chew 1 tablet Every Morning.     • atorvastatin (LIPITOR) 40 MG tablet Take 1 tablet by mouth Daily. 90 tablet 3   • donepezil (ARICEPT) 10 MG tablet Take 1 tablet by mouth Every Night. 90 tablet 3   • felodipine (PLENDIL) 2.5 MG 24 hr tablet Take 3 tablets by mouth Daily. 270 tablet 3   • fluorouracil (EFUDEX) 5 % cream      • ibuprofen (ADVIL,MOTRIN) 200 MG tablet Take 1 tablet by mouth Every 6 (Six) Hours As Needed for Mild Pain.     • Loratadine (Claritin) 10 MG capsule Take  by mouth.     • losartan (COZAAR) 25 MG tablet Take 0.5 tablets by mouth Daily. 45 tablet 3     Current Facility-Administered Medications   Medication Dose Route Frequency Provider Last Rate Last Admin   • lidocaine (XYLOCAINE) 2 % jelly   Topical PRJenny Hanna APRN         Past Medical History:   Diagnosis Date   • Arthritis     hands   • Cancer of parotid gland (HCC) 2021   • Cervical radiculopathy  2016   • Elevated cholesterol    • Hypertension    • Hypokalemia    • Multiple benign polyps of large intestine    • Need for prophylactic vaccination and inoculation against influenza    • Pancreatitis    • Paralytic ileus (HCC)    • PPD positive     negative chest xray    • Renal insufficiency     mild-told to stop Naproxen   • Rheumatic fever     age 20 while in -hospitalized    • Skin cancer     basal cell from ears, melanoma from neck area    • Valvular disease    • Wears glasses    • Wears partial dentures     upper      Family History   Problem Relation Age of Onset   • Pancreatic cancer Sister    • Tuberculosis Other    • Diabetes Other    • Heart disease Father    • Lung cancer Brother    • Leukemia Sister      Past Surgical History:   Procedure Laterality Date   • BIOPSY PROSTATE NEEDLE / PUNCH / INCISIONAL     • CARDIAC CATHETERIZATION N/A 2018    Procedure: Left Heart Cath;  Surgeon: Claire Scott MD;  Location: [facility] INVASIVE LOCATION;  Service: Cardiovascular   • COLONOSCOPY     • EAR BIOPSY      basal cell skin cancer   • SKIN CANCER EXCISION      melanoma from neck    • THROAT SURGERY      Biopsy     Social History     Substance and Sexual Activity   Alcohol Use Not Currently    Comment: up to 3 beers per day, not everyday     Social History     Tobacco Use   Smoking Status Former   • Packs/day: 3.00   • Years: 11.00   • Pack years: 33.00   • Types: Cigarettes   • Quit date:    • Years since quittin.2   Smokeless Tobacco Never     Social History     Substance and Sexual Activity   Drug Use No     Review of Systems   Constitutional: Positive for fatigue. Negative for fever.   Respiratory: Negative for shortness of breath.    Cardiovascular: Negative for chest pain.        Patient denies claudication.    Gastrointestinal: Negative for abdominal pain and blood in stool.   Genitourinary: Negative for hematuria.        Depression Assessment Review:  PHQ-9 Total Score:  0  Vital Signs & Measurements Taken This Encounter  /72 (BP Location: Left arm, Patient Position: Sitting, Cuff Size: Adult)   Pulse 56   Temp 97.7 °F (36.5 °C) (Temporal)   Wt 80.6 kg (177 lb 9.6 oz)   SpO2 96%   BMI 26.23 kg/m²    SpO2 Percentage    03/30/23 0932   SpO2: 96%        BMI is >= 25 and <30. (Overweight) The following options were offered after discussion;: nutrition counseling/recommendations      Physical Exam  Vitals reviewed.   Constitutional:       General: He is not in acute distress.  HENT:      Head: Normocephalic and atraumatic.   Eyes:      General: No scleral icterus.     Extraocular Movements: Extraocular movements intact.      Conjunctiva/sclera: Conjunctivae normal.      Pupils: Pupils are equal, round, and reactive to light.   Cardiovascular:      Rate and Rhythm: Regular rhythm. Bradycardia present.      Heart sounds: Murmur heard.      Comments: +2 systolic murmur  Pulmonary:      Effort: Pulmonary effort is normal. No respiratory distress.      Breath sounds: Normal breath sounds. No wheezing or rhonchi.   Musculoskeletal:         General: No swelling.      Cervical back: Neck supple. No tenderness.   Lymphadenopathy:      Cervical: No cervical adenopathy.   Skin:     General: Skin is warm and dry.      Coloration: Skin is not jaundiced.   Neurological:      Mental Status: He is alert.   Psychiatric:         Mood and Affect: Mood normal.         Behavior: Behavior normal.         Thought Content: Thought content normal.         Judgment: Judgment normal.              Assessment & Plan  Patient Active Problem List   Diagnosis   • Bone spur   • Elevated prostate specific antigen (PSA)   • Hypercholesterolemia   • Essential hypertension   • Impaired fasting glucose   • Osteoarthritis   • Recent skin changes   • Tinnitus   • Valvular heart disease, mild aortic stenosis February 2020   • Dyspnea on exertion   • Right bundle branch block   • Sinus arrhythmia   • Abnormal stress  test   • History of kidney disease   • Coronary artery disease, 50% mid circumflex lesion and nonobstructive PDA lesion   • Bradycardia, sinus   • Palpitations   • Memory impairment of gradual onset   • Paroxysmal SVT (supraventricular tachycardia) ([facility])   • Fatigue   • PAD (peripheral artery disease) ([facility])       ICD-10-CM ICD-9-CM   1. Essential hypertension  I10 401.9   2. Hypercholesterolemia  E78.00 272.0   3. Memory impairment of gradual onset  R41.3 780.93   4. PAD (peripheral artery disease) ([facility])  I73.9 443.9   5. Fatigue, unspecified type  R53.83 780.79   6. Impaired fasting glucose  R73.01 790.21     Orders Placed This Encounter   Procedures   • Vitamin B12     Order Specific Question:   Release to patient     Answer:   Routine Release       Meds Ordered During Visit:  New Medications Ordered This Visit   Medications   • atorvastatin (LIPITOR) 40 MG tablet     Sig: Take 1 tablet by mouth Daily.     Dispense:  90 tablet     Refill:  3   • donepezil (ARICEPT) 10 MG tablet     Sig: Take 1 tablet by mouth Every Night.     Dispense:  90 tablet     Refill:  3   • losartan (COZAAR) 25 MG tablet     Sig: Take 0.5 tablets by mouth Daily.     Dispense:  45 tablet     Refill:  3     We will update labs today as previously ordered, including a1c. I will add b12 today given recent fatigue. I reviewed cardiology and dermatology notes. I encouraged patient to follow up with his specialists as planned. We will continue current medicines. I updated refills today as requested.     Return in about 4 months (around 7/30/2023), or if symptoms worsen or fail to improve, for Medicare Wellness.          Referring Provider (if known): No ref. provider found      This document has been electronically signed by Ewelina Chowdary DO  March 30, 2023 11:21 EDT    Ewelina Chowdary DO, FACOI  [address]  [place], [place] [address] [phone] (office)    Part of this note may be an electronic transcription/translation of spoken  language to printed text using the Dragon Dictation System.

## 2023-03-30 NOTE — PROGRESS NOTES
Venipuncture Blood Specimen Collection  Venipuncture performed in LEFT ARM by Kimberley Danielle RN with good hemostasis. Patient tolerated the procedure well without complications.   03/30/23   Kimberley Danielle RN

## 2023-06-08 ENCOUNTER — APPOINTMENT (OUTPATIENT)
Dept: GENERAL RADIOLOGY | Facility: HOSPITAL | Age: 88
DRG: 243 | End: 2023-06-08
Payer: MEDICARE

## 2023-06-08 ENCOUNTER — HOSPITAL ENCOUNTER (INPATIENT)
Facility: HOSPITAL | Age: 88
LOS: 2 days | Discharge: HOME | DRG: 243 | End: 2023-06-10
Attending: STUDENT IN AN ORGANIZED HEALTH CARE EDUCATION/TRAINING PROGRAM | Admitting: INTERNAL MEDICINE
Payer: MEDICARE

## 2023-06-08 DIAGNOSIS — I10 ESSENTIAL HYPERTENSION: ICD-10-CM

## 2023-06-08 DIAGNOSIS — R00.1 SYMPTOMATIC BRADYCARDIA: ICD-10-CM

## 2023-06-08 DIAGNOSIS — R00.1 BRADYCARDIA: Primary | ICD-10-CM

## 2023-06-08 DIAGNOSIS — I35.0 NONRHEUMATIC AORTIC VALVE STENOSIS: ICD-10-CM

## 2023-06-08 DIAGNOSIS — I49.5 SSS (SICK SINUS SYNDROME): ICD-10-CM

## 2023-06-08 LAB
ALBUMIN SERPL-MCNC: 3.8 G/DL (ref 3.5–5.2)
ALBUMIN/GLOB SERPL: 1.1 G/DL
ALP SERPL-CCNC: 111 U/L (ref 39–117)
ALT SERPL W P-5'-P-CCNC: 19 U/L (ref 1–41)
ANION GAP SERPL CALCULATED.3IONS-SCNC: 8.4 MMOL/L (ref 5–15)
AST SERPL-CCNC: 34 U/L (ref 1–40)
BASOPHILS # BLD AUTO: 0.04 10*3/MM3 (ref 0–0.2)
BASOPHILS NFR BLD AUTO: 0.5 % (ref 0–1.5)
BILIRUB SERPL-MCNC: 0.5 MG/DL (ref 0–1.2)
BUN SERPL-MCNC: 17 MG/DL (ref 8–23)
BUN/CREAT SERPL: 10.9 (ref 7–25)
CALCIUM SPEC-SCNC: 9 MG/DL (ref 8.6–10.5)
CHLORIDE SERPL-SCNC: 103 MMOL/L (ref 98–107)
CHOLEST SERPL-MCNC: 107 MG/DL (ref 0–200)
CK SERPL-CCNC: 75 U/L (ref 20–200)
CO2 SERPL-SCNC: 26.6 MMOL/L (ref 22–29)
CREAT SERPL-MCNC: 1.56 MG/DL (ref 0.76–1.27)
D-LACTATE SERPL-SCNC: 1.4 MMOL/L (ref 0.5–2)
DEPRECATED RDW RBC AUTO: 42.3 FL (ref 37–54)
EGFRCR SERPLBLD CKD-EPI 2021: 42.5 ML/MIN/1.73
EOSINOPHIL # BLD AUTO: 0.2 10*3/MM3 (ref 0–0.4)
EOSINOPHIL NFR BLD AUTO: 2.6 % (ref 0.3–6.2)
ERYTHROCYTE [DISTWIDTH] IN BLOOD BY AUTOMATED COUNT: 12.2 % (ref 12.3–15.4)
GEN 5 2HR TROPONIN T REFLEX: 20 NG/L
GLOBULIN UR ELPH-MCNC: 3.4 GM/DL
GLUCOSE SERPL-MCNC: 139 MG/DL (ref 65–99)
HBA1C MFR BLD: 5.7 % (ref 4.8–5.6)
HCT VFR BLD AUTO: 41.1 % (ref 37.5–51)
HDLC SERPL-MCNC: 42 MG/DL (ref 40–60)
HGB BLD-MCNC: 13.6 G/DL (ref 13–17.7)
HOLD SPECIMEN: NORMAL
HOLD SPECIMEN: NORMAL
IMM GRANULOCYTES # BLD AUTO: 0.02 10*3/MM3 (ref 0–0.05)
IMM GRANULOCYTES NFR BLD AUTO: 0.3 % (ref 0–0.5)
INR PPP: 0.93 (ref 0.9–1.1)
LDLC SERPL CALC-MCNC: 51 MG/DL (ref 0–100)
LDLC/HDLC SERPL: 1.23 {RATIO}
LYMPHOCYTES # BLD AUTO: 1.48 10*3/MM3 (ref 0.7–3.1)
LYMPHOCYTES NFR BLD AUTO: 19 % (ref 19.6–45.3)
MCH RBC QN AUTO: 31.7 PG (ref 26.6–33)
MCHC RBC AUTO-ENTMCNC: 33.1 G/DL (ref 31.5–35.7)
MCV RBC AUTO: 95.8 FL (ref 79–97)
MONOCYTES # BLD AUTO: 0.73 10*3/MM3 (ref 0.1–0.9)
MONOCYTES NFR BLD AUTO: 9.4 % (ref 5–12)
NEUTROPHILS NFR BLD AUTO: 5.3 10*3/MM3 (ref 1.7–7)
NEUTROPHILS NFR BLD AUTO: 68.2 % (ref 42.7–76)
NRBC BLD AUTO-RTO: 0 /100 WBC (ref 0–0.2)
NT-PROBNP SERPL-MCNC: 1571 PG/ML (ref 0–1800)
PLATELET # BLD AUTO: 218 10*3/MM3 (ref 140–450)
PMV BLD AUTO: 11.3 FL (ref 6–12)
POTASSIUM SERPL-SCNC: 4.3 MMOL/L (ref 3.5–5.2)
PROT SERPL-MCNC: 7.2 G/DL (ref 6–8.5)
PROTHROMBIN TIME: 13 SECONDS (ref 12.1–14.7)
RBC # BLD AUTO: 4.29 10*6/MM3 (ref 4.14–5.8)
SARS-COV-2 RNA RESP QL NAA+PROBE: NOT DETECTED
SODIUM SERPL-SCNC: 138 MMOL/L (ref 136–145)
TRIGL SERPL-MCNC: 66 MG/DL (ref 0–150)
TROPONIN T DELTA: -3 NG/L
TROPONIN T SERPL HS-MCNC: 23 NG/L
TSH SERPL DL<=0.05 MIU/L-ACNC: 8.14 UIU/ML (ref 0.27–4.2)
VLDLC SERPL-MCNC: 14 MG/DL (ref 5–40)
WBC NRBC COR # BLD: 7.77 10*3/MM3 (ref 3.4–10.8)
WHOLE BLOOD HOLD COAG: NORMAL
WHOLE BLOOD HOLD SPECIMEN: NORMAL

## 2023-06-08 PROCEDURE — 93010 ELECTROCARDIOGRAM REPORT: CPT | Performed by: INTERNAL MEDICINE

## 2023-06-08 PROCEDURE — 93005 ELECTROCARDIOGRAM TRACING: CPT | Performed by: STUDENT IN AN ORGANIZED HEALTH CARE EDUCATION/TRAINING PROGRAM

## 2023-06-08 PROCEDURE — 71045 X-RAY EXAM CHEST 1 VIEW: CPT | Performed by: RADIOLOGY

## 2023-06-08 PROCEDURE — 80061 LIPID PANEL: CPT | Performed by: STUDENT IN AN ORGANIZED HEALTH CARE EDUCATION/TRAINING PROGRAM

## 2023-06-08 PROCEDURE — 85025 COMPLETE CBC W/AUTO DIFF WBC: CPT | Performed by: STUDENT IN AN ORGANIZED HEALTH CARE EDUCATION/TRAINING PROGRAM

## 2023-06-08 PROCEDURE — 83036 HEMOGLOBIN GLYCOSYLATED A1C: CPT | Performed by: STUDENT IN AN ORGANIZED HEALTH CARE EDUCATION/TRAINING PROGRAM

## 2023-06-08 PROCEDURE — 25010000002 HYDRALAZINE PER 20 MG: Performed by: INTERNAL MEDICINE

## 2023-06-08 PROCEDURE — 83605 ASSAY OF LACTIC ACID: CPT | Performed by: STUDENT IN AN ORGANIZED HEALTH CARE EDUCATION/TRAINING PROGRAM

## 2023-06-08 PROCEDURE — 71045 X-RAY EXAM CHEST 1 VIEW: CPT

## 2023-06-08 PROCEDURE — 82550 ASSAY OF CK (CPK): CPT | Performed by: STUDENT IN AN ORGANIZED HEALTH CARE EDUCATION/TRAINING PROGRAM

## 2023-06-08 PROCEDURE — 84443 ASSAY THYROID STIM HORMONE: CPT | Performed by: STUDENT IN AN ORGANIZED HEALTH CARE EDUCATION/TRAINING PROGRAM

## 2023-06-08 PROCEDURE — 99223 1ST HOSP IP/OBS HIGH 75: CPT

## 2023-06-08 PROCEDURE — 83880 ASSAY OF NATRIURETIC PEPTIDE: CPT | Performed by: STUDENT IN AN ORGANIZED HEALTH CARE EDUCATION/TRAINING PROGRAM

## 2023-06-08 PROCEDURE — 87635 SARS-COV-2 COVID-19 AMP PRB: CPT | Performed by: STUDENT IN AN ORGANIZED HEALTH CARE EDUCATION/TRAINING PROGRAM

## 2023-06-08 PROCEDURE — 36415 COLL VENOUS BLD VENIPUNCTURE: CPT

## 2023-06-08 PROCEDURE — 84484 ASSAY OF TROPONIN QUANT: CPT | Performed by: STUDENT IN AN ORGANIZED HEALTH CARE EDUCATION/TRAINING PROGRAM

## 2023-06-08 PROCEDURE — 85610 PROTHROMBIN TIME: CPT | Performed by: STUDENT IN AN ORGANIZED HEALTH CARE EDUCATION/TRAINING PROGRAM

## 2023-06-08 PROCEDURE — 25010000002 HEPARIN (PORCINE) PER 1000 UNITS: Performed by: INTERNAL MEDICINE

## 2023-06-08 PROCEDURE — 84484 ASSAY OF TROPONIN QUANT: CPT | Performed by: INTERNAL MEDICINE

## 2023-06-08 PROCEDURE — 80053 COMPREHEN METABOLIC PANEL: CPT | Performed by: STUDENT IN AN ORGANIZED HEALTH CARE EDUCATION/TRAINING PROGRAM

## 2023-06-08 PROCEDURE — 99284 EMERGENCY DEPT VISIT MOD MDM: CPT

## 2023-06-08 RX ORDER — HEPARIN SODIUM 5000 [USP'U]/ML
5000 INJECTION, SOLUTION INTRAVENOUS; SUBCUTANEOUS EVERY 8 HOURS SCHEDULED
Status: DISCONTINUED | OUTPATIENT
Start: 2023-06-08 | End: 2023-06-10 | Stop reason: HOSPADM

## 2023-06-08 RX ORDER — SODIUM CHLORIDE 0.9 % (FLUSH) 0.9 %
10 SYRINGE (ML) INJECTION EVERY 12 HOURS SCHEDULED
Status: DISCONTINUED | OUTPATIENT
Start: 2023-06-08 | End: 2023-06-10 | Stop reason: HOSPADM

## 2023-06-08 RX ORDER — POLYETHYLENE GLYCOL 3350 17 G/17G
17 POWDER, FOR SOLUTION ORAL DAILY PRN
Status: DISCONTINUED | OUTPATIENT
Start: 2023-06-08 | End: 2023-06-10 | Stop reason: HOSPADM

## 2023-06-08 RX ORDER — BISACODYL 10 MG
10 SUPPOSITORY, RECTAL RECTAL DAILY PRN
Status: DISCONTINUED | OUTPATIENT
Start: 2023-06-08 | End: 2023-06-10 | Stop reason: HOSPADM

## 2023-06-08 RX ORDER — CETIRIZINE HYDROCHLORIDE 10 MG/1
10 TABLET ORAL DAILY
Status: DISCONTINUED | OUTPATIENT
Start: 2023-06-08 | End: 2023-06-10 | Stop reason: HOSPADM

## 2023-06-08 RX ORDER — SODIUM CHLORIDE 0.9 % (FLUSH) 0.9 %
10 SYRINGE (ML) INJECTION AS NEEDED
Status: DISCONTINUED | OUTPATIENT
Start: 2023-06-08 | End: 2023-06-10 | Stop reason: HOSPADM

## 2023-06-08 RX ORDER — BISACODYL 5 MG/1
5 TABLET, DELAYED RELEASE ORAL DAILY PRN
Status: DISCONTINUED | OUTPATIENT
Start: 2023-06-08 | End: 2023-06-10 | Stop reason: HOSPADM

## 2023-06-08 RX ORDER — AMOXICILLIN 250 MG
2 CAPSULE ORAL 2 TIMES DAILY
Status: DISCONTINUED | OUTPATIENT
Start: 2023-06-08 | End: 2023-06-10 | Stop reason: HOSPADM

## 2023-06-08 RX ORDER — HYDRALAZINE HYDROCHLORIDE 20 MG/ML
10 INJECTION INTRAMUSCULAR; INTRAVENOUS EVERY 6 HOURS PRN
Status: DISCONTINUED | OUTPATIENT
Start: 2023-06-08 | End: 2023-06-10 | Stop reason: HOSPADM

## 2023-06-08 RX ORDER — ATORVASTATIN CALCIUM 40 MG/1
40 TABLET, FILM COATED ORAL DAILY
Status: DISCONTINUED | OUTPATIENT
Start: 2023-06-08 | End: 2023-06-10 | Stop reason: HOSPADM

## 2023-06-08 RX ORDER — DONEPEZIL HYDROCHLORIDE 5 MG/1
10 TABLET, FILM COATED ORAL NIGHTLY
Status: DISCONTINUED | OUTPATIENT
Start: 2023-06-08 | End: 2023-06-09

## 2023-06-08 RX ORDER — SODIUM CHLORIDE 9 MG/ML
40 INJECTION, SOLUTION INTRAVENOUS AS NEEDED
Status: DISCONTINUED | OUTPATIENT
Start: 2023-06-08 | End: 2023-06-10 | Stop reason: HOSPADM

## 2023-06-08 RX ADMIN — HEPARIN SODIUM 5000 UNITS: 5000 INJECTION INTRAVENOUS; SUBCUTANEOUS at 22:09

## 2023-06-08 RX ADMIN — CETIRIZINE HYDROCHLORIDE 10 MG: 10 TABLET, FILM COATED ORAL at 20:48

## 2023-06-08 RX ADMIN — DOCUSATE SODIUM 50 MG AND SENNOSIDES 8.6 MG 2 TABLET: 8.6; 5 TABLET, FILM COATED ORAL at 20:49

## 2023-06-08 RX ADMIN — DONEPEZIL HYDROCHLORIDE 10 MG: 5 TABLET, FILM COATED ORAL at 20:48

## 2023-06-08 RX ADMIN — ATORVASTATIN CALCIUM 40 MG: 40 TABLET, FILM COATED ORAL at 20:48

## 2023-06-08 RX ADMIN — HYDRALAZINE HYDROCHLORIDE 10 MG: 20 INJECTION INTRAMUSCULAR; INTRAVENOUS at 20:49

## 2023-06-08 RX ADMIN — Medication 10 ML: at 20:49

## 2023-06-08 NOTE — H&P
[facility] Medicine Services  History & Physical    Patient Identification:  Name:  Clinton Ramirez  Age:  88 y.o.  Sex:  male  :  1935  MRN:  [id number]   Visit Number:  [id number]  Admit Date: 2023   Primary Care Physician:  Ewelina Chowdary DO    Subjective     Chief complaint: Bradycardia, shortness of breath    History of presenting illness:      Clinton Ramirez is a 88 y.o. male with past medical history significant for HTN, HLD, aortic stenosis, CAD.  Per review of ED provider note patient with known history of bradycardia had discussed PPM placement with primary cardiologist though were monitoring closely for now.    Upon arrival to the ED, vital signs were temp 98.1, heart rate 34, respirations 18, /75, SPO2 96% on room air.  Initial HS troponin slightly elevated at 23.  Creatinine elevated at 1.56 from baseline 1-1.2.  TSH elevated at 8.140.  CXR notes mild central pulmonary vascular congestion.  EKG notes sinus bradycardia with right bundle branch block.  Rate 34.    On exam patient is pleasant and cooperative in no acute distress. HR remained upper 30s- low 40s while I was in the room. He reports about a month of fatigue and shortness of breath with daily activities that seems to be progressively worsening. He denies any chest pain or palpitations. Patient's wife is at the bedside helping provide history. She states she has been monitoring his heart rate and this AM noted it would not read with their home BP cuff so she checked it manually and reports it was 20 bpm which prompted them to seek care in the ED. Patients wife states patient's cardiologist considered PPM implantation a year ago but opted to continue to monitor. Patient also reports poor appetite over the past month as well, states he just hasn't felt like eating. Does also mention he has had some difficulty with ambulation secondary to lateral left leg pain which he states started about  the same time he noticed a nodule on his spine in his low back. He denies much change in size since first noticed. On exam he does have a firm nodule on lumbar spine that is mildy tender to palpation, non mobile. Further review of systems notes legs swelling which is chronic and unchanged, otherwise negative see below.     Known Emergency Department medications received prior to my evaluation included none.   Emergency Department Room location at the time of my evaluation was [facility].     ---------------------------------------------------------------------------------------------------------------------   Review of Systems   Constitutional:  Positive for fatigue. Negative for chills and fever.   HENT:  Negative for congestion, rhinorrhea and sore throat.    Respiratory:  Positive for shortness of breath. Negative for cough.    Cardiovascular:  Positive for leg swelling. Negative for chest pain and palpitations.   Gastrointestinal:  Negative for abdominal pain, constipation, diarrhea, nausea and vomiting.   Genitourinary:  Negative for decreased urine volume, difficulty urinating and dysuria.   Musculoskeletal:  Positive for arthralgias and myalgias.   Skin:  Negative for rash and wound.   Neurological:  Positive for weakness. Negative for syncope.      ---------------------------------------------------------------------------------------------------------------------   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    Cervical radiculopathy 5/11/2016    Elevated cholesterol     Hypertension     Hypokalemia     Multiple benign polyps of large intestine     Need for prophylactic vaccination and inoculation against influenza     Pancreatitis     Paralytic ileus     PPD positive     negative chest xray     Renal insufficiency     mild-told to stop Naproxen    Rheumatic fever     age 20 while in -hospitalized     Skin cancer     basal cell from ears, melanoma  from neck area     Valvular disease     Wears glasses     Wears partial dentures     upper      Past Surgical History:   Procedure Laterality Date    BIOPSY PROSTATE NEEDLE / PUNCH / INCISIONAL      CARDIAC CATHETERIZATION N/A 2018    Procedure: Left Heart Cath;  Surgeon: Claire Scott MD;  Location:  [facility]VASIVE [facility];  Service: Cardiovascular    COLONOSCOPY      EAR BIOPSY      basal cell skin cancer    SKIN CANCER EXCISION      melanoma from neck     THROAT SURGERY      Biopsy     Family History   Problem Relation Age of Onset    Pancreatic cancer Sister     Tuberculosis Other     Diabetes Other     Heart disease Father     Lung cancer Brother     Leukemia Sister      Social History     Socioeconomic History    Marital status:    Tobacco Use    Smoking status: Former     Packs/day: 3.00     Years: 11.00     Pack years: 33.00     Types: Cigarettes     Quit date:      Years since quittin.4    Smokeless tobacco: Never   Vaping Use    Vaping Use: Never used   Substance and Sexual Activity    Alcohol use: Not Currently     Comment: up to 3 beers per day, not everyday    Drug use: No    Sexual activity: Defer     Partners: Female     Birth control/protection: None     ---------------------------------------------------------------------------------------------------------------------   Allergies:  Patient has no known allergies.  ---------------------------------------------------------------------------------------------------------------------   Home medications:    Medications below are reported home medications pulling from within the system; at this time, these medications have not been reconciled unless otherwise specified and are in the verification process for further verifcation as current home medications.  (Not in a hospital admission)      Hospital Scheduled Meds:  atropine sulfate, , ,            Current listed hospital scheduled medications may not yet reflect those  currently placed in orders that are signed and held awaiting patient's arrival to floor.   ---------------------------------------------------------------------------------------------------------------------     Objective     Vital Signs:  Temp:  [98.1 °F (36.7 °C)] 98.1 °F (36.7 °C)  Heart Rate:  [34] 34  Resp:  [18] 18  BP: (182)/(75) 182/75      06/08/23  1542   Weight: 80.3 kg (177 lb)     Body mass index is 26.14 kg/m².  ---------------------------------------------------------------------------------------------------------------------       Physical Exam  Vitals and nursing note reviewed.   Constitutional:       General: He is not in acute distress.  HENT:      Head: Normocephalic and atraumatic.   Eyes:      Extraocular Movements: Extraocular movements intact.      Conjunctiva/sclera: Conjunctivae normal.   Cardiovascular:      Rate and Rhythm: Regular rhythm. Bradycardia present.      Heart sounds: Murmur heard.   Pulmonary:      Effort: Pulmonary effort is normal.      Breath sounds: Normal breath sounds.   Abdominal:      Palpations: Abdomen is soft.      Tenderness: There is no abdominal tenderness.   Musculoskeletal:      Right lower leg: Edema present.      Left lower leg: Edema present.      Comments: Trace bilateral LE pitting    Firm 1-2cm nodule lower spine   Skin:     General: Skin is warm and dry.   Neurological:      Mental Status: He is alert. Mental status is at baseline.   Psychiatric:         Mood and Affect: Mood normal.         Behavior: Behavior normal.             ---------------------------------------------------------------------------------------------------------------------  EKG:        I have personally looked at the EKG.  ---------------------------------------------------------------------------------------------------------------------   Results from last 7 days   Lab Units 06/08/23  1629 06/08/23  1548   LACTATE mmol/L 1.4  --    WBC 10*3/mm3  --  7.77   HEMOGLOBIN g/dL  --   13.6   HEMATOCRIT %  --  41.1   MCV fL  --  95.8   MCHC g/dL  --  33.1   PLATELETS 10*3/mm3  --  218   INR   --  0.93         Results from last 7 days   Lab Units 06/08/23  1548   SODIUM mmol/L 138   POTASSIUM mmol/L 4.3   CHLORIDE mmol/L 103   CO2 mmol/L 26.6   BUN mg/dL 17   CREATININE mg/dL 1.56*   CALCIUM mg/dL 9.0   GLUCOSE mg/dL 139*   ALBUMIN g/dL 3.8   BILIRUBIN mg/dL 0.5   ALK PHOS U/L 111   AST (SGOT) U/L 34   ALT (SGPT) U/L 19   Estimated Creatinine Clearance: 37.2 mL/min (A) (by C-G formula based on SCr of 1.56 mg/dL (H)).  No results found for: AMMONIA  Results from last 7 days   Lab Units 06/08/23  1548   CK TOTAL U/L 75   HSTROP T ng/L 23*     Results from last 7 days   Lab Units 06/08/23  1548   PROBNP pg/mL 1,571.0     Lab Results   Component Value Date    HGBA1C 5.70 (H) 06/08/2023     Lab Results   Component Value Date    TSH 8.140 (H) 06/08/2023    FREET4 1.27 03/30/2023     No results found for: PREGTESTUR, PREGSERUM, HCG, HCGQUANT  Pain Management Panel           No data to display              No results found for: BLOODCX  No results found for: URINECX  No results found for: WOUNDCX  No results found for: STOOLCX  Results from last 7 days   Lab Units 06/08/23  1548   CHOLESTEROL mg/dL 107   TRIGLYCERIDES mg/dL 66   HDL CHOL mg/dL 42   LDL CHOL mg/dL 51     ---------------------------------------------------------------------------------------------------------------------  Imaging Results (Last 7 Days)       Procedure Component Value Units Date/Time    XR Chest 1 View [[id number]] Collected: 06/08/23 1702     Updated: 06/08/23 1706    Narrative:      Procedure: Portable chest x-ray examination performed on 06/08/2023.  Semierect position. Single view.     HISTORY: Chest pain.     FINDINGS:     Normal heart size.  No lobar consolidation or edema.  Mild central pulmonary vascular congestion.  Minimal atelectasis at the lung bases.  No pleural effusion or pneumothorax.  Mild hypoinflated  lungs.  Mild degenerative disc disease throughout the thoracic spine.  Mild osteoarthritis at the glenohumeral joints.       Impression:         1.  Mild central pulmonary vascular congestion.  2.  No edema or pleural effusion.  3.  Mild hypoinflated lungs.  4.  Normal heart size.     This report was finalized on 6/8/2023 5:04 PM by Carlos Cloud MD.               Cultures:  No results found for: BLOODCX, URINECX, WOUNDCX, MRSACX, RESPCX, STOOLCX    Last echocardiogram:  Results for orders placed during the hospital encounter of 02/28/20    Adult Transthoracic Echo Complete W/ Cont if Necessary Per Protocol    Interpretation Summary  · Normal left ventricular cavity size and wall thickness noted.  · Left ventricular systolic function is normal. Estimated EF appears to be in the range of 56 - 60%.  · Left ventricular diastolic dysfunction (grade I) consistent with impaired relaxation.  · There is moderate calcification of the aortic valve with mild aortic valve stenosis, CORIN(I,D) 1.8 cm^2.  · Moderate MAC is present. There is bileaflet mitral valve thickening present. No mitral valve regurgitation is present. No significant mitral valve stenosis is present.  · Mild tricuspid valve regurgitation is present with moderate pulmonary hypertension is present (50 mm Hg).  · Left atrial cavity size is mildly dilated.  · There is no evidence of pericardial effusion.          I have personally reviewed the above radiology images and read the final radiology report on 06/08/23  ---------------------------------------------------------------------------------------------------------------------  Assessment / Plan     Active Hospital Problems    Diagnosis  POA    **Symptomatic bradycardia [R00.1]  Yes       ASSESSMENT/PLAN:    Symptomatic bradycardia, POA  BOBBY, POA  NSTEMI T1 versus T2, POA  Patient presents after his wife noted his heart rate to be in the 30s this AM.  Reports his had dyspnea on exertion with easy fatigability  today.  Patient has known history of bradycardia and per his report primary cardiologist following closely and had discussed pacemaker implantation in the past.  Did present with elevated creatinine at 1.56 from baseline 1-1.2.Does admit to poor PO intake on exam.  Patient will be admitted to the PCU with continuous cardiac monitoring/pulse oximetry for further work-up and management.  Consult cardiology for further assistance, appreciated.  N.p.o. at midnight pending further work-up/intervention.  We will obtain updated TTE.  Repeat labs in the a.m.  Trend troponin  Supportive care    Chronic:  HTN  HLD  Aortic stenosis  CAD  Restart home regimen as indicated per med red  Hold any potentially nephrotoxic agents    ----------  -DVT prophylaxis: subcu heparin  -Activity: Ad cathy.  -Expected length of stay: INPATIENT status due to the need for care which can only be reasonably provided in an hospital setting such as aggressive/expedited ancillary services and/or consultation services, the necessity for IV medications, close physician monitoring and/or the possible need for procedures.  In such, I feel patient’s risk for adverse outcomes and need for care warrant INPATIENT evaluation and predict the patient’s care encounter to likely last beyond 2 midnights.   -Disposition pending course and further work up     High risk secondary to symptomatic bradycardia    Code Status and Medical Interventions:   Ordered at: 06/08/23 1716     Code Status (Patient has no pulse and is not breathing):    CPR (Attempt to Resuscitate)     Medical Interventions (Patient has pulse or is breathing):    Full Support       Ulisses Soto PA-C   06/08/23  17:17 EDT

## 2023-06-08 NOTE — ED PROVIDER NOTES
Subjective   History of Present Illness  Patient is an 88-year-old male with history significant for hypertension, mild aortic valve stenosis and hyperlipidemia who comes to the ER with complaints of fatigue and shortness of breath on exertion.  His wife is an old nurse, she checked his vitals this morning and noticed his heart rate was in the 30s.  He denies any chest pain/pressure or tightness but notes feeling short of breath on exertion and easy fatigability.  He says he follows with Dr. Chon Henson, cardiology at [facility] and a pacemaker had been discussed in the past but at last appointment, they noted they were going to monitor it for now.  At rest he is asymptomatic, initial /75 with a heart rate of 34,  96% on room air. He denies any fevers, chills, productive sputum.  He is not on anticoagulation.  He does not take beta-blockers.  Antihypertensive regimen includes felodipine and losartan.    [facility] 2018  Angiographic Findings:  Bilateral coronary dominance.  · LM: Angiographically normal.  · LAD: Angiographically normal.  · LCX: Codominant vessel with a focal 50% midsegment stenosis with normal IFR of 0.96.  · RCA: Medium-sized codominant/nondominant vessel giving rise to a small right PDA.  There is mild diffuse distal plaque without hemodynamically significant focal disease.  · LV: Left ventriculogram performed in 30 AUGUST projection revealed normal global and regional left ventricular systolic function with estimated ejection fraction of 65%.  No mitral regurgitation was noted.    ECHO 2020  · Normal left ventricular cavity size and wall thickness noted.  · Left ventricular systolic function is normal. Estimated EF appears to be in the range of 56 - 60%.  · Left ventricular diastolic dysfunction (grade I) consistent with impaired relaxation.  · There is moderate calcification of the aortic valve with mild aortic valve stenosis, CORIN(I,D) 1.8 cm^2.  · Moderate MAC is present. There is bileaflet mitral  valve thickening present. No mitral valve regurgitation is present. No significant mitral valve stenosis is present.  · Mild tricuspid valve regurgitation is present with moderate pulmonary hypertension is present (50 mm Hg).  · Left atrial cavity size is mildly dilated.  · There is no evidence of pericardial effusion.      Review of Systems   Constitutional:  Positive for fatigue. Negative for chills and fever.   HENT:  Negative for congestion, sinus pain and sore throat.    Respiratory:  Positive for shortness of breath. Negative for cough, chest tightness and wheezing.    Cardiovascular:  Positive for leg swelling. Negative for chest pain and palpitations.   Gastrointestinal:  Negative for abdominal pain, constipation, diarrhea, nausea and vomiting.   Genitourinary:  Negative for dysuria, frequency, hematuria and urgency.   Musculoskeletal:  Negative for arthralgias and myalgias.   Neurological:  Negative for dizziness, numbness and headaches.   Psychiatric/Behavioral:  Negative for confusion.      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    Cervical radiculopathy 5/11/2016    Elevated cholesterol     Hypertension     Hypokalemia     Multiple benign polyps of large intestine     Need for prophylactic vaccination and inoculation against influenza     Pancreatitis     Paralytic ileus     PPD positive     negative chest xray     Renal insufficiency     mild-told to stop Naproxen    Rheumatic fever     age 20 while in -hospitalized     Skin cancer     basal cell from ears, melanoma from neck area     Valvular disease     Wears glasses     Wears partial dentures     upper        No Known Allergies    Past Surgical History:   Procedure Laterality Date    BIOPSY PROSTATE NEEDLE / PUNCH / INCISIONAL      CARDIAC CATHETERIZATION N/A 7/11/2018    Procedure: Left Heart Cath;  Surgeon: Claire Scott MD;  Location: [facility] CATH INVASIVE LOCATION;  Service:  Cardiovascular    COLONOSCOPY      EAR BIOPSY      basal cell skin cancer    SKIN CANCER EXCISION      melanoma from neck     THROAT SURGERY      Biopsy       Family History   Problem Relation Age of Onset    Pancreatic cancer Sister     Tuberculosis Other     Diabetes Other     Heart disease Father     Lung cancer Brother     Leukemia Sister        Social History     Socioeconomic History    Marital status:    Tobacco Use    Smoking status: Former     Packs/day: 3.00     Years: 11.00     Pack years: 33.00     Types: Cigarettes     Quit date:      Years since quittin.4    Smokeless tobacco: Never   Vaping Use    Vaping Use: Never used   Substance and Sexual Activity    Alcohol use: Not Currently     Comment: up to 3 beers per day, not everyday    Drug use: No    Sexual activity: Defer     Partners: Female     Birth control/protection: None           Objective   Physical Exam  Vitals and nursing note reviewed.   Constitutional:       General: He is not in acute distress.     Appearance: He is well-developed and normal weight. He is not ill-appearing.   HENT:      Head: Normocephalic.      Mouth/Throat:      Mouth: Mucous membranes are moist.      Pharynx: Oropharynx is clear.   Eyes:      Extraocular Movements: Extraocular movements intact.      Pupils: Pupils are equal, round, and reactive to light.   Neck:      Vascular: No JVD.   Cardiovascular:      Rate and Rhythm: Regular rhythm. Bradycardia present.      Heart sounds: No murmur heard.    No friction rub. No gallop.   Pulmonary:      Effort: Pulmonary effort is normal. No tachypnea.      Breath sounds: Normal breath sounds. No decreased breath sounds, wheezing, rhonchi or rales.   Abdominal:      General: Bowel sounds are normal.      Palpations: Abdomen is soft.   Musculoskeletal:         General: Normal range of motion.      Cervical back: Normal range of motion and neck supple.      Right lower leg: Edema present.      Left lower leg: Edema  present.   Skin:     General: Skin is warm and dry.      Capillary Refill: Capillary refill takes less than 2 seconds.   Neurological:      General: No focal deficit present.      Mental Status: He is alert and oriented to person, place, and time.   Psychiatric:         Mood and Affect: Mood normal.         Behavior: Behavior normal.       Procedures           ED Course  ED Course as of 06/08/23 1713   Thu Jun 08, 2023   1538 ECG 12 Lead  Sinus bradycardia, rate 34, QTc 475, no acute ST or T wave changes [CW]      ED Course User Index  [CW] Geovanni Navarro DO                                           Medical Decision Making  --On arrival, patient was ambulatory and alert and oriented x3.  Initial EKG noted sinus bradycardia with heart rate 34 without ischemic changes.  Hemodynamically he was stable.  While at rest he is asymptomatic.  --EKG from February 2023 noted bradycardia with heart rate 58  --While on telemetry in the ER, heart rate ranged from 31-50  --Discussed with Dr. Porter, agreed to consult and eval for PPM  --d/w medicine, admit for PPM eval    Problems Addressed:  Bradycardia: complicated acute illness or injury  Nonrheumatic aortic valve stenosis: complicated acute illness or injury    Amount and/or Complexity of Data Reviewed  Labs: ordered.  Radiology: ordered.  ECG/medicine tests: ordered. Decision-making details documented in ED Course.    Risk  Prescription drug management.  Decision regarding hospitalization.        Final diagnoses:   Bradycardia   Nonrheumatic aortic valve stenosis       ED Disposition  ED Disposition       ED Disposition   Decision to Admit    Condition   --    Comment   --               No follow-up provider specified.       Medication List      No changes were made to your prescriptions during this visit.            Geovanni Navarro DO  06/08/23 [id number]

## 2023-06-08 NOTE — PLAN OF CARE
Problem: Adult Inpatient Plan of Care  Goal: Plan of Care Review  Outcome: Ongoing, Progressing  Goal: Patient-Specific Goal (Individualized)  Outcome: Ongoing, Progressing  Goal: Absence of Hospital-Acquired Illness or Injury  Outcome: Ongoing, Progressing  Intervention: Identify and Manage Fall Risk  Recent Flowsheet Documentation  Taken 6/8/2023 1807 by Ruthann Cali RN  Safety Promotion/Fall Prevention:   activity supervised   assistive device/personal items within reach   clutter free environment maintained   fall prevention program maintained   nonskid shoes/slippers when out of bed   room organization consistent   safety round/check completed  Intervention: Prevent and Manage VTE (Venous Thromboembolism) Risk  Recent Flowsheet Documentation  Taken 6/8/2023 1807 by Ruthann Cali RN  Activity Management: activity encouraged  Intervention: Prevent Infection  Recent Flowsheet Documentation  Taken 6/8/2023 1807 by Ruthann Cali RN  Infection Prevention:   rest/sleep promoted   single patient room provided  Goal: Optimal Comfort and Wellbeing  Outcome: Ongoing, Progressing  Intervention: Provide Person-Centered Care  Recent Flowsheet Documentation  Taken 6/8/2023 1807 by Ruthann Cali RN  Trust Relationship/Rapport:   care explained   choices provided   emotional support provided   empathic listening provided   questions encouraged   questions answered   thoughts/feelings acknowledged   reassurance provided  Goal: Readiness for Transition of Care  Outcome: Ongoing, Progressing     Problem: Fall Injury Risk  Goal: Absence of Fall and Fall-Related Injury  Outcome: Ongoing, Progressing  Intervention: Identify and Manage Contributors  Recent Flowsheet Documentation  Taken 6/8/2023 1807 by Ruthann Cali RN  Medication Review/Management: medications reviewed  Intervention: Promote Injury-Free Environment  Recent Flowsheet Documentation  Taken 6/8/2023 1807 by Ruthann Cali RN  Safety Promotion/Fall  Prevention:   activity supervised   assistive device/personal items within reach   clutter free environment maintained   fall prevention program maintained   nonskid shoes/slippers when out of bed   room organization consistent   safety round/check completed     Problem: Skin Injury Risk Increased  Goal: Skin Health and Integrity  Outcome: Ongoing, Progressing     Problem: Hypertension Comorbidity  Goal: Blood Pressure in Desired Range  Outcome: Ongoing, Progressing  Intervention: Maintain Blood Pressure Management  Recent Flowsheet Documentation  Taken 6/8/2023 1807 by Ruthann Cali RN  Medication Review/Management: medications reviewed   Goal Outcome Evaluation:      Pt welcomed to PCU. Alert and oriented x 4. On room air. Pt's wife at bedside. No complaints at this time. Bed is locked in low position with call light in reach.

## 2023-06-09 ENCOUNTER — APPOINTMENT (OUTPATIENT)
Dept: CARDIOLOGY | Facility: HOSPITAL | Age: 88
DRG: 243 | End: 2023-06-09
Payer: MEDICARE

## 2023-06-09 ENCOUNTER — APPOINTMENT (OUTPATIENT)
Dept: GENERAL RADIOLOGY | Facility: HOSPITAL | Age: 88
DRG: 243 | End: 2023-06-09
Payer: MEDICARE

## 2023-06-09 PROBLEM — I49.5 SSS (SICK SINUS SYNDROME): Status: ACTIVE | Noted: 2023-06-08

## 2023-06-09 LAB
ANION GAP SERPL CALCULATED.3IONS-SCNC: 11.1 MMOL/L (ref 5–15)
ANION GAP SERPL CALCULATED.3IONS-SCNC: 13.4 MMOL/L (ref 5–15)
BH CV ECHO MEAS - ACS: 1 CM
BH CV ECHO MEAS - AO MAX PG: 36.7 MMHG
BH CV ECHO MEAS - AO MEAN PG: 20 MMHG
BH CV ECHO MEAS - AO ROOT DIAM: 2.8 CM
BH CV ECHO MEAS - AO V2 MAX: 303 CM/SEC
BH CV ECHO MEAS - AO V2 VTI: 70.8 CM
BH CV ECHO MEAS - AVA(I,D): 2.01 CM2
BH CV ECHO MEAS - EDV(CUBED): 87.5 ML
BH CV ECHO MEAS - EDV(MOD-SP4): 112 ML
BH CV ECHO MEAS - EF(MOD-BP): 64 %
BH CV ECHO MEAS - EF(MOD-SP4): 64.2 %
BH CV ECHO MEAS - ESV(CUBED): 29.5 ML
BH CV ECHO MEAS - ESV(MOD-SP4): 40.1 ML
BH CV ECHO MEAS - FS: 30.4 %
BH CV ECHO MEAS - IVS/LVPW: 0.92 CM
BH CV ECHO MEAS - IVSD: 1.03 CM
BH CV ECHO MEAS - LA DIMENSION: 3.9 CM
BH CV ECHO MEAS - LAT PEAK E' VEL: 10.9 CM/SEC
BH CV ECHO MEAS - LV DIASTOLIC VOL/BSA (35-75): 57.4 CM2
BH CV ECHO MEAS - LV MASS(C)D: 165.9 GRAMS
BH CV ECHO MEAS - LV MAX PG: 9.2 MMHG
BH CV ECHO MEAS - LV MEAN PG: 5 MMHG
BH CV ECHO MEAS - LV SYSTOLIC VOL/BSA (12-30): 20.5 CM2
BH CV ECHO MEAS - LV V1 MAX: 152 CM/SEC
BH CV ECHO MEAS - LV V1 VTI: 37.4 CM
BH CV ECHO MEAS - LVIDD: 4.4 CM
BH CV ECHO MEAS - LVIDS: 3.1 CM
BH CV ECHO MEAS - LVOT AREA: 3.8 CM2
BH CV ECHO MEAS - LVOT DIAM: 2.2 CM
BH CV ECHO MEAS - LVPWD: 1.12 CM
BH CV ECHO MEAS - MED PEAK E' VEL: 12.8 CM/SEC
BH CV ECHO MEAS - MV A MAX VEL: 106 CM/SEC
BH CV ECHO MEAS - MV E MAX VEL: 138 CM/SEC
BH CV ECHO MEAS - MV E/A: 1.3
BH CV ECHO MEAS - MV MAX PG: 24.6 MMHG
BH CV ECHO MEAS - MV MEAN PG: 4 MMHG
BH CV ECHO MEAS - MV V2 VTI: 92.5 CM
BH CV ECHO MEAS - MVA(VTI): 1.54 CM2
BH CV ECHO MEAS - PA ACC TIME: 0.12 SEC
BH CV ECHO MEAS - RAP SYSTOLE: 10 MMHG
BH CV ECHO MEAS - RVSP: 49.9 MMHG
BH CV ECHO MEAS - SI(MOD-SP4): 36.8 ML/M2
BH CV ECHO MEAS - SV(LVOT): 142.2 ML
BH CV ECHO MEAS - SV(MOD-SP4): 71.9 ML
BH CV ECHO MEAS - TR MAX PG: 39.9 MMHG
BH CV ECHO MEAS - TR MAX VEL: 316 CM/SEC
BH CV ECHO MEASUREMENTS AVERAGE E/E' RATIO: 11.65
BUN SERPL-MCNC: 13 MG/DL (ref 8–23)
BUN SERPL-MCNC: 15 MG/DL (ref 8–23)
BUN/CREAT SERPL: 10.4 (ref 7–25)
BUN/CREAT SERPL: 11.1 (ref 7–25)
CALCIUM SPEC-SCNC: 9 MG/DL (ref 8.6–10.5)
CALCIUM SPEC-SCNC: 9.3 MG/DL (ref 8.6–10.5)
CHLORIDE SERPL-SCNC: 107 MMOL/L (ref 98–107)
CHLORIDE SERPL-SCNC: 108 MMOL/L (ref 98–107)
CO2 SERPL-SCNC: 19.6 MMOL/L (ref 22–29)
CO2 SERPL-SCNC: 21.9 MMOL/L (ref 22–29)
CREAT SERPL-MCNC: 1.25 MG/DL (ref 0.76–1.27)
CREAT SERPL-MCNC: 1.35 MG/DL (ref 0.76–1.27)
DEPRECATED RDW RBC AUTO: 42.8 FL (ref 37–54)
DEPRECATED RDW RBC AUTO: 43.8 FL (ref 37–54)
EGFRCR SERPLBLD CKD-EPI 2021: 50.5 ML/MIN/1.73
EGFRCR SERPLBLD CKD-EPI 2021: 55.4 ML/MIN/1.73
ERYTHROCYTE [DISTWIDTH] IN BLOOD BY AUTOMATED COUNT: 12 % (ref 12.3–15.4)
ERYTHROCYTE [DISTWIDTH] IN BLOOD BY AUTOMATED COUNT: 12.2 % (ref 12.3–15.4)
GLUCOSE SERPL-MCNC: 107 MG/DL (ref 65–99)
GLUCOSE SERPL-MCNC: 119 MG/DL (ref 65–99)
HCT VFR BLD AUTO: 42.2 % (ref 37.5–51)
HCT VFR BLD AUTO: 47 % (ref 37.5–51)
HGB BLD-MCNC: 13.5 G/DL (ref 13–17.7)
HGB BLD-MCNC: 15.2 G/DL (ref 13–17.7)
LEFT ATRIUM VOLUME INDEX: 23.2 ML/M2
MAXIMAL PREDICTED HEART RATE: 132 BPM
MCH RBC QN AUTO: 31 PG (ref 26.6–33)
MCH RBC QN AUTO: 31.6 PG (ref 26.6–33)
MCHC RBC AUTO-ENTMCNC: 32 G/DL (ref 31.5–35.7)
MCHC RBC AUTO-ENTMCNC: 32.3 G/DL (ref 31.5–35.7)
MCV RBC AUTO: 96.8 FL (ref 79–97)
MCV RBC AUTO: 97.7 FL (ref 79–97)
PLATELET # BLD AUTO: 142 10*3/MM3 (ref 140–450)
PLATELET # BLD AUTO: 202 10*3/MM3 (ref 140–450)
PMV BLD AUTO: 11.1 FL (ref 6–12)
PMV BLD AUTO: 12.6 FL (ref 6–12)
POTASSIUM SERPL-SCNC: 4.1 MMOL/L (ref 3.5–5.2)
POTASSIUM SERPL-SCNC: 4.5 MMOL/L (ref 3.5–5.2)
QT INTERVAL: 632 MS
QT INTERVAL: 678 MS
QTC INTERVAL: 475 MS
QTC INTERVAL: 517 MS
RBC # BLD AUTO: 4.36 10*6/MM3 (ref 4.14–5.8)
RBC # BLD AUTO: 4.81 10*6/MM3 (ref 4.14–5.8)
SODIUM SERPL-SCNC: 140 MMOL/L (ref 136–145)
SODIUM SERPL-SCNC: 141 MMOL/L (ref 136–145)
STRESS TARGET HR: 112 BPM
WBC NRBC COR # BLD: 9.06 10*3/MM3 (ref 3.4–10.8)
WBC NRBC COR # BLD: 9.38 10*3/MM3 (ref 3.4–10.8)

## 2023-06-09 PROCEDURE — 33208 INSRT HEART PM ATRIAL & VENT: CPT | Performed by: INTERNAL MEDICINE

## 2023-06-09 PROCEDURE — 93005 ELECTROCARDIOGRAM TRACING: CPT | Performed by: INTERNAL MEDICINE

## 2023-06-09 PROCEDURE — 25510000001 IOPAMIDOL 61 % SOLUTION: Performed by: INTERNAL MEDICINE

## 2023-06-09 PROCEDURE — 93306 TTE W/DOPPLER COMPLETE: CPT | Performed by: INTERNAL MEDICINE

## 2023-06-09 PROCEDURE — 80048 BASIC METABOLIC PNL TOTAL CA: CPT | Performed by: INTERNAL MEDICINE

## 2023-06-09 PROCEDURE — 25010000002 VANCOMYCIN 5 G RECONSTITUTED SOLUTION 5,000 MG VIAL: Performed by: INTERNAL MEDICINE

## 2023-06-09 PROCEDURE — 93306 TTE W/DOPPLER COMPLETE: CPT

## 2023-06-09 PROCEDURE — 25010000002 HYDRALAZINE PER 20 MG: Performed by: INTERNAL MEDICINE

## 2023-06-09 PROCEDURE — L3660 SO 8 AB RSTR CAN/WEB PRE OTS: HCPCS | Performed by: INTERNAL MEDICINE

## 2023-06-09 PROCEDURE — 25010000002 HEPARIN (PORCINE) PER 1000 UNITS: Performed by: INTERNAL MEDICINE

## 2023-06-09 PROCEDURE — 25010000002 VANCOMYCIN 1 G RECONSTITUTED SOLUTION 1 EACH VIAL: Performed by: INTERNAL MEDICINE

## 2023-06-09 PROCEDURE — 02H63JZ INSERTION OF PACEMAKER LEAD INTO RIGHT ATRIUM, PERCUTANEOUS APPROACH: ICD-10-PCS | Performed by: INTERNAL MEDICINE

## 2023-06-09 PROCEDURE — 85027 COMPLETE CBC AUTOMATED: CPT | Performed by: INTERNAL MEDICINE

## 2023-06-09 PROCEDURE — C1898 LEAD, PMKR, OTHER THAN TRANS: HCPCS | Performed by: INTERNAL MEDICINE

## 2023-06-09 PROCEDURE — C1785 PMKR, DUAL, RATE-RESP: HCPCS | Performed by: INTERNAL MEDICINE

## 2023-06-09 PROCEDURE — 71045 X-RAY EXAM CHEST 1 VIEW: CPT | Performed by: RADIOLOGY

## 2023-06-09 PROCEDURE — 02HK3JZ INSERTION OF PACEMAKER LEAD INTO RIGHT VENTRICLE, PERCUTANEOUS APPROACH: ICD-10-PCS | Performed by: INTERNAL MEDICINE

## 2023-06-09 PROCEDURE — 99221 1ST HOSP IP/OBS SF/LOW 40: CPT | Performed by: INTERNAL MEDICINE

## 2023-06-09 PROCEDURE — C1894 INTRO/SHEATH, NON-LASER: HCPCS | Performed by: INTERNAL MEDICINE

## 2023-06-09 PROCEDURE — 99233 SBSQ HOSP IP/OBS HIGH 50: CPT | Performed by: INTERNAL MEDICINE

## 2023-06-09 PROCEDURE — 93010 ELECTROCARDIOGRAM REPORT: CPT | Performed by: INTERNAL MEDICINE

## 2023-06-09 PROCEDURE — 71045 X-RAY EXAM CHEST 1 VIEW: CPT

## 2023-06-09 PROCEDURE — 0JH606Z INSERTION OF PACEMAKER, DUAL CHAMBER INTO CHEST SUBCUTANEOUS TISSUE AND FASCIA, OPEN APPROACH: ICD-10-PCS | Performed by: INTERNAL MEDICINE

## 2023-06-09 DEVICE — LD PM TENDRIL STS 6F58CM 2088TC58: Type: IMPLANTABLE DEVICE | Status: FUNCTIONAL

## 2023-06-09 DEVICE — LD PM TENDRIL STS 6F52CM 2088TC52: Type: IMPLANTABLE DEVICE | Status: FUNCTIONAL

## 2023-06-09 DEVICE — GEN PM ASSURITY MRI DR RF PM2272: Type: IMPLANTABLE DEVICE | Status: FUNCTIONAL

## 2023-06-09 RX ORDER — SODIUM CHLORIDE 0.9 % (FLUSH) 0.9 %
10 SYRINGE (ML) INJECTION AS NEEDED
Status: DISCONTINUED | OUTPATIENT
Start: 2023-06-09 | End: 2023-06-10 | Stop reason: HOSPADM

## 2023-06-09 RX ORDER — ACETAMINOPHEN 650 MG/1
650 SUPPOSITORY RECTAL EVERY 4 HOURS PRN
Status: DISCONTINUED | OUTPATIENT
Start: 2023-06-09 | End: 2023-06-10 | Stop reason: HOSPADM

## 2023-06-09 RX ORDER — ACETAMINOPHEN 325 MG/1
650 TABLET ORAL EVERY 4 HOURS PRN
Status: DISCONTINUED | OUTPATIENT
Start: 2023-06-09 | End: 2023-06-10 | Stop reason: HOSPADM

## 2023-06-09 RX ORDER — AMLODIPINE BESYLATE 5 MG/1
2.5 TABLET ORAL DAILY
Status: DISCONTINUED | OUTPATIENT
Start: 2023-06-09 | End: 2023-06-10 | Stop reason: HOSPADM

## 2023-06-09 RX ORDER — LOSARTAN POTASSIUM 25 MG/1
25 TABLET ORAL DAILY
Status: DISCONTINUED | OUTPATIENT
Start: 2023-06-09 | End: 2023-06-10 | Stop reason: HOSPADM

## 2023-06-09 RX ORDER — SODIUM CHLORIDE 0.9 % (FLUSH) 0.9 %
10 SYRINGE (ML) INJECTION EVERY 12 HOURS SCHEDULED
Status: DISCONTINUED | OUTPATIENT
Start: 2023-06-09 | End: 2023-06-10 | Stop reason: HOSPADM

## 2023-06-09 RX ORDER — SODIUM CHLORIDE 9 MG/ML
40 INJECTION, SOLUTION INTRAVENOUS AS NEEDED
Status: DISCONTINUED | OUTPATIENT
Start: 2023-06-09 | End: 2023-06-10 | Stop reason: HOSPADM

## 2023-06-09 RX ORDER — LIDOCAINE HYDROCHLORIDE 20 MG/ML
INJECTION, SOLUTION INFILTRATION; PERINEURAL
Status: DISCONTINUED | OUTPATIENT
Start: 2023-06-09 | End: 2023-06-09 | Stop reason: HOSPADM

## 2023-06-09 RX ORDER — KETOROLAC TROMETHAMINE 30 MG/ML
15 INJECTION, SOLUTION INTRAMUSCULAR; INTRAVENOUS EVERY 6 HOURS PRN
Status: DISCONTINUED | OUTPATIENT
Start: 2023-06-09 | End: 2023-06-10 | Stop reason: HOSPADM

## 2023-06-09 RX ORDER — HYDROCODONE BITARTRATE AND ACETAMINOPHEN 7.5; 325 MG/1; MG/1
2 TABLET ORAL EVERY 4 HOURS PRN
Status: DISCONTINUED | OUTPATIENT
Start: 2023-06-09 | End: 2023-06-10 | Stop reason: HOSPADM

## 2023-06-09 RX ORDER — SODIUM CHLORIDE 9 MG/ML
INJECTION, SOLUTION INTRAVENOUS
Status: DISCONTINUED | OUTPATIENT
Start: 2023-06-09 | End: 2023-06-09 | Stop reason: HOSPADM

## 2023-06-09 RX ADMIN — HEPARIN SODIUM 5000 UNITS: 5000 INJECTION INTRAVENOUS; SUBCUTANEOUS at 05:16

## 2023-06-09 RX ADMIN — HEPARIN SODIUM 5000 UNITS: 5000 INJECTION INTRAVENOUS; SUBCUTANEOUS at 21:49

## 2023-06-09 RX ADMIN — HYDRALAZINE HYDROCHLORIDE 10 MG: 20 INJECTION INTRAMUSCULAR; INTRAVENOUS at 11:46

## 2023-06-09 RX ADMIN — AMLODIPINE BESYLATE 2.5 MG: 5 TABLET ORAL at 21:49

## 2023-06-09 RX ADMIN — Medication 10 ML: at 08:01

## 2023-06-09 RX ADMIN — LOSARTAN POTASSIUM 25 MG: 25 TABLET, FILM COATED ORAL at 21:49

## 2023-06-09 RX ADMIN — Medication 10 ML: at 21:49

## 2023-06-09 RX ADMIN — DOCUSATE SODIUM 50 MG AND SENNOSIDES 8.6 MG 2 TABLET: 8.6; 5 TABLET, FILM COATED ORAL at 21:49

## 2023-06-09 NOTE — PROGRESS NOTES
[facility] HOSPITALIST PROGRESS NOTE     Patient Identification:  Name:  Clinton Ramirez  Age:  88 y.o.  Sex:  male  :  1935  MRN:  [id number]  Visit Number:  [id number]  ROOM: [address]     Primary Care Provider:  Ewelina Chowdary DO    Length of stay in inpatient status:  1    Subjective     Chief Compliant:    Chief Complaint   Patient presents with    Shortness of Breath    Slow Heart Rate     History of Presenting Illness:    Patient remains ill but stable today, no acute events overnight, no new complaints, denies any fevers or chills, heart rate down to 31 in room with patient, was reported 27 overnight but recovered to 40's when woken up by Nurse, Cardiology consulted and following, formal recommendations and plan for pacemaker placement pending, blood pressure stable, patient remains mildly symptomatic at rest.   Objective     Current Hospital Meds:  atorvastatin, 40 mg, Oral, Daily  cetirizine, 10 mg, Oral, Daily  donepezil, 10 mg, Oral, Nightly  heparin (porcine), 5,000 Units, Subcutaneous, Q8H  senna-docusate sodium, 2 tablet, Oral, BID  sodium chloride, 10 mL, Intravenous, Q12H  ----------------------------------------------------------------------------------------------------------------------  Vital Signs:  Temp:  [97.3 °F (36.3 °C)-98.6 °F (37 °C)] 98.6 °F (37 °C)  Heart Rate:  [33-52] 41  Resp:  [12-24] 16  BP: (141-200)/() 149/57  SpO2:  [93 %-100 %] 98 %  on   ;   Device (Oxygen Therapy): room air  Body mass index is 25.88 kg/m².      Intake/Output Summary (Last 24 hours) at 2023 0946  Last data filed at 2023 0900  Gross per 24 hour   Intake 0 ml   Output 2100 ml   Net -2100 ml      ----------------------------------------------------------------------------------------------------------------------  Physical exam:  Constitutional:  Elderly.  No acute distress.      HENT:  Head:  Normocephalic and atraumatic.  Mouth:  Moist mucous membranes.    Eyes:   Conjunctivae and EOM are normal. No scleral icterus.    Neck:  Neck supple.  No JVD present.    Cardiovascular:  Bradycardic, regular rhythm and normal heart sounds with no murmur.  Pulmonary/Chest:  No respiratory distress, no wheezes, no crackles, with normal breath sounds and good air movement.  Abdominal:  Soft. No distension and no tenderness.   Musculoskeletal:  No tenderness, and no deformity.  No red or swollen joints anywhere.    Neurological:  Alert and oriented to person, place, and time.  No cranial nerve deficit.    Skin:  Skin is warm and dry. No rash noted. No pallor.   Peripheral vascular:  No clubbing, no cyanosis, no edema.  Psychiatric: Appropriate mood and affect  Edited by: Krishna Castro MD at 6/9/2023 0944  ----------------------------------------------------------------------------------------------------------------------  WBC/HGB/HCT/PLT   9.38/13.5/42.2/202 (06/09 0019)  BUN/CREAT/GLUC/ALT/AST/KATH/LIP    13/1.25/107/19/34/--/-- (06/08 1548-06/09 0019)  LYTES - Na/K/Cl/CO2: 141/4.1/108*/21.9* (06/09 0019)  COAG - PT/INR/PTT: 13.0/0.93/-- (06/08 1548)     No results found for: URINECX  No results found for: BLOODCX    I have personally looked at the labs and they are summarized above.  ----------------------------------------------------------------------------------------------------------------------  Detailed radiology reports for the last 24 hours:  XR Chest 1 View    Result Date: 6/8/2023   1.  Mild central pulmonary vascular congestion. 2.  No edema or pleural effusion. 3.  Mild hypoinflated lungs. 4.  Normal heart size.  This report was finalized on 6/8/2023 5:04 PM by Carlos Cloud MD.     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 emergency room 6/8 with  complaints of shortness of breath and slow heart rate.     #Mild Symptomatic Sinus Bradycardia, anticipate pacemaker placement  #Hypertension/Hyperlipidemia/Coronary Artery Disease  #History Valvular Heart Disease; Mild-Mod Aortic Stenosis  #Mild Elevated HS Troponins, suspected due to renal injury  - Cardiology consulted; Recommendations pending, though per emergency room, Dr. 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    #Dementia  - Continue home regimen, supportive care    F: Oral  E: Monitor & Replace as needed   N: NPO Diet NPO Type: Strict NPO  PPx: SQH  Code Status (Patient has no pulse and is not breathing): CPR (Attempt to Resuscitate)  Medical Interventions (Patient has pulse or is breathing): Full Support     Dispo: Pending workup and clinical improvement    *This patient is considered high risk secondary to symptomatic bradycardia, Acute Kidney Injury.     Edited by: Krishna Castro MD at 6/9/2023 5468  [facility]ist

## 2023-06-09 NOTE — PLAN OF CARE
Problem: Adult Inpatient Plan of Care  Goal: Plan of Care Review  Outcome: Ongoing, Progressing  Goal: Patient-Specific Goal (Individualized)  Outcome: Ongoing, Progressing  Goal: Absence of Hospital-Acquired Illness or Injury  Outcome: Ongoing, Progressing  Intervention: Identify and Manage Fall Risk  Recent Flowsheet Documentation  Taken 6/9/2023 1300 by Ruthann Cali RN  Safety Promotion/Fall Prevention:   activity supervised   assistive device/personal items within reach   clutter free environment maintained   fall prevention program maintained   nonskid shoes/slippers when out of bed   room organization consistent   safety round/check completed  Taken 6/9/2023 1100 by Ruthann Cali RN  Safety Promotion/Fall Prevention:   activity supervised   assistive device/personal items within reach   clutter free environment maintained   fall prevention program maintained   nonskid shoes/slippers when out of bed   room organization consistent   safety round/check completed  Taken 6/9/2023 0758 by Ruthann Cali RN  Safety Promotion/Fall Prevention:   activity supervised   clutter free environment maintained   assistive device/personal items within reach   fall prevention program maintained   nonskid shoes/slippers when out of bed   room organization consistent   safety round/check completed  Taken 6/9/2023 0700 by Ruthann Cali RN  Safety Promotion/Fall Prevention:   activity supervised   assistive device/personal items within reach   clutter free environment maintained   fall prevention program maintained   nonskid shoes/slippers when out of bed   room organization consistent   safety round/check completed  Intervention: Prevent and Manage VTE (Venous Thromboembolism) Risk  Recent Flowsheet Documentation  Taken 6/9/2023 1300 by Ruthann Cali RN  Activity Management: activity encouraged  Taken 6/9/2023 1100 by Ruthann Cali RN  Activity Management: activity encouraged  Taken 6/9/2023 0758 by Ruthann Cali  RN  Activity Management: activity encouraged  Taken 6/9/2023 0700 by Ruthann Cali RN  Activity Management: activity encouraged  Intervention: Prevent Infection  Recent Flowsheet Documentation  Taken 6/9/2023 1300 by Ruthann Clai RN  Infection Prevention:   rest/sleep promoted   single patient room provided  Taken 6/9/2023 1100 by Ruthann Cali RN  Infection Prevention:   rest/sleep promoted   single patient room provided  Taken 6/9/2023 0758 by Ruthann Cali RN  Infection Prevention:   rest/sleep promoted   single patient room provided  Taken 6/9/2023 0700 by Ruthann Cali RN  Infection Prevention:   rest/sleep promoted   single patient room provided  Goal: Optimal Comfort and Wellbeing  Outcome: Ongoing, Progressing  Intervention: Provide Person-Centered Care  Recent Flowsheet Documentation  Taken 6/9/2023 0758 by Ruthann Cali RN  Trust Relationship/Rapport:   care explained   choices provided   emotional support provided   empathic listening provided   questions answered   questions encouraged   reassurance provided   thoughts/feelings acknowledged  Goal: Readiness for Transition of Care  Outcome: Ongoing, Progressing     Problem: Fall Injury Risk  Goal: Absence of Fall and Fall-Related Injury  Outcome: Ongoing, Progressing  Intervention: Identify and Manage Contributors  Recent Flowsheet Documentation  Taken 6/9/2023 1300 by Ruthann Cali RN  Medication Review/Management: medications reviewed  Taken 6/9/2023 1100 by Ruthann Cali RN  Medication Review/Management: medications reviewed  Taken 6/9/2023 0758 by Ruthann Cali RN  Medication Review/Management: medications reviewed  Taken 6/9/2023 0700 by Ruthann Cali RN  Medication Review/Management: medications reviewed  Intervention: Promote Injury-Free Environment  Recent Flowsheet Documentation  Taken 6/9/2023 1300 by Ruthann Cali RN  Safety Promotion/Fall Prevention:   activity supervised   assistive device/personal items within reach    clutter free environment maintained   fall prevention program maintained   nonskid shoes/slippers when out of bed   room organization consistent   safety round/check completed  Taken 6/9/2023 1100 by Ruthann Cali RN  Safety Promotion/Fall Prevention:   activity supervised   assistive device/personal items within reach   clutter free environment maintained   fall prevention program maintained   nonskid shoes/slippers when out of bed   room organization consistent   safety round/check completed  Taken 6/9/2023 0758 by Ruthann Cali RN  Safety Promotion/Fall Prevention:   activity supervised   clutter free environment maintained   assistive device/personal items within reach   fall prevention program maintained   nonskid shoes/slippers when out of bed   room organization consistent   safety round/check completed  Taken 6/9/2023 0700 by Ruthann Cali RN  Safety Promotion/Fall Prevention:   activity supervised   assistive device/personal items within reach   clutter free environment maintained   fall prevention program maintained   nonskid shoes/slippers when out of bed   room organization consistent   safety round/check completed     Problem: Skin Injury Risk Increased  Goal: Skin Health and Integrity  Outcome: Ongoing, Progressing     Problem: Hypertension Comorbidity  Goal: Blood Pressure in Desired Range  Outcome: Ongoing, Progressing  Intervention: Maintain Blood Pressure Management  Recent Flowsheet Documentation  Taken 6/9/2023 1300 by Ruthann Cali RN  Medication Review/Management: medications reviewed  Taken 6/9/2023 1100 by Ruthann Cali RN  Medication Review/Management: medications reviewed  Taken 6/9/2023 0758 by Ruthann Cali RN  Medication Review/Management: medications reviewed  Taken 6/9/2023 0700 by Ruthann Cali RN  Medication Review/Management: medications reviewed   Goal Outcome Evaluation:   Pt alert and oriented x 4. Pt's heart rate has remained hilary all day. Pt remains asymptomatic.  No complaints. Bed is locked in low position with call light in reach.

## 2023-06-09 NOTE — CASE MANAGEMENT/SOCIAL WORK
Discharge Planning Assessment  [facility]     Patient Name: Clinton Ramirez  MRN: [id number]  Today's Date: 6/9/2023    Admit Date: 6/8/2023    Plan: SS received nursing consult for Advanced age. assit with safe dc plan. SS spoke with pt at bedside on this date. Pt lives at [address] with spouse Delaney and plans to return back home at discharge. Pt does not utilize HH services or DME at this time. Pt PCP Ewelina Chowdary. Pt states he's independent with ADL's prior to hospital admisison. Pt has Living will, no POA. Pt spouse or daughter will provide transportation at discharge. SS to follow.   Discharge Needs Assessment       Row Name 06/09/23 1053       Living Environment    People in Home spouse    Current Living Arrangements home    Potentially Unsafe Housing Conditions none    Primary Care Provided by self;spouse/significant other    Provides Primary Care For no one    Family Caregiver if Needed spouse    Quality of Family Relationships helpful;involved;supportive    Able to Return to Prior Arrangements yes       Resource/Environmental Concerns    Resource/Environmental Concerns none       Food Insecurity    Within the past 12 months, you worried that your food would run out before you got the money to buy more. Never true    Within the past 12 months, the food you bought just didn't last and you didn't have money to get more. Never true       Transition Planning    Patient/Family Anticipates Transition to home with family    Transportation Anticipated family or friend will provide       Discharge Needs Assessment    Equipment Currently Used at Home none    Concerns to be Addressed --  Advanced age. assit with safe dc plan.              Discharge Plan       Row Name 06/09/23 1054       Plan    Plan SS received nursing consult for Advanced age. assit with safe dc plan. SS spoke with pt at bedside on this date. Pt lives at [address] with spouse Delaney and plans  to return back home at discharge. Pt does not utilize  services or DME at this time. Pt PCP Ewelina Chowdary. Pt states he's independent with ADL's prior to hospital admisison. Pt has Living will, no POA. Pt spouse or daughter will provide transportation at discharge. SS to follow.             Expected Discharge Date and Time       Expected Discharge Date Expected Discharge Time    Jun 11, 2023            Demographic Summary       Row Name 06/09/23 1051       General Information    Admission Type inpatient    Arrived From home    Referral Source nursing    Reason for Consult --  Advanced age. assit with safe dc plan.    Preferred Language English             BRAD Kessler

## 2023-06-09 NOTE — PLAN OF CARE
Goal Outcome Evaluation:  Plan of Care Reviewed With: patient        Progress: improving          Patient has been bradycardic in the 30's for the majority of the shift; however,  he remains asymptomatic. He has been held NPO since midnight. Otherwise, no significant changes noted.

## 2023-06-09 NOTE — CONSULTS
Date of Admit: 6/8/2023  Date of Consult: 06/09/23  No ref. provider found        Symptomatic bradycardia      Assessment      1. Symptomatic bradycardia  2. Mobitz 2 heart block      Recommendations     1. Cardiac.  Patient with history of SVT over the years currently came in due to symptomatic bradycardia.  EKG shows Mobitz 2 heart block.  Patient n.p.o. for permanent pacemaker.  We will consider proceeding with a dual-chamber permanent pacemaker.        Reason for consultation:    Subjective     History of Present Illness    Clinton Ramirez is a 88 y.o. male with PMH significant for:   1. SVT  a. 21-hour Holter monitor, 10/16/2018, Avg Hr 65, Min HR 47, Max .  Predominantly normal sinus rhythm.  1 3 beat run SVT at 143 bpm.  b. Holter monitor, 24-hour, 2/25/2020, Avg HR 65, Min HR 43, Max Hr 107.  Normal sinus rhythm with RBBB.  PACs occasionally.  No A. fib.  No pauses.  c. 14 day Holter monitor, 9/24/2021, Avg HR 67, Min HR 40, Max  bpm.  Predominantly sinus rhythm. 0% AF. 0.12% PVC burden.  246 events of SVT, longest 53 seconds, fastest 159 bpm.  d. Initiated on Toprol-XL 12.5 mg   2. CAD  a. Echo, 5/11/2018, EF 61 - 65%, mild AR.  Mild to mod AS.  Mild MR.  b. Stress test, 6/28/2018, EF over 70%.  No ischemia  c. [facility], 7/11/2018, single-vessel coronary artery disease with 50% stenosis of the mid circumflex, EF 65%.  Mild aortic stenosis.  No PCI  d. Echo, 2/28/2020, EF 56-60%, mild calcification of aortic valve with mild aortic valve stenosis.  Mild MAC.  Bileaflet mitral valve thickening present.  Mild TR.  Moderate pulmonary hypertension.  LA mildly dilated.  e. Bilateral carotid ultrasound, 5/27/2021,  No acute findings in the arteries of the neck.  3. Hypertension  4. Hyperlipidemia  5. Valvular heart disease  a. Mild to moderate AAS  6. Arthritis  7. Multiple benign polyps of large intestine  8. Pancreatitis  9. 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 Negative chest  x-ray  11. Mild renal insufficiency  12. History of rheumatic fever  a.  at age 20 while in  hospital  13. Skin cancer  a. Basal cell from ears, melanoma from neck  b. Currently follow with dr. Dupree undergoing radiation.   14. Mild Dementia     Patient was previously evaluated by EP Dr. Chon Henson with discussion of possibly PPM.  He was admitted on 06/08 to the Hospital medicine service at this facility after presentation to the ED with bradycardia and shortness of breath with his wife reporting she had been montioring his HR and noted it would not read on their home BP cuff.  She checked it manually and reported it was 20 beats per minute prompting further emergent evaluation.   In the ED, he was found to have symptomatic bradycardia.  Cardiology was consulted for further evaluation and treatment.        Patient is evaluated resting comfortably in bed in PCU room 206.  He reports he is chest pain-free and without shortness of breath.  He reports he has been experiencing intermittent bradycardia for quite some time.  He denies known home beta-blocking agents or antiarrhythmics of any variety.  He reports he was previously evaluated in consultation by Dr. Chon Henson in his [facility] office during the year of 2022 as he was experiencing both bradycardia as well as short runs of SVT.  He had worn a Holter monitor in 2021 with 246 episodes of SVT and was reinitiated on Toprol-XL at that time however given relative bradycardia and fatigue this was ultimately stopped.  He did then continue to have sinus bradycardia with discussion of possibly potentially implanting pacemaker but ultimately decision was made to hold on pacemaker placement at that time.    Since that time, patient reports he has been experiencing ongoing bradycardia.  He denies chest pain at the time of evaluation and reports shortness of breath has improved.          Last Echo:  Results for orders placed during the hospital encounter of  02/28/20    Adult Transthoracic Echo Complete W/ Cont if Necessary Per Protocol    Interpretation Summary  · Normal left ventricular cavity size and wall thickness noted.  · Left ventricular systolic function is normal. Estimated EF appears to be in the range of 56 - 60%.  · Left ventricular diastolic dysfunction (grade I) consistent with impaired relaxation.  · There is moderate calcification of the aortic valve with mild aortic valve stenosis, CORIN(I,D) 1.8 cm^2.  · Moderate MAC is present. There is bileaflet mitral valve thickening present. No mitral valve regurgitation is present. No significant mitral valve stenosis is present.  · Mild tricuspid valve regurgitation is present with moderate pulmonary hypertension is present (50 mm Hg).  · Left atrial cavity size is mildly dilated.  · There is no evidence of pericardial effusion.      Last Stress: See above regarding PMH.     Last Cath: See above regarding PMH.       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   • Cervical radiculopathy 5/11/2016   • Elevated cholesterol    • Hypertension    • Hypokalemia    • Multiple benign polyps of large intestine    • Need for prophylactic vaccination and inoculation against influenza    • Pancreatitis    • Paralytic ileus    • PPD positive     negative chest xray    • Renal insufficiency     mild-told to stop Naproxen   • Rheumatic fever     age 20 while in -hospitalized    • Skin cancer     basal cell from ears, melanoma from neck area    • Valvular disease    • Wears glasses    • Wears partial dentures     upper      Past Surgical History:   Procedure Laterality Date   • BIOPSY PROSTATE NEEDLE / PUNCH / INCISIONAL     • CARDIAC CATHETERIZATION N/A 7/11/2018    Procedure: Left Heart Cath;  Surgeon: Claire Scott MD;  Location: [facility] CATH INVASIVE LOCATION;  Service: Cardiovascular   • COLONOSCOPY     • EAR BIOPSY      basal cell skin cancer   • SKIN CANCER  EXCISION      melanoma from neck    • THROAT SURGERY      Biopsy     Family History   Problem Relation Age of Onset   • Pancreatic cancer Sister    • Tuberculosis Other    • Diabetes Other    • Heart disease Father    • Lung cancer Brother    • Leukemia Sister      Social History     Tobacco Use   • Smoking status: Former     Packs/day: 3.00     Years: 11.00     Pack years: 33.00     Types: Cigarettes     Quit date:      Years since quittin.4   • Smokeless tobacco: Never   Vaping Use   • Vaping Use: Never used   Substance Use Topics   • Alcohol use: Not Currently     Comment: up to 3 beers per day, not everyday   • Drug use: No     Facility-Administered Medications Prior to Admission   Medication Dose Route Frequency Provider Last Rate Last Admin   • lidocaine (XYLOCAINE) 2 % jelly   Topical PRN Garrick, Jenny Mullins, VERONICA         Medications Prior to Admission   Medication Sig Dispense Refill Last Dose   • aspirin 81 MG chewable tablet Chew 1 tablet Every Morning.   2023 at 0800   • atorvastatin (LIPITOR) 40 MG tablet Take 1 tablet by mouth Daily. 90 tablet 3 2023 at 0800   • donepezil (ARICEPT) 10 MG tablet Take 1 tablet by mouth Every Night. 90 tablet 3 2023   • felodipine (PLENDIL) 2.5 MG 24 hr tablet Take 3 tablets by mouth Daily. (Patient taking differently: Take 1 tablet by mouth 2 (Two) Times a Day.) 270 tablet 3 2023 at 0800   • ibuprofen (ADVIL,MOTRIN) 200 MG tablet Take 1 tablet by mouth Every 6 (Six) Hours As Needed for Mild Pain.   2023   • losartan (COZAAR) 25 MG tablet Take 0.5 tablets by mouth Daily. 45 tablet 3 2023 at 0800     Allergies:  Patient has no known allergies.    Review of Systems   Constitutional:  Positive for fatigue. Negative for activity change, chills and diaphoresis.   HENT:  Negative for congestion and drooling.    Eyes:  Negative for pain and discharge.   Respiratory:  Negative for apnea, shortness of breath and wheezing.    Cardiovascular:   Negative for chest pain and leg swelling.   Gastrointestinal:  Negative for abdominal distention, nausea and vomiting.   Endocrine: Negative for cold intolerance and heat intolerance.   Genitourinary:  Negative for difficulty urinating, dysuria and enuresis.   Musculoskeletal:  Negative for arthralgias and gait problem.   Skin:  Negative for color change and pallor.   Allergic/Immunologic: Negative for environmental allergies and food allergies.   Neurological:  Negative for dizziness and headaches.   Hematological:  Negative for adenopathy.   Psychiatric/Behavioral:  Negative for agitation, behavioral problems and confusion.      Objective       Objective      Vital Signs  Temp:  [97.3 °F (36.3 °C)-98.6 °F (37 °C)] 98.6 °F (37 °C)  Heart Rate:  [33-52] 41  Resp:  [12-24] 16  BP: (141-200)/() 149/57  Vital Signs (last 72 hrs)         06/06 0700  06/07 0659 06/07 0700  06/08 0659 06/08 0700  06/09 0659 06/09 0700  06/09 0835   Most Recent      Temp (°F)     97.3 -  98.1      98.6     98.6 (37) 06/09 0800    Heart Rate     33 -  52    40 -  41     41 06/09 0730    Resp     12 -  24      16     16 06/09 0700    BP     141/57 -  200/77    149/57 -  189/57     149/57 06/09 0730    SpO2 (%)     93 -  100    97 -  98     98 06/09 0730          Body mass index is 25.88 kg/m².  Documented weights    06/08/23 1542 06/08/23 1807 06/09/23 0400   Weight: 80.3 kg (177 lb) 78.7 kg (173 lb 8 oz) 79.5 kg (175 lb 4.3 oz)            Intake/Output Summary (Last 24 hours) at 6/9/2023 0835  Last data filed at 6/9/2023 0516  Gross per 24 hour   Intake --   Output 2100 ml   Net -2100 ml     Physical Exam  Vitals and nursing note reviewed.   Constitutional:       General: He is awake.      Appearance: Normal appearance.      Comments: Patient is very kind.    HENT:      Head: Normocephalic and atraumatic.   Eyes:      General: Lids are normal.      Conjunctiva/sclera:      Right eye: Right conjunctiva is not injected.      Left eye:  Left conjunctiva is not injected.   Cardiovascular:      Rate and Rhythm: Bradycardia present.      Comments: Faint bilateral non-pitting edema  Pulmonary:      Effort: No tachypnea or bradypnea.      Breath sounds: No decreased breath sounds, wheezing, rhonchi or rales.   Abdominal:      General: Bowel sounds are normal.      Palpations: Abdomen is soft.      Tenderness: There is no abdominal tenderness.   Musculoskeletal:      Comments: Faint bilateral non-pitting edema   Skin:     General: Skin is warm and dry.   Neurological:      Mental Status: He is alert and oriented to person, place, and time.   Psychiatric:         Attention and Perception: Attention normal.         Mood and Affect: Mood normal.         Speech: Speech normal.         Behavior: Behavior is cooperative.           Results review     Results Review:    I reviewed the patient's new clinical results.  Results from last 7 days   Lab Units 06/08/23  1811 06/08/23  1548   CK TOTAL U/L  --  75   HSTROP T ng/L 20* 23*     Results from last 7 days   Lab Units 06/09/23  0019 06/08/23  1548   WBC 10*3/mm3 9.38 7.77   HEMOGLOBIN g/dL 13.5 13.6   PLATELETS 10*3/mm3 202 218     Results from last 7 days   Lab Units 06/09/23  0019 06/08/23  1548   SODIUM mmol/L 141 138   POTASSIUM mmol/L 4.1 4.3   CHLORIDE mmol/L 108* 103   CO2 mmol/L 21.9* 26.6   BUN mg/dL 13 17   CREATININE mg/dL 1.25 1.56*   CALCIUM mg/dL 9.0 9.0   GLUCOSE mg/dL 107* 139*   ALT (SGPT) U/L  --  19   AST (SGOT) U/L  --  34     Lab Results   Component Value Date    INR 0.93 06/08/2023     No results found for: MG  Lab Results   Component Value Date    TSH 8.140 (H) 06/08/2023    CHLPL 166 05/12/2016    TRIG 66 06/08/2023    HDL 42 06/08/2023    LDL 51 06/08/2023      Lab Results   Component Value Date    PROBNP 1,571.0 06/08/2023       ECG           ECG/EMG Results (last 24 hours)       Procedure Component Value Units Date/Time    ECG 12 Lead Chest Pain [[id number]] Collected: 06/08/23 2348      Updated: 06/08/23 1628     QT Interval 632 ms      QTC Interval 475 ms     Narrative:      Test Reason : Chest Pain  Blood Pressure :   */*   mmHG  Vent. Rate :  34 BPM     Atrial Rate :  34 BPM     P-R Int : 170 ms          QRS Dur : 128 ms      QT Int : 632 ms       P-R-T Axes :  73  37  55 degrees     QTc Int : 475 ms    ** Critical Test Result: Low HR  Marked sinus bradycardia  Right bundle branch block  Abnormal ECG  When compared with ECG of 10-JUL-2018 14:50,  Vent. rate has decreased BY  25 BPM    Referred By: MARGOT           Confirmed By:     SCANNED - TELEMETRY   [[id number]] Resulted: 06/08/23     Updated: 06/09/23 0653    SCANNED - TELEMETRY   [[id number]] Resulted: 06/08/23     Updated: 06/09/23 0655    SCANNED - TELEMETRY   [[id number]] Resulted: 06/08/23     Updated: 06/09/23 0655            Imaging Results (Last 72 Hours)       Procedure Component Value Units Date/Time    XR Chest 1 View [[id number]] Collected: 06/08/23 1702     Updated: 06/08/23 1706    Narrative:      Procedure: Portable chest x-ray examination performed on 06/08/2023.  Semierect position. Single view.     HISTORY: Chest pain.     FINDINGS:     Normal heart size.  No lobar consolidation or edema.  Mild central pulmonary vascular congestion.  Minimal atelectasis at the lung bases.  No pleural effusion or pneumothorax.  Mild hypoinflated lungs.  Mild degenerative disc disease throughout the thoracic spine.  Mild osteoarthritis at the glenohumeral joints.       Impression:         1.  Mild central pulmonary vascular congestion.  2.  No edema or pleural effusion.  3.  Mild hypoinflated lungs.  4.  Normal heart size.     This report was finalized on 6/8/2023 5:04 PM by Carlos Cloud MD.                       I have discussed my impression and recommendations with the patient and family.    Thank you very much for asking us to be involved in this patient's care.  We will follow along with you.        This document has been  electronically signed by Jacqueline Hennessy PA-C  June 9, 2023 08:35 EDT      Part of this note may be an electronic transcription/translation of spoken language to printed text using the Dragon Dictation System.  Please note that portions of this note were completed with a voice recognition program.    .Electronically signed by Julian Porter MD, 06/09/23, 4:16 PM EDT.

## 2023-06-10 ENCOUNTER — READMISSION MANAGEMENT (OUTPATIENT)
Dept: CALL CENTER | Facility: HOSPITAL | Age: 88
End: 2023-06-10
Payer: MEDICARE

## 2023-06-10 VITALS
RESPIRATION RATE: 19 BRPM | DIASTOLIC BLOOD PRESSURE: 75 MMHG | HEART RATE: 80 BPM | BODY MASS INDEX: 26.16 KG/M2 | TEMPERATURE: 98 F | OXYGEN SATURATION: 97 % | SYSTOLIC BLOOD PRESSURE: 171 MMHG | WEIGHT: 176.59 LBS | HEIGHT: 69 IN

## 2023-06-10 LAB
ANION GAP SERPL CALCULATED.3IONS-SCNC: 11.7 MMOL/L (ref 5–15)
BUN SERPL-MCNC: 16 MG/DL (ref 8–23)
BUN/CREAT SERPL: 12.6 (ref 7–25)
CALCIUM SPEC-SCNC: 9 MG/DL (ref 8.6–10.5)
CHLORIDE SERPL-SCNC: 107 MMOL/L (ref 98–107)
CO2 SERPL-SCNC: 20.3 MMOL/L (ref 22–29)
CREAT SERPL-MCNC: 1.27 MG/DL (ref 0.76–1.27)
DEPRECATED RDW RBC AUTO: 42.2 FL (ref 37–54)
EGFRCR SERPLBLD CKD-EPI 2021: 54.3 ML/MIN/1.73
ERYTHROCYTE [DISTWIDTH] IN BLOOD BY AUTOMATED COUNT: 12.1 % (ref 12.3–15.4)
GLUCOSE SERPL-MCNC: 113 MG/DL (ref 65–99)
HCT VFR BLD AUTO: 42.2 % (ref 37.5–51)
HGB BLD-MCNC: 14.1 G/DL (ref 13–17.7)
INR PPP: 1.04 (ref 0.9–1.1)
MCH RBC QN AUTO: 31.4 PG (ref 26.6–33)
MCHC RBC AUTO-ENTMCNC: 33.4 G/DL (ref 31.5–35.7)
MCV RBC AUTO: 94 FL (ref 79–97)
PLATELET # BLD AUTO: 194 10*3/MM3 (ref 140–450)
PMV BLD AUTO: 11.1 FL (ref 6–12)
POTASSIUM SERPL-SCNC: 4 MMOL/L (ref 3.5–5.2)
PROTHROMBIN TIME: 14.1 SECONDS (ref 12.1–14.7)
RBC # BLD AUTO: 4.49 10*6/MM3 (ref 4.14–5.8)
SODIUM SERPL-SCNC: 139 MMOL/L (ref 136–145)
WBC NRBC COR # BLD: 11.86 10*3/MM3 (ref 3.4–10.8)

## 2023-06-10 PROCEDURE — 80048 BASIC METABOLIC PNL TOTAL CA: CPT | Performed by: INTERNAL MEDICINE

## 2023-06-10 PROCEDURE — 25010000002 HEPARIN (PORCINE) PER 1000 UNITS: Performed by: INTERNAL MEDICINE

## 2023-06-10 PROCEDURE — 99239 HOSP IP/OBS DSCHRG MGMT >30: CPT | Performed by: INTERNAL MEDICINE

## 2023-06-10 PROCEDURE — 93010 ELECTROCARDIOGRAM REPORT: CPT | Performed by: SPECIALIST

## 2023-06-10 PROCEDURE — 85610 PROTHROMBIN TIME: CPT | Performed by: INTERNAL MEDICINE

## 2023-06-10 PROCEDURE — 99232 SBSQ HOSP IP/OBS MODERATE 35: CPT | Performed by: INTERNAL MEDICINE

## 2023-06-10 PROCEDURE — 93005 ELECTROCARDIOGRAM TRACING: CPT | Performed by: INTERNAL MEDICINE

## 2023-06-10 PROCEDURE — 85027 COMPLETE CBC AUTOMATED: CPT | Performed by: INTERNAL MEDICINE

## 2023-06-10 RX ORDER — FELODIPINE 2.5 MG/1
2.5 TABLET, EXTENDED RELEASE ORAL 2 TIMES DAILY
Start: 2023-06-10

## 2023-06-10 RX ORDER — LORATADINE 10 MG/1
10 TABLET ORAL DAILY
Start: 2023-06-10

## 2023-06-10 RX ADMIN — CETIRIZINE HYDROCHLORIDE 10 MG: 10 TABLET, FILM COATED ORAL at 08:13

## 2023-06-10 RX ADMIN — LOSARTAN POTASSIUM 25 MG: 25 TABLET, FILM COATED ORAL at 08:13

## 2023-06-10 RX ADMIN — DOCUSATE SODIUM 50 MG AND SENNOSIDES 8.6 MG 2 TABLET: 8.6; 5 TABLET, FILM COATED ORAL at 08:13

## 2023-06-10 RX ADMIN — Medication 10 ML: at 08:13

## 2023-06-10 RX ADMIN — AMLODIPINE BESYLATE 2.5 MG: 5 TABLET ORAL at 08:13

## 2023-06-10 RX ADMIN — ATORVASTATIN CALCIUM 40 MG: 40 TABLET, FILM COATED ORAL at 08:13

## 2023-06-10 RX ADMIN — Medication 10 ML: at 08:14

## 2023-06-10 RX ADMIN — HEPARIN SODIUM 5000 UNITS: 5000 INJECTION INTRAVENOUS; SUBCUTANEOUS at 06:25

## 2023-06-10 NOTE — OUTREACH NOTE
Prep Survey      Flowsheet Row Responses   [facility] patient discharged from? [facility]   Is LACE score < 7 ? Yes   Eligibility [facility]   Date of Admission 06/08/23   Date of Discharge 06/10/23   Discharge Disposition Home or Self Care   Discharge diagnosis Symptomatic bradycardia, status post dual chamber pacemaker   Does the patient have one of the following disease processes/diagnoses(primary or secondary)? Other   Does the patient have Home health ordered? No   Is there a DME ordered? No   Prep survey completed? Yes            Lola SCOTT - Registered Nurse

## 2023-06-10 NOTE — PROGRESS NOTES
.  Patient Identification:  Name:  Clinton Ramirez  Age:  88 y.o.  Sex:  male  :  1935  MRN:  [id number]  Visit Number:  [id number]    Chief Complaint:   Bradycardia    Subjective:    Patient feeling better.  Patient underwent permanent pacemaker yesterday.  Has no discomfort.  Denies any dizziness lightheadedness.  ----------------------------------------------------------------------------------------------------------------------  [facility] Meds:  amLODIPine, 2.5 mg, Oral, Daily  atorvastatin, 40 mg, Oral, Daily  cetirizine, 10 mg, Oral, Daily  heparin (porcine), 5,000 Units, Subcutaneous, Q8H  losartan, 25 mg, Oral, Daily  senna-docusate sodium, 2 tablet, Oral, BID  sodium chloride, 10 mL, Intravenous, Q12H  sodium chloride, 10 mL, Intravenous, Q12H         ----------------------------------------------------------------------------------------------------------------------  Vital Signs:  Temp:  [97.8 °F (36.6 °C)-98.7 °F (37.1 °C)] 98 °F (36.7 °C)  Heart Rate:  [] 80  Resp:  [] 19  BP: (122-184)/() 171/75      23  1807 23  0400 06/10/23  0400   Weight: 78.7 kg (173 lb 8 oz) 79.5 kg (175 lb 4.3 oz) 80.1 kg (176 lb 9.4 oz)     Body mass index is 26.08 kg/m².    Intake/Output Summary (Last 24 hours) at 6/10/2023 1023  Last data filed at 6/10/2023 0900  Gross per 24 hour   Intake 361.25 ml   Output 700 ml   Net -338.75 ml     NPO Diet NPO Type: Strict NPO  ----------------------------------------------------------------------------------------------------------------------  Physical exam:  Constitutional:    HENT:  Head:  Normocephalic and atraumatic.    Eyes:  Conjunctivae and EOM are normal.  Pupils are equal, round, and reactive to light.  No scleral icterus.    Neck:  Neck supple.  No JVD present.    Cardiovascular: Normal rate, regular rhythm, S1 S2+, NO S3 / S4  Pulmonary/Chest:  Vesicular breath sounds B/L, left upper chest with permanent pacemaker in place,  wound dry,  Abdominal:  Soft.  Bowel sounds are normal.  No distension and no tenderness.      Neurological:  Alert and oriented to person, place, and time. No focal deficits.  Skin:  Skin is warm and dry. No rash noted. No pallor.   Musculoskeletal:  No edema, no tenderness, and no deformity.  No red or swollen joints anywhere.   Peripheral vascular:  2+ Pulses B/L DP  ----------------------------------------------------------------------------------------------------------------------    ----------------------------------------------------------------------------------------------------------------------  Results from last 7 days   Lab Units 06/08/23  1811 06/08/23  1548   CK TOTAL U/L  --  75   HSTROP T ng/L 20* 23*     Results from last 7 days   Lab Units 06/10/23  0110 06/09/23  1910 06/09/23  0019 06/08/23  1629 06/08/23  1548   LACTATE mmol/L  --   --   --  1.4  --    WBC 10*3/mm3 11.86* 9.06 9.38  --  7.77   HEMOGLOBIN g/dL 14.1 15.2 13.5  --  13.6   HEMATOCRIT % 42.2 47.0 42.2  --  41.1   MCV fL 94.0 97.7* 96.8  --  95.8   MCHC g/dL 33.4 32.3 32.0  --  33.1   PLATELETS 10*3/mm3 194 142 202  --  218   INR  1.04  --   --   --  0.93         Results from last 7 days   Lab Units 06/10/23  0110 06/09/23  1910 06/09/23  0019 06/08/23  1548   SODIUM mmol/L 139 140 141 138   POTASSIUM mmol/L 4.0 4.5 4.1 4.3   CHLORIDE mmol/L 107 107 108* 103   CO2 mmol/L 20.3* 19.6* 21.9* 26.6   BUN mg/dL 16 15 13 17   CREATININE mg/dL 1.27 1.35* 1.25 1.56*   CALCIUM mg/dL 9.0 9.3 9.0 9.0   GLUCOSE mg/dL 113* 119* 107* 139*   ALBUMIN g/dL  --   --   --  3.8   BILIRUBIN mg/dL  --   --   --  0.5   ALK PHOS U/L  --   --   --  111   AST (SGOT) U/L  --   --   --  34   ALT (SGPT) U/L  --   --   --  19   Estimated Creatinine Clearance: 45.6 mL/min (by C-G formula based on SCr of 1.27 mg/dL).    No results found for: AMMONIA  Results from last 7 days   Lab Units 06/08/23  1548   CHOLESTEROL mg/dL 107   TRIGLYCERIDES mg/dL 66   HDL CHOL  mg/dL 42   LDL CHOL mg/dL 51     No results found for: BLOODCX  No results found for: URINECX  No results found for: WOUNDCX  No results found for: STOOLCX  Echo:  Results for orders placed during the hospital encounter of 06/08/23    Adult Transthoracic Echo Complete W/ Cont if Necessary Per Protocol    Interpretation Summary  •  Left ventricular systolic function is normal. Calculated left ventricular EF = 64% Left ventricular ejection fraction appears to be 61 - 65%.  •  Left ventricular diastolic function was normal.  •  Moderate tricuspid valve regurgitation is present.  •  Estimated right ventricular systolic pressure from tricuspid regurgitation is moderately elevated (45-55 mmHg).  •  Moderate pulmonary hypertension is present.    @EKG: Paced rhythm    I have personally looked at the labs and they are summarized above.  ----------------------------------------------------------------------------------------------------------------------  Imaging Results (Last 24 Hours)     Procedure Component Value Units Date/Time    XR Chest 1 View [[id number]] Collected: 06/09/23 2033     Updated: 06/09/23 2037    Narrative:      Procedure: Portable chest x-ray examination performed on 06/09/2020 2023. Single view. Supine position. Portable technique.     HISTORY: Left-sided pacemaker placement.     FINDINGS:     Left-sided pacemaker in place.  Pacing leads to the right atrium and right ventricle.  Central pulmonary vascular congestion with mild edema.  Normal heart size.  Mild hyperinflated lungs.  No fracture or dislocation.  No lytic or blastic lesion.       Impression:         1.  Mild central pulmonary vascular congestion with mild edema.  2.  Left-sided pacemaker in place with leads to the right atrium and  right ventricle.  3.  Skin staples noted overlying the left pacemaker.  4.  No features of pneumonia, pleural effusion, or pneumothorax.     This report was finalized on 6/9/2023 8:35 PM by Carlos Cloud MD.            ----------------------------------------------------------------------------------------------------------------------    Assessment:  Bradycardia  Hypertension    Plan:  1 cardiac.  Patient underwent dual-chamber permanent pacemaker yesterday due to symptomatic bradycardia, Mobitz 2 heart block.  Patient will need to have staples removed in 3 weeks time.  Can see him back in clinic for same.  Patient to follow-up with Dr. Torres after that.  #2 hypertension.  Patient blood pressure is running high.  Would advise to keep systolic pressure less than 139.        This document has been electronically signed by Julian Porter MD [facility], Interventional Cardiology  Jocelyne 10, 2023 10:23 EDT

## 2023-06-10 NOTE — PLAN OF CARE
Problem: Adult Inpatient Plan of Care  Goal: Plan of Care Review  Outcome: Ongoing, Progressing  Flowsheets (Taken 6/10/2023 0404)  Outcome Evaluation: Pt resting in bed, call light in reach, and bed alarm set. Pt is on room air. No new needs noted at this time.  Goal: Patient-Specific Goal (Individualized)  Outcome: Ongoing, Progressing  Goal: Absence of Hospital-Acquired Illness or Injury  Outcome: Ongoing, Progressing  Intervention: Identify and Manage Fall Risk  Recent Flowsheet Documentation  Taken 6/10/2023 0300 by Buffy Meek RN  Safety Promotion/Fall Prevention:   activity supervised   assistive device/personal items within [facility]   fall prevention program maintained   safety round/check completed  Taken 6/10/2023 0100 by Buffy Meek RN  Safety Promotion/Fall Prevention:   activity supervised   assistive device/personal items within [facility]   fall prevention program maintained   safety round/check completed  Intervention: Prevent Skin Injury  Recent Flowsheet Documentation  Taken 6/10/2023 0200 by Buffy Meek RN  Skin Protection:   adhesive use limited   incontinence pads utilized   transparent dressing maintained   tubing/devices free from skin contact  Intervention: Prevent and Manage VTE (Venous Thromboembolism) Risk  Recent Flowsheet Documentation  Taken 6/10/2023 0300 by Buffy Meek RN  Activity Management: activity encouraged  Taken 6/10/2023 0200 by Buffy Meek RN  VTE Prevention/Management: (see MAR) other (see comments)  Range of Motion: active ROM (range of motion) encouraged  Taken 6/10/2023 0100 by Buffy Meek RN  Activity Management: activity encouraged  Intervention: Prevent Infection  Recent Flowsheet Documentation  Taken 6/10/2023 0300 by Buffy Meek RN  Infection Prevention:   single patient room provided   rest/sleep promoted   hand hygiene promoted  Taken 6/10/2023 0200 by Buffy Meek RN  Infection Prevention:   single patient room provided   rest/sleep  promoted   hand hygiene promoted  Taken 6/10/2023 0100 by Buffy Meek RN  Infection Prevention:   single patient room provided   rest/sleep promoted   hand hygiene promoted  Goal: Optimal Comfort and Wellbeing  Outcome: Ongoing, Progressing  Intervention: Provide Person-Centered Care  Recent Flowsheet Documentation  Taken 6/10/2023 0200 by Buffy Meek RN  Trust Relationship/Rapport:   care explained   choices provided   questions answered   questions encouraged   reassurance provided   thoughts/feelings acknowledged  Goal: Readiness for Transition of Care  Outcome: Ongoing, Progressing     Problem: Fall Injury Risk  Goal: Absence of Fall and Fall-Related Injury  Outcome: Ongoing, Progressing  Intervention: Identify and Manage Contributors  Recent Flowsheet Documentation  Taken 6/10/2023 0300 by Buffy Meek RN  Medication Review/Management: medications reviewed  Taken 6/10/2023 0200 by Buffy Meek RN  Medication Review/Management: medications reviewed  Taken 6/10/2023 0100 by Buffy Meek RN  Medication Review/Management: medications reviewed  Intervention: Promote Injury-Free Environment  Recent Flowsheet Documentation  Taken 6/10/2023 0300 by Buffy Meek RN  Safety Promotion/Fall Prevention:   activity supervised   assistive device/personal items within reach   fall prevention program maintained   safety round/check completed  Taken 6/10/2023 0100 by Buffy Meek RN  Safety Promotion/Fall Prevention:   activity supervised   assistive device/personal items within reach   fall prevention program maintained   safety round/check completed     Problem: Skin Injury Risk Increased  Goal: Skin Health and Integrity  Outcome: Ongoing, Progressing  Intervention: Optimize Skin Protection  Recent Flowsheet Documentation  Taken 6/10/2023 0200 by Buffy Meek RN  Pressure Reduction Techniques:   frequent weight shift encouraged   weight shift assistance provided  Pressure Reduction Devices:    specialty bed utilized   pressure-redistributing mattress utilized  Skin Protection:   adhesive use limited   incontinence pads utilized   transparent dressing maintained   tubing/devices free from skin contact     Problem: Hypertension Comorbidity  Goal: Blood Pressure in Desired Range  Outcome: Ongoing, Progressing  Intervention: Maintain Blood Pressure Management  Recent Flowsheet Documentation  Taken 6/10/2023 0300 by Buffy Meek, RN  Medication Review/Management: medications reviewed  Taken 6/10/2023 0200 by Buffy Meek RN  Medication Review/Management: medications reviewed  Taken 6/10/2023 0100 by Buffy Meek RN  Medication Review/Management: medications reviewed   Goal Outcome Evaluation:              Outcome Evaluation: Pt resting in bed, call light in reach, and bed alarm set. Pt is on room air. No new needs noted at this time.

## 2023-06-10 NOTE — DISCHARGE SUMMARY
[facility] HOSPITALISTS DISCHARGE SUMMARY    Patient Identification:  Name:  Clinton Ramirez  Age:  88 y.o.  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  Hypertension  Hyperlipidemia  Coronary Artery Disease  History Valvular Heart Disease; Mild-Mod Aortic Stenosis  Mild Elevated HS Troponins, suspected due to renal injury  Dementia    CONSULTS   Consults       Date and Time Order Name Status Description    2023  5:57 PM Inpatient Cardiology Consult Completed           PROCEDURES PERFORMED  Procedure(s) (LRB):  Pacemaker DC new (Left)    HOSPITAL COURSE  88M PMH Mild Dementia, History Parotid cancer, Hypertension, Hyperlipidemia, Coronary Artery Disease, SVT, Bradycardia, Mild-Mod Aortic Stenosis, History Rheumatic Fever, 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     #Mild Symptomatic Bradycardia, Mobitz Type II, status post dual chamber pacemaker   #Hypertension/Hyperlipidemia/Coronary Artery Disease  #History Valvular Heart Disease; Mild-Mod Aortic Stenosis  #Mild Elevated HS Troponins, suspected due to renal injury  Patient noted to have have down to 27, mildly symptomatic, Interventional Cardiology consulted and followed, placed pacemaker  without any complications, per Dr. Porter can be discharged home with 3 week follow up for staple removal.  Continue home statin, no home Aspirin 81 though defer adding to Cardiology as outpatient, continue home regimen upon discharge.  Follow up PCP 1 week for blood pressure check and basic labs.     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    #Dementia  Continued home regimen, supportive care    Edited by: Krishna Castro MD at 6/10/2023 0934    VITAL SIGNS:  Temp:  [97.8 °F (36.6 °C)-98.7 °F (37.1 °C)] 98 °F (36.7 °C)  Heart Rate:  [] 84  Resp:  [7-26] 19  BP: (122-184)/() 174/79  SpO2:  [91 %-98 %] 96 %  on  Flow (L/min):  [2] 2;   Device (Oxygen Therapy): room air    Body mass index is 26.08 kg/m².  Wt Readings from Last 3 Encounters:   06/10/23 80.1 kg (176 lb 9.4 oz)   03/30/23 80.6 kg (177 lb 9.6 oz)   02/20/23 81.2 kg (179 lb)     PHYSICAL EXAM:  Constitutional:  Elderly.  No acute distress.      HENT:  Head:  Normocephalic and atraumatic.  Mouth:  Moist mucous membranes.    Eyes:  Conjunctivae and EOM are normal. No scleral icterus.    Neck:  Neck supple.  No JVD present.    Cardiovascular:  regular rate, paced rhythm and normal heart sounds with no murmur.  Pulmonary/Chest:  No respiratory distress, no wheezes, no crackles, on room air  Abdominal:  Soft. No distension and no tenderness.   Musculoskeletal:  No tenderness, and no deformity.  No red or swollen joints anywhere.    Neurological:  Alert and oriented to person, place, and time.  No gross focal deficits    Skin:  Skin is warm and dry. No rash noted. No pallor.   Peripheral vascular:  No clubbing, no cyanosis, no edema.  Psychiatric: Appropriate mood and affect  Edited by: Krishna Castro MD at 6/10/2023 0907    DISCHARGE DISPOSITION   Stable    DISCHARGE MEDICATIONS:     Discharge Medications        Continue These Medications        Instructions Start Date   aspirin 81 MG chewable tablet   81 mg, Oral, Every Morning      atorvastatin 40 MG tablet  Commonly known as: LIPITOR   40 mg, Oral, Daily      donepezil 10 MG tablet  Commonly known as: ARICEPT   10 mg, Oral,  Nightly      felodipine 2.5 MG 24 hr tablet  Commonly known as: PLENDIL   2.5 mg, Oral, 2 Times Daily      loratadine 10 MG tablet  Commonly known as: CLARITIN   10 mg, Oral, Daily      losartan 25 MG tablet  Commonly known as: COZAAR   12.5 mg, Oral, Daily             Stop These Medications      ibuprofen 200 MG tablet  Commonly known as: ADVIL,MOTRIN            Diet Instructions       Diet: Regular/House Diet; Regular Texture (IDDSI 7); Thin (IDDSI 0)      Discharge Diet: Regular/House Diet    Texture: Regular Texture (IDDSI 7)    Fluid Consistency: Thin (IDDSI 0)          Additional Instructions for the Follow-ups that You Need to Schedule       Discharge Follow-up with PCP   As directed       Currently Documented PCP:    Ewelina Chowdary DO    PCP Phone Number:    [phone]     Follow Up Details: 1 week post hospital discharge, needs basic labs         Discharge Follow-up with Specified Provider: Cardiology 3 weeks for staple removal   As directed      To: Cardiology 3 weeks for staple removal                Follow-up Information       Ewelina Chowdary DO .    Specialties: Family Medicine, Hospitalist  Why: 1 week post hospital discharge, needs basic labs  Contact information:  [address] [facility] [address] [phone]               Nolberto Navarro MD .    Specialty: Family Medicine  Why: 1 week post hospital discharge, needs basic labs  Contact information:  [address] [facility] [address] [phone]                            TEST  RESULTS PENDING AT DISCHARGE  None     CODE STATUS  Code Status and Medical Interventions:   Ordered at: 06/08/23 1716     Code Status (Patient has no pulse and is not breathing):    CPR (Attempt to Resuscitate)     Medical Interventions (Patient has pulse or is breathing):    Full Support     Krishna Castro MD  [facility] Hospitalist  06/10/23  09:14 EDT    Please note that this discharge summary required more than 30 minutes to  complete.

## 2023-06-12 ENCOUNTER — TELEPHONE (OUTPATIENT)
Dept: CARDIOLOGY | Facility: CLINIC | Age: 88
End: 2023-06-12

## 2023-06-12 ENCOUNTER — TRANSITIONAL CARE MANAGEMENT TELEPHONE ENCOUNTER (OUTPATIENT)
Dept: CALL CENTER | Facility: HOSPITAL | Age: 88
End: 2023-06-12
Payer: MEDICARE

## 2023-06-12 LAB
QT INTERVAL: 486 MS
QT INTERVAL: 528 MS
QTC INTERVAL: 553 MS
QTC INTERVAL: 557 MS

## 2023-06-12 NOTE — TELEPHONE ENCOUNTER
Caller: AshleyDelaney    Relationship to patient: Emergency Contact    Best call back number: [phone]    Chief complaint: NEEDS STAPLES REMOVED FROM PACE MAKER    Type of visit: F/U    Requested date: APPROX 6/30/23 6/9/23     Additional notes: PT HAD A PACEMAKER PUT IN ON 6/9/23 AT THE [facility] AND WAS TOLD HE NEEDS THE STAPLES REMOVED IN THREE WEEKS. DR. GIBSON IN [place] DID THIS PROCEDURE, BUT HE WILL BE OUT OF THE OFFICE FOR 4-5 WEEKS. THIS PT IS LOOKING TO GET THESE STAPLES REMOVED FROM OUR OFFICE.

## 2023-06-12 NOTE — OUTREACH NOTE
Call Center TCM Note      Flowsheet Row Responses   [facility] patient discharged from? [facility]   Does the patient have one of the following disease processes/diagnoses(primary or secondary)? Other   TCM attempt successful? Yes   Call start time 1524   Call end time 1529   Discharge diagnosis Symptomatic bradycardia, status post dual chamber pacemaker   Person spoke with today (if not patient) and relationship Patient   Meds reviewed with patient/caregiver? Yes  [stop ibuprofen]   Does the patient have all medications ordered at discharge? N/A  [No new meds ordered at discharge.]   Is the patient taking all medications as directed (includes completed medication regime)? Yes   Comments PCP Dr Chowdary. Hospital follow up 6/14/23  8am   Does the patient have an appointment with their PCP within 7 days of discharge? Yes   Has home health visited the patient within 72 hours of discharge? N/A   Psychosocial issues? No   Comments Patient verbalizes awareness of left arm restrictions.   Did the patient receive a copy of their discharge instructions? Yes   Nursing interventions Reviewed instructions with patient   What is the patient's perception of their health status since discharge? Improving   Is the patient/caregiver able to teach back signs and symptoms related to disease process for when to call PCP? Yes   Is the patient/caregiver able to teach back signs and symptoms related to disease process for when to call 911? Yes   Is the patient/caregiver able to teach back the hierarchy of who to call/visit for symptoms/problems? PCP, Specialist, Home health nurse, Urgent Care, ED, 911 Yes   If the patient is a current smoker, are they able to teach back resources for cessation? Not a smoker   TCM call completed? Yes   Call end time 1529   Would this patient benefit from a Referral to Amb Social Work? No   Is the patient interested in additional calls from an ambulatory ?  NOTE:  applies to high risk patients  requiring additional follow-up. No            Gisele Black RN    6/12/2023, 15:30 EDT

## 2023-06-14 ENCOUNTER — TELEPHONE (OUTPATIENT)
Dept: FAMILY MEDICINE CLINIC | Facility: CLINIC | Age: 88
End: 2023-06-14

## 2023-06-14 ENCOUNTER — OFFICE VISIT (OUTPATIENT)
Dept: FAMILY MEDICINE CLINIC | Facility: CLINIC | Age: 88
End: 2023-06-14
Payer: MEDICARE

## 2023-06-14 VITALS
SYSTOLIC BLOOD PRESSURE: 134 MMHG | BODY MASS INDEX: 25.55 KG/M2 | OXYGEN SATURATION: 98 % | TEMPERATURE: 97.8 F | HEART RATE: 76 BPM | DIASTOLIC BLOOD PRESSURE: 58 MMHG | WEIGHT: 173 LBS

## 2023-06-14 DIAGNOSIS — I10 ESSENTIAL HYPERTENSION: Chronic | ICD-10-CM

## 2023-06-14 DIAGNOSIS — N40.1 BENIGN PROSTATIC HYPERPLASIA WITH NOCTURIA: ICD-10-CM

## 2023-06-14 DIAGNOSIS — R00.1 SYMPTOMATIC BRADYCARDIA: Primary | ICD-10-CM

## 2023-06-14 DIAGNOSIS — N18.31 STAGE 3A CHRONIC KIDNEY DISEASE: ICD-10-CM

## 2023-06-14 DIAGNOSIS — R35.1 BENIGN PROSTATIC HYPERPLASIA WITH NOCTURIA: ICD-10-CM

## 2023-06-14 RX ORDER — TAMSULOSIN HYDROCHLORIDE 0.4 MG/1
1 CAPSULE ORAL DAILY
Qty: 30 CAPSULE | Refills: 5 | Status: SHIPPED | OUTPATIENT
Start: 2023-06-14 | End: 2023-06-14 | Stop reason: SDUPTHER

## 2023-06-14 RX ORDER — TAMSULOSIN HYDROCHLORIDE 0.4 MG/1
1 CAPSULE ORAL DAILY
Qty: 30 CAPSULE | Refills: 5 | Status: SHIPPED | OUTPATIENT
Start: 2023-06-14

## 2023-06-14 NOTE — PROGRESS NOTES
Transitional Care Follow Up Visit  Subjective     Clinton Ramirez is a 88 y.o. male who presents for a transitional care management visit.    Within 48 business hours after discharge our office contacted him via telephone to coordinate his care and needs.      I reviewed and discussed the details of that call along with the discharge summary, hospital problems, inpatient lab results, inpatient diagnostic studies, and consultation reports with Clinton.     Current outpatient and discharge medications have been reconciled for the patient.  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  He had BOBBY on CKD 3A noted while in the hospital.  He reports feeling much better since pacemaker placement.  He reports his fatigue has greatly improved.  He denies any syncopal or near syncopal episodes since hospital discharge.  He denies any fever.  He reports shortness of air has dramatically improved for him.  He denies any chest pain.  He reports healing well without any noted redness or drainage from the incisional site.  There were no medicine changes while in the hospital.  He has been monitoring his blood pressure and heart rate closely at home and notices his blood pressure to be well controlled, typically running 120-130/60 to 70 mmHg.  His only complaint today is of prostate issues.  He reports having to get up at night to urinate frequently.  He also notes feeling urinary urgency at times but has difficulty completely emptying his bladder.  He is interested in trying  medicine for this.  He reports the symptoms have been going on a long time but seem to be getting worse.  He denies any dysuria.       The following portions of the patient's history were reviewed and updated as appropriate: allergies, current medications, past family history, past medical history, past social history, past surgical history and problem list.    Review of Systems   Constitutional: Negative for fever.   Respiratory: Negative for shortness of breath.    Cardiovascular: Negative for chest pain.   Genitourinary: Positive for difficulty urinating. Negative for dysuria.   Neurological: Negative for syncope and light-headedness.       Objective   Physical Exam  Vitals reviewed.   Constitutional:       General: He is not in acute distress.  HENT:      Head: Normocephalic and atraumatic.   Eyes:      General: No scleral icterus.     Extraocular Movements: Extraocular movements intact.      Conjunctiva/sclera: Conjunctivae normal.      Pupils: Pupils are equal, round, and reactive to light.   Cardiovascular:      Rate and Rhythm: Normal rate and regular rhythm.   Pulmonary:      Effort: Pulmonary effort is normal. No respiratory distress.      Breath sounds: Normal breath sounds.   Musculoskeletal:         General: No swelling.   Skin:     General: Skin is warm and dry.      Coloration: Skin is not jaundiced.      Comments: Left anterior chest incisional wound is bandaged without any surrounding erythema.  There is no drainage.  There is minimal, yellow ecchymosis present at the left anterior chest.   Neurological:      Mental Status: He is alert.   Psychiatric:         Mood and Affect: Mood normal.         Behavior: Behavior normal.         Assessment & Plan   Problems Addressed this Visit        Cardiac and Vasculature    Essential hypertension (Chronic)    Relevant Orders    CBC (No Diff)    Basic Metabolic Panel    Symptomatic bradycardia - Primary    Relevant Orders    ECG 12 Lead   Other Visit Diagnoses      Stage 3a chronic kidney disease        Relevant Orders    CBC (No Diff)    Basic Metabolic Panel    Benign prostatic hyperplasia with nocturia        Relevant Medications    tamsulosin (FLOMAX) 0.4 MG capsule 24 hr capsule      Diagnoses       Codes Comments    Symptomatic bradycardia    -  Primary ICD-10-CM: R00.1  ICD-9-CM: 427.89     Essential hypertension     ICD-10-CM: I10  ICD-9-CM: 401.9     Stage 3a chronic kidney disease     ICD-10-CM: N18.31  ICD-9-CM: 585.3     Benign prostatic hyperplasia with nocturia     ICD-10-CM: N40.1, R35.1  ICD-9-CM: 600.01, 788.43         ECG in the office today showed paced rhythm at 60 bpm.  This ECG is similar to previous ECG done on 6/10/2023.     I reviewed the records we will check CBC and BMP today.  I recommended continuing current medicine regimen.  We will also add Flomax and I discussed this with patient and his wife today.  Patient is due to have staple removal at the end of June but has been unable to get this scheduled with his cardiologist, because his cardiologist is out of the country for a month.  We will plan to do wound check and staple removal at the end of June when needed.      This document has been electronically signed by Ewelina Chowdary DO  June 14, 2023 09:25 EDT

## 2023-06-29 PROBLEM — R35.1 BENIGN PROSTATIC HYPERPLASIA WITH NOCTURIA: Status: ACTIVE | Noted: 2023-06-29

## 2023-06-29 PROBLEM — N40.1 BENIGN PROSTATIC HYPERPLASIA WITH NOCTURIA: Status: ACTIVE | Noted: 2023-06-29

## 2023-06-29 LAB
ANION GAP SERPL CALCULATED.3IONS-SCNC: 12.1 MMOL/L (ref 5–15)
BUN SERPL-MCNC: 13 MG/DL (ref 8–23)
BUN/CREAT SERPL: 12.1 (ref 7–25)
CALCIUM SPEC-SCNC: 9.4 MG/DL (ref 8.6–10.5)
CHLORIDE SERPL-SCNC: 106 MMOL/L (ref 98–107)
CO2 SERPL-SCNC: 21.9 MMOL/L (ref 22–29)
CREAT SERPL-MCNC: 1.07 MG/DL (ref 0.76–1.27)
DEPRECATED RDW RBC AUTO: 37.3 FL (ref 37–54)
EGFRCR SERPLBLD CKD-EPI 2021: 66.7 ML/MIN/1.73
ERYTHROCYTE [DISTWIDTH] IN BLOOD BY AUTOMATED COUNT: 11.5 % (ref 12.3–15.4)
GLUCOSE SERPL-MCNC: 84 MG/DL (ref 65–99)
HCT VFR BLD AUTO: 40.3 % (ref 37.5–51)
HGB BLD-MCNC: 14.1 G/DL (ref 13–17.7)
MCH RBC QN AUTO: 31.2 PG (ref 26.6–33)
MCHC RBC AUTO-ENTMCNC: 35 G/DL (ref 31.5–35.7)
MCV RBC AUTO: 89.2 FL (ref 79–97)
PLATELET # BLD AUTO: 267 10*3/MM3 (ref 140–450)
PMV BLD AUTO: 12 FL (ref 6–12)
POTASSIUM SERPL-SCNC: 4.2 MMOL/L (ref 3.5–5.2)
RBC # BLD AUTO: 4.52 10*6/MM3 (ref 4.14–5.8)
SODIUM SERPL-SCNC: 140 MMOL/L (ref 136–145)
WBC NRBC COR # BLD: 9.54 10*3/MM3 (ref 3.4–10.8)

## 2023-07-27 ENCOUNTER — TELEPHONE (OUTPATIENT)
Dept: FAMILY MEDICINE CLINIC | Facility: CLINIC | Age: 88
End: 2023-07-27
Payer: MEDICARE

## 2023-07-27 DIAGNOSIS — I10 ESSENTIAL HYPERTENSION: ICD-10-CM

## 2023-08-02 RX ORDER — FELODIPINE 2.5 MG/1
2.5 TABLET, EXTENDED RELEASE ORAL 2 TIMES DAILY
Qty: 30 TABLET | Refills: 0
Start: 2023-08-02

## 2024-01-26 ENCOUNTER — TELEPHONE (OUTPATIENT)
Dept: RADIATION ONCOLOGY | Facility: HOSPITAL | Age: 89
End: 2024-01-26
Payer: MEDICARE

## 2024-01-26 NOTE — TELEPHONE ENCOUNTER
[facility] , DDS JASON GOLDBERG (ORAL/MAXILOFACIAL SURGEON) FROM [place], [place] OFFICE CALLED ASKING TO GET CLEARANCE FOR PT TO HAVE A DENTAL PROCEDURE AND RECORDS. PT WAS TREATED WITH RADIATION AND COMPLETED ALL IN 2021 AND PT WAS SEEN FOR LAST FOLLOW UP WITH RADIATION ONCOLOGY DEPARTMENT 12/2021 AND HAS NOT BEEN SEEN BY THIS OFFICE SINCE. EMEKA NEWSOME IS WANTING RADIATION ONCOLOGY RECORDS AS WELL AS THE CLEARANCE. WILL SEND RECORDS, BUT DUE TO NOT BEING SEEN SINCE 2021 WILL NOT BE ABLE TO GIVE CLEARANCE DUE TO NEEDING CLEARANCE FOR PT TO BE MEDICALLY CLEARED FOR THE PROCEDURE AND TO BE MEDICALLY CLEARED FOR IV SEDATION, AND NOT ABLE TO INFORM OF WHAT MEDICINES FOR PT TO CONTINUE OR DISCONTINUE PRIOR TO PROCEDURE. WILL FAX RECORDS OF WHERE PT HAD RADIATION WHEN TREATED HERE BUT WILL ALSO INFORM CANNOT GIVE MEDICAL CLEARANCE DUE TO NOT SEEN SINCE 2021. WILL INFORM RAD ONC MA AND  AS WELL.

## (undated) DEVICE — KT VLV HEMO MAP ACC PLS LG/BORE MTL/INTRO W/TORQ/DEV

## (undated) DEVICE — GW PRESS VERRATA STR 185CM 10185P

## (undated) DEVICE — ST INF PRI SMRTSTE 20DRP 2VLV 24ML 117

## (undated) DEVICE — SHOULDER IMMOBILIZER: Brand: DEROYAL

## (undated) DEVICE — INTRO SHEATH PRELUDE IDEAL SPRNG COIL 021 6F 23X80CM

## (undated) DEVICE — SPNG GZ WOVN 4X4IN 12PLY 10/BX STRL

## (undated) DEVICE — Device

## (undated) DEVICE — COR PACEMAKER: Brand: MEDLINE INDUSTRIES, INC.

## (undated) DEVICE — DEV COMP RAD PRELUDESYNC 24CM

## (undated) DEVICE — PK CATH CARD 10

## (undated) DEVICE — CATH DIAG EXPO .045 FL3  5F 100CM

## (undated) DEVICE — GW INQWIRE FC PTFE STD J/1.5 .035 260

## (undated) DEVICE — DRSNG SURESITE123 6X8IN

## (undated) DEVICE — GUIDE CATHETER: Brand: MACH1™

## (undated) DEVICE — TBG PENCL TELESCP MEGADYNE SMOKE EVAC 10FT

## (undated) DEVICE — PAD, DEFIB, ADULT, RADIOTRANS, ZOLL: Brand: MEDLINE

## (undated) DEVICE — MODEL BT2000 P/N 700287-012KIT CONTENTS: MANIFOLD WITH SALINE AND CONTRAST PORTS, SALINE TUBING WITH SPIKE AND HAND SYRINGE, TRANSDUCER: Brand: BT2000 AUTOMATED MANIFOLD KIT

## (undated) DEVICE — PROXIMATE PLUS MD MULTI-DIRECTIONAL RELEASE SKIN STAPLERS CONTAINS 35 STAINLESS STEEL STAPLES APPROXIMATE CLOSED DIMENSIONS: 6.9MM X 3.9MM WIDE: Brand: PROXIMATE

## (undated) DEVICE — CATH DIAG EXPO M/ PK 5F FL4/FR4 PIG

## (undated) DEVICE — MODEL AT P65, P/N 701554-001KIT CONTENTS: HAND CONTROLLER, 3-WAY HIGH-PRESSURE STOPCOCK WITH ROTATING END AND PREMIUM HIGH-PRESSURE TUBING: Brand: ANGIOTOUCH® KIT

## (undated) DEVICE — KT MINI ACC MAK COAXL 5F 10CM